# Patient Record
Sex: FEMALE | Race: WHITE | NOT HISPANIC OR LATINO | Employment: OTHER | URBAN - METROPOLITAN AREA
[De-identification: names, ages, dates, MRNs, and addresses within clinical notes are randomized per-mention and may not be internally consistent; named-entity substitution may affect disease eponyms.]

---

## 2019-10-31 DIAGNOSIS — R22.1 LOCALIZED SWELLING, MASS AND LUMP, NECK: Primary | ICD-10-CM

## 2019-11-07 ENCOUNTER — HOSPITAL ENCOUNTER (OUTPATIENT)
Dept: RADIOLOGY | Facility: HOSPITAL | Age: 84
Discharge: HOME | End: 2019-11-07
Attending: OTOLARYNGOLOGY
Payer: MEDICARE

## 2019-11-07 DIAGNOSIS — I25.10 CORONARY ATHEROSCLEROSIS OF NATIVE CORONARY ARTERY: Primary | ICD-10-CM

## 2019-11-07 DIAGNOSIS — E03.9 MYXEDEMA HEART DISEASE: ICD-10-CM

## 2019-11-07 DIAGNOSIS — R22.1 LOCALIZED SWELLING, MASS AND LUMP, NECK: ICD-10-CM

## 2019-11-07 DIAGNOSIS — E78.5 HYPERLIPEMIA: ICD-10-CM

## 2019-11-07 DIAGNOSIS — I51.9 MYXEDEMA HEART DISEASE: ICD-10-CM

## 2019-11-07 DIAGNOSIS — I00 RHEUMATIC FEVER WITHOUT HEART INVOLVEMENT: ICD-10-CM

## 2019-11-07 LAB
CREAT SERPL-MCNC: 0.7 MG/DL (ref 0.5–1.4)
SAMPLE: NORMAL

## 2019-11-07 PROCEDURE — 70492 CT SFT TSUE NCK W/O & W/DYE: CPT | Mod: TC,PO

## 2019-11-07 PROCEDURE — 25500020 PHARM REV CODE 255: Mod: PO | Performed by: OTOLARYNGOLOGY

## 2019-11-07 RX ADMIN — IOHEXOL 100 ML: 350 INJECTION, SOLUTION INTRAVENOUS at 10:11

## 2020-01-10 ENCOUNTER — TELEPHONE (OUTPATIENT)
Dept: INFUSION THERAPY | Facility: HOSPITAL | Age: 85
End: 2020-01-10

## 2020-01-12 PROBLEM — R54 SENILE DEBILITY: Status: ACTIVE | Noted: 2019-03-26

## 2020-01-12 PROBLEM — I25.118 ATHEROSCLEROTIC HEART DISEASE OF NATIVE CORONARY ARTERY WITH OTHER FORMS OF ANGINA PECTORIS: Status: ACTIVE | Noted: 2017-01-06

## 2020-01-12 PROBLEM — G62.9 NEUROPATHY: Status: ACTIVE | Noted: 2018-07-23

## 2020-01-12 PROBLEM — J30.1 SEASONAL ALLERGIC RHINITIS DUE TO POLLEN: Status: ACTIVE | Noted: 2018-04-03

## 2020-01-12 PROBLEM — F41.9 ANXIETY DISORDER, UNSPECIFIED: Status: ACTIVE | Noted: 2017-01-06

## 2020-01-12 PROBLEM — M81.0 OSTEOPOROSIS: Status: ACTIVE | Noted: 2017-01-06

## 2020-01-12 PROBLEM — R26.9 GAIT DISTURBANCE: Status: ACTIVE | Noted: 2018-07-23

## 2020-01-13 ENCOUNTER — OFFICE VISIT (OUTPATIENT)
Dept: FAMILY MEDICINE | Facility: CLINIC | Age: 85
End: 2020-01-13
Payer: MEDICARE

## 2020-01-13 VITALS
WEIGHT: 154 LBS | SYSTOLIC BLOOD PRESSURE: 122 MMHG | BODY MASS INDEX: 27.29 KG/M2 | HEART RATE: 60 BPM | HEIGHT: 63 IN | DIASTOLIC BLOOD PRESSURE: 64 MMHG

## 2020-01-13 DIAGNOSIS — K21.9 GASTROESOPHAGEAL REFLUX DISEASE WITHOUT ESOPHAGITIS: ICD-10-CM

## 2020-01-13 DIAGNOSIS — E78.5 HYPERLIPIDEMIA, UNSPECIFIED HYPERLIPIDEMIA TYPE: ICD-10-CM

## 2020-01-13 DIAGNOSIS — M81.0 AGE-RELATED OSTEOPOROSIS WITHOUT CURRENT PATHOLOGICAL FRACTURE: ICD-10-CM

## 2020-01-13 DIAGNOSIS — G31.84 MILD COGNITIVE IMPAIRMENT: ICD-10-CM

## 2020-01-13 DIAGNOSIS — R54 SENILE DEBILITY: ICD-10-CM

## 2020-01-13 DIAGNOSIS — E03.9 HYPOTHYROIDISM, UNSPECIFIED TYPE: ICD-10-CM

## 2020-01-13 DIAGNOSIS — I10 ESSENTIAL HYPERTENSION: Primary | ICD-10-CM

## 2020-01-13 DIAGNOSIS — F41.9 ANXIETY DISORDER, UNSPECIFIED TYPE: ICD-10-CM

## 2020-01-13 DIAGNOSIS — N95.1 MENOPAUSAL STATE: ICD-10-CM

## 2020-01-13 DIAGNOSIS — I25.118 ATHEROSCLEROTIC HEART DISEASE OF NATIVE CORONARY ARTERY WITH OTHER FORMS OF ANGINA PECTORIS: ICD-10-CM

## 2020-01-13 PROCEDURE — 99214 OFFICE O/P EST MOD 30 MIN: CPT | Mod: S$GLB,,, | Performed by: FAMILY MEDICINE

## 2020-01-13 PROCEDURE — 1159F PR MEDICATION LIST DOCUMENTED IN MEDICAL RECORD: ICD-10-PCS | Mod: S$GLB,,, | Performed by: FAMILY MEDICINE

## 2020-01-13 PROCEDURE — 99214 PR OFFICE/OUTPT VISIT, EST, LEVL IV, 30-39 MIN: ICD-10-PCS | Mod: S$GLB,,, | Performed by: FAMILY MEDICINE

## 2020-01-13 PROCEDURE — 1159F MED LIST DOCD IN RCRD: CPT | Mod: S$GLB,,, | Performed by: FAMILY MEDICINE

## 2020-01-13 RX ORDER — FAMOTIDINE 20 MG/1
20 TABLET, FILM COATED ORAL NIGHTLY PRN
Qty: 90 TABLET | Refills: 1 | Status: SHIPPED | OUTPATIENT
Start: 2020-01-13 | End: 2021-01-05 | Stop reason: SDUPTHER

## 2020-01-13 RX ORDER — PANTOPRAZOLE SODIUM 40 MG/1
40 TABLET, DELAYED RELEASE ORAL DAILY
Qty: 90 TABLET | Refills: 1 | Status: SHIPPED | OUTPATIENT
Start: 2020-01-13 | End: 2020-03-16 | Stop reason: SDUPTHER

## 2020-01-13 RX ORDER — CLOPIDOGREL BISULFATE 75 MG/1
75 TABLET ORAL DAILY
COMMUNITY
End: 2020-01-13 | Stop reason: SDUPTHER

## 2020-01-13 RX ORDER — PANTOPRAZOLE SODIUM 40 MG/1
40 TABLET, DELAYED RELEASE ORAL DAILY
COMMUNITY
End: 2020-01-13 | Stop reason: SDUPTHER

## 2020-01-13 RX ORDER — FLUOXETINE 10 MG/1
10 CAPSULE ORAL DAILY
Qty: 90 CAPSULE | Refills: 1 | Status: SHIPPED | OUTPATIENT
Start: 2020-01-13 | End: 2020-03-16 | Stop reason: SDUPTHER

## 2020-01-13 RX ORDER — AMITRIPTYLINE HYDROCHLORIDE 10 MG/1
20 TABLET, FILM COATED ORAL NIGHTLY PRN
Qty: 180 TABLET | Refills: 1 | Status: SHIPPED | OUTPATIENT
Start: 2020-01-13 | End: 2020-03-16 | Stop reason: SDUPTHER

## 2020-01-13 RX ORDER — CARVEDILOL 3.12 MG/1
3.12 TABLET ORAL 2 TIMES DAILY
Qty: 180 TABLET | Refills: 1 | Status: SHIPPED | OUTPATIENT
Start: 2020-01-13 | End: 2020-03-16 | Stop reason: SDUPTHER

## 2020-01-13 RX ORDER — FLUOXETINE 10 MG/1
10 CAPSULE ORAL DAILY
COMMUNITY
End: 2020-01-13 | Stop reason: SDUPTHER

## 2020-01-13 RX ORDER — ATORVASTATIN CALCIUM 40 MG/1
40 TABLET, FILM COATED ORAL DAILY
COMMUNITY
End: 2020-01-13 | Stop reason: SDUPTHER

## 2020-01-13 RX ORDER — CLOPIDOGREL BISULFATE 75 MG/1
75 TABLET ORAL DAILY
Qty: 90 TABLET | Refills: 1 | Status: SHIPPED | OUTPATIENT
Start: 2020-01-13 | End: 2020-03-16

## 2020-01-13 RX ORDER — LEVOTHYROXINE SODIUM 75 UG/1
75 TABLET ORAL
Qty: 90 TABLET | Refills: 1 | Status: SHIPPED | OUTPATIENT
Start: 2020-01-13 | End: 2020-03-16 | Stop reason: SDUPTHER

## 2020-01-13 RX ORDER — CARVEDILOL 3.12 MG/1
3.12 TABLET ORAL 2 TIMES DAILY
COMMUNITY
Start: 2019-11-27 | End: 2020-01-13 | Stop reason: SDUPTHER

## 2020-01-13 RX ORDER — AMITRIPTYLINE HYDROCHLORIDE 10 MG/1
20 TABLET, FILM COATED ORAL NIGHTLY PRN
COMMUNITY
End: 2020-01-13 | Stop reason: SDUPTHER

## 2020-01-13 RX ORDER — LEVOTHYROXINE SODIUM 75 UG/1
75 TABLET ORAL
COMMUNITY
Start: 2019-11-15 | End: 2020-01-13 | Stop reason: SDUPTHER

## 2020-01-13 RX ORDER — ASPIRIN 81 MG/1
81 TABLET ORAL DAILY
COMMUNITY
End: 2020-03-16

## 2020-01-13 RX ORDER — ATORVASTATIN CALCIUM 40 MG/1
40 TABLET, FILM COATED ORAL DAILY
Qty: 90 TABLET | Refills: 1 | Status: SHIPPED | OUTPATIENT
Start: 2020-01-13 | End: 2020-03-16 | Stop reason: SDUPTHER

## 2020-01-13 NOTE — PROGRESS NOTES
SUBJECTIVE:    Patient ID: Mere Hoffmann is a 86 y.o. female.    Chief Complaint: Follow-up (bottles brought -ac )    This 86-year-old female lives with her daughter and her family.  Recently she has had some basal cell carcinoma as on the face and had Mohs surgery.  Dr. Candice Kaiser  Dermatology sees her q.6 months.    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  She states she has been getting more and more forgetful and needs reminders and cues to do her daily chores.  She needs help with taking showers and needs supervision for her showers.  She needs assistance with her grocery with her dressing and with putting on her shoes.  She is not able to cook a meal.  And her balance requires her to use a Rollator to prevent falls.      Hospital Outpatient Visit on 11/07/2019   Component Date Value Ref Range Status    POC Creatinine 11/07/2019 0.7  0.5 - 1.4 mg/dL Final    Sample 11/07/2019 CHRIS   Final   Lab Visit on 11/07/2019   Component Date Value Ref Range Status    Sodium 11/07/2019 142  136 - 145 mmol/L Final    Potassium 11/07/2019 4.0  3.5 - 5.1 mmol/L Final    Chloride 11/07/2019 105  95 - 110 mmol/L Final    CO2 11/07/2019 27  23 - 29 mmol/L Final    Glucose 11/07/2019 96  70 - 110 mg/dL Final    BUN, Bld 11/07/2019 13  8 - 23 mg/dL Final    Creatinine 11/07/2019 0.8  0.5 - 1.4 mg/dL Final    Calcium 11/07/2019 8.3* 8.7 - 10.5 mg/dL Final    Total Protein 11/07/2019 7.0  6.0 - 8.4 g/dL Final    Albumin 11/07/2019 3.9  3.5 - 5.2 g/dL Final    Total Bilirubin 11/07/2019 0.9  0.1 - 1.0 mg/dL Final    Alkaline Phosphatase 11/07/2019 106  55 - 135 U/L Final    AST 11/07/2019 18  10 - 40 U/L Final    ALT 11/07/2019 22  10 - 44 U/L Final    Anion Gap 11/07/2019 10  8 - 16 mmol/L Final    eGFR if African American 11/07/2019 >60.0  >60 mL/min/1.73 m^2 Final    eGFR if non African American  11/07/2019 >60.0  >60 mL/min/1.73 m^2 Final    Cholesterol 11/07/2019 154  120 - 199 mg/dL Final    Triglycerides 11/07/2019 129  30 - 150 mg/dL Final    HDL 11/07/2019 43  40 - 75 mg/dL Final    LDL Cholesterol 11/07/2019 85.2  63.0 - 159.0 mg/dL Final    Hdl/Cholesterol Ratio 11/07/2019 27.9  20.0 - 50.0 % Final    Total Cholesterol/HDL Ratio 11/07/2019 3.6  2.0 - 5.0 Final    Non-HDL Cholesterol 11/07/2019 111  mg/dL Final    WBC 11/07/2019 6.31  3.90 - 12.70 K/uL Final    RBC 11/07/2019 4.41  4.00 - 5.40 M/uL Final    Hemoglobin 11/07/2019 12.9  12.0 - 16.0 g/dL Final    Hematocrit 11/07/2019 39.4  37.0 - 48.5 % Final    Mean Corpuscular Volume 11/07/2019 89  82 - 98 fL Final    Mean Corpuscular Hemoglobin 11/07/2019 29.3  27.0 - 31.0 pg Final    Mean Corpuscular Hemoglobin Conc 11/07/2019 32.7  32.0 - 36.0 g/dL Final    RDW 11/07/2019 13.3  11.5 - 14.5 % Final    Platelets 11/07/2019 232  150 - 350 K/uL Final    MPV 11/07/2019 9.7  9.2 - 12.9 fL Final    Immature Granulocytes 11/07/2019 0.5  0.0 - 0.5 % Final    Gran # (ANC) 11/07/2019 4.0  1.8 - 7.7 K/uL Final    Immature Grans (Abs) 11/07/2019 0.03  0.00 - 0.04 K/uL Final    Lymph # 11/07/2019 1.6  1.0 - 4.8 K/uL Final    Mono # 11/07/2019 0.4  0.3 - 1.0 K/uL Final    Eos # 11/07/2019 0.3  0.0 - 0.5 K/uL Final    Baso # 11/07/2019 0.07  0.00 - 0.20 K/uL Final    nRBC 11/07/2019 0  0 /100 WBC Final    Gran% 11/07/2019 63.0  38.0 - 73.0 % Final    Lymph% 11/07/2019 24.6  18.0 - 48.0 % Final    Mono% 11/07/2019 6.7  4.0 - 15.0 % Final    Eosinophil% 11/07/2019 4.1  0.0 - 8.0 % Final    Basophil% 11/07/2019 1.1  0.0 - 1.9 % Final    Differential Method 11/07/2019 Automated   Final    BNP 11/07/2019 44  0 - 99 pg/mL Final       History reviewed. No pertinent past medical history.  Past Surgical History:   Procedure Laterality Date    CHOLECYSTECTOMY      moh's surgery  2019    basal cell ca     History reviewed. No pertinent  family history.    Marital Status:   Alcohol History:  has no alcohol history on file.  Tobacco History:  reports that she has quit smoking. She has never used smokeless tobacco.  Drug History:  has no drug history on file.    Review of patient's allergies indicates:   Allergen Reactions    Penicillins     Sulfa (sulfonamide antibiotics)        Current Outpatient Medications:     amitriptyline (ELAVIL) 10 MG tablet, Take 2 tablets (20 mg total) by mouth nightly as needed for Insomnia., Disp: 180 tablet, Rfl: 1    aspirin (ECOTRIN) 81 MG EC tablet, Take 81 mg by mouth once daily., Disp: , Rfl:     atorvastatin (LIPITOR) 40 MG tablet, Take 1 tablet (40 mg total) by mouth once daily., Disp: 90 tablet, Rfl: 1    carvedilol (COREG) 3.125 MG tablet, Take 1 tablet (3.125 mg total) by mouth 2 (two) times daily., Disp: 180 tablet, Rfl: 1    clopidogrel (PLAVIX) 75 mg tablet, Take 1 tablet (75 mg total) by mouth once daily., Disp: 90 tablet, Rfl: 1    FLUoxetine 10 MG capsule, Take 1 capsule (10 mg total) by mouth once daily., Disp: 90 capsule, Rfl: 1    pantoprazole (PROTONIX) 40 MG tablet, Take 1 tablet (40 mg total) by mouth once daily., Disp: 90 tablet, Rfl: 1    UNITHROID 75 mcg tablet, Take 1 tablet (75 mcg total) by mouth before breakfast., Disp: 90 tablet, Rfl: 1    famotidine (PEPCID) 20 MG tablet, Take 1 tablet (20 mg total) by mouth nightly as needed for Heartburn., Disp: 90 tablet, Rfl: 1    Review of Systems   Constitutional: Negative for appetite change (eats  2  meals  a  day , plus  snacks , loves  chocolate), chills, fatigue, fever and unexpected weight change.   HENT: Negative for congestion, ear pain, sinus pain, sore throat and trouble swallowing.    Eyes: Negative for pain, discharge and visual disturbance.   Respiratory: Negative for apnea, cough, shortness of breath (with exertion) and wheezing.    Cardiovascular: Negative for chest pain, palpitations and leg swelling.  "  Gastrointestinal: Positive for constipation (Miralax helps). Negative for abdominal pain, blood in stool, diarrhea, nausea and vomiting.        On protonix and  Zantac    Endocrine: Negative for heat intolerance, polydipsia and polyuria.   Genitourinary: Negative for difficulty urinating, dyspareunia, dysuria, frequency, hematuria and menstrual problem.        No nocturia, urge incontinence   Musculoskeletal: Positive for back pain (mid back pains .. occas  Tylebnol). Negative for arthralgias, gait problem, joint swelling and myalgias.   Skin: Positive for rash (basal cell ca).   Allergic/Immunologic: Negative for environmental allergies, food allergies and immunocompromised state.   Neurological: Negative for dizziness, tremors, seizures, numbness and headaches.   Psychiatric/Behavioral: Negative for behavioral problems, confusion, hallucinations, sleep disturbance (sleeps  well w/ amitriptylline) and suicidal ideas. The patient is not nervous/anxious.           Objective:      Vitals:    01/13/20 1140   BP: 122/64   Pulse: 60   Weight: 69.9 kg (154 lb)   Height: 5' 2.5" (1.588 m)     Body mass index is 27.72 kg/m².  Physical Exam   Constitutional: She is oriented to person, place, and time. She appears well-developed and well-nourished.   Elderly female in no apparent distress.   HENT:   Head: Normocephalic and atraumatic.   Right Ear: External ear normal.   Left Ear: External ear normal.   Nose: Nose normal.   Mouth/Throat: Oropharynx is clear and moist.   Eyes: Pupils are equal, round, and reactive to light. EOM are normal.   Neck: Normal range of motion. Neck supple. Carotid bruit is not present. No thyromegaly present.   Cardiovascular: Normal rate, regular rhythm, normal heart sounds and intact distal pulses.   No murmur heard.  Pulmonary/Chest: Effort normal and breath sounds normal. She has no wheezes. She has no rales.   Abdominal: Soft. Bowel sounds are normal. She exhibits no distension. There is no " hepatosplenomegaly. There is no tenderness.   Musculoskeletal: Normal range of motion. She exhibits no tenderness or deformity.        Lumbar back: Normal. She exhibits no pain and no spasm.   Bends 90 degrees at  waist shoulders have full range of motion knees are crepitant but have good range of motion.  She walks with a slow off balance cautious  gait   Lymphadenopathy:     She has no cervical adenopathy.   Neurological: She is alert and oriented to person, place, and time. No cranial nerve deficit. Coordination normal.   She remembers 3 of the last 4 presidents.   Skin: Skin is warm and dry. No rash noted.   Psychiatric: She has a normal mood and affect. Her behavior is normal. Judgment and thought content normal.   Nursing note and vitals reviewed.        Assessment:       1. Essential hypertension    2. Anxiety disorder, unspecified type    3. 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 Hyperlipidemia, unspecified hyperlipidemia type    5. Hypothyroidism, unspecified type    6. Gastroesophageal reflux disease without esophagitis    7. Mild cognitive impairment    8. Menopausal state    9. Age-related osteoporosis without current pathological fracture    10. Senile debility         Plan:       Essential hypertension  Blood pressure well controlled on current medications.  Anxiety disorder, unspecified type  -     FLUoxetine 10 MG capsule; Take 1 capsule (10 mg total) by mouth once daily.  Dispense: 90 capsule; Refill: 1  -     amitriptyline (ELAVIL) 10 MG tablet; Take 2 tablets (20 mg total) by mouth nightly as needed for Insomnia.  Dispense: 180 tablet; Refill: 1  Okay to refill Prozac and Elavil  Atherosclerotic heart disease of native coronary artery with other forms of angina pectoris  -     clopidogrel (PLAVIX) 75 mg tablet; Take 1 tablet (75 mg total) by mouth once daily.  Dispense: 90 tablet; Refill: 1  -     carvedilol (COREG) 3.125 MG tablet; Take 1  tablet (3.125 mg total) by mouth 2 (two) times daily.  Dispense: 180 tablet; Refill: 1  Continue current medications, she has no current angina  Hyperlipidemia, unspecified hyperlipidemia type  -     atorvastatin (LIPITOR) 40 MG tablet; Take 1 tablet (40 mg total) by mouth once daily.  Dispense: 90 tablet; Refill: 1    Hypothyroidism, unspecified type  -     UNITHROID 75 mcg tablet; Take 1 tablet (75 mcg total) by mouth before breakfast.  Dispense: 90 tablet; Refill: 1  Continue unithroid,.  Check TSH in 6 months  Gastroesophageal reflux disease without esophagitis  -     pantoprazole (PROTONIX) 40 MG tablet; Take 1 tablet (40 mg total) by mouth once daily.  Dispense: 90 tablet; Refill: 1  -     famotidine (PEPCID) 20 MG tablet; Take 1 tablet (20 mg total) by mouth nightly as needed for Heartburn.  Dispense: 90 tablet; Refill: 1  Continue anti-reflux medications  Mild cognitive impairment  Normal aging process with mild forgetfulness present.  Menopausal state    Age-related osteoporosis without current pathological fracture    Senile debility      No follow-ups on file.

## 2020-01-29 ENCOUNTER — LAB VISIT (OUTPATIENT)
Dept: LAB | Facility: HOSPITAL | Age: 85
End: 2020-01-29
Attending: INTERNAL MEDICINE
Payer: MEDICARE

## 2020-01-29 DIAGNOSIS — Z79.899 ENCOUNTER FOR LONG-TERM (CURRENT) USE OF OTHER MEDICATIONS: ICD-10-CM

## 2020-01-29 DIAGNOSIS — M81.0 SENILE OSTEOPOROSIS: Primary | ICD-10-CM

## 2020-01-29 LAB — CALCIUM SERPL-MCNC: 9.1 MG/DL (ref 8.7–10.5)

## 2020-01-29 PROCEDURE — 82310 ASSAY OF CALCIUM: CPT

## 2020-01-29 PROCEDURE — 36415 COLL VENOUS BLD VENIPUNCTURE: CPT

## 2020-02-05 ENCOUNTER — INFUSION (OUTPATIENT)
Dept: INFUSION THERAPY | Facility: HOSPITAL | Age: 85
End: 2020-02-05
Attending: INTERNAL MEDICINE
Payer: MEDICARE

## 2020-02-05 VITALS
TEMPERATURE: 97 F | BODY MASS INDEX: 28.11 KG/M2 | SYSTOLIC BLOOD PRESSURE: 135 MMHG | DIASTOLIC BLOOD PRESSURE: 64 MMHG | RESPIRATION RATE: 18 BRPM | WEIGHT: 156.19 LBS | HEART RATE: 81 BPM

## 2020-02-05 DIAGNOSIS — M81.0 AGE-RELATED OSTEOPOROSIS WITHOUT CURRENT PATHOLOGICAL FRACTURE: Primary | ICD-10-CM

## 2020-02-05 PROCEDURE — 96372 THER/PROPH/DIAG INJ SC/IM: CPT

## 2020-02-05 PROCEDURE — 63600175 PHARM REV CODE 636 W HCPCS: Mod: JG | Performed by: INTERNAL MEDICINE

## 2020-02-05 RX ADMIN — DENOSUMAB 60 MG: 60 INJECTION SUBCUTANEOUS at 03:02

## 2020-02-11 ENCOUNTER — LAB VISIT (OUTPATIENT)
Dept: LAB | Facility: HOSPITAL | Age: 85
End: 2020-02-11
Attending: INTERNAL MEDICINE
Payer: MEDICARE

## 2020-02-11 DIAGNOSIS — I25.10 CORONARY ATHEROSCLEROSIS OF NATIVE CORONARY ARTERY: Primary | ICD-10-CM

## 2020-02-11 DIAGNOSIS — Z79.899 ENCOUNTER FOR LONG-TERM (CURRENT) USE OF OTHER MEDICATIONS: ICD-10-CM

## 2020-02-11 DIAGNOSIS — I10 ESSENTIAL HYPERTENSION, MALIGNANT: ICD-10-CM

## 2020-02-11 DIAGNOSIS — E78.41 ELEVATED LIPOPROTEIN A LEVEL: ICD-10-CM

## 2020-02-11 LAB
ALBUMIN SERPL BCP-MCNC: 3.7 G/DL (ref 3.5–5.2)
ALP SERPL-CCNC: 101 U/L (ref 55–135)
ALT SERPL W/O P-5'-P-CCNC: 27 U/L (ref 10–44)
ANION GAP SERPL CALC-SCNC: 9 MMOL/L (ref 8–16)
AST SERPL-CCNC: 22 U/L (ref 10–40)
BACTERIA #/AREA URNS HPF: NEGATIVE /HPF
BASOPHILS # BLD AUTO: 0.06 K/UL (ref 0–0.2)
BASOPHILS NFR BLD: 1 % (ref 0–1.9)
BILIRUB SERPL-MCNC: 0.8 MG/DL (ref 0.1–1)
BILIRUB UR QL STRIP: NEGATIVE
BUN SERPL-MCNC: 14 MG/DL (ref 8–23)
CALCIUM SERPL-MCNC: 8 MG/DL (ref 8.7–10.5)
CHLORIDE SERPL-SCNC: 105 MMOL/L (ref 95–110)
CLARITY UR: CLEAR
CO2 SERPL-SCNC: 25 MMOL/L (ref 23–29)
COLOR UR: YELLOW
CREAT SERPL-MCNC: 0.8 MG/DL (ref 0.5–1.4)
DIFFERENTIAL METHOD: NORMAL
EOSINOPHIL # BLD AUTO: 0.2 K/UL (ref 0–0.5)
EOSINOPHIL NFR BLD: 3.7 % (ref 0–8)
ERYTHROCYTE [DISTWIDTH] IN BLOOD BY AUTOMATED COUNT: 13.2 % (ref 11.5–14.5)
EST. GFR  (AFRICAN AMERICAN): >60 ML/MIN/1.73 M^2
EST. GFR  (NON AFRICAN AMERICAN): >60 ML/MIN/1.73 M^2
GLUCOSE SERPL-MCNC: 112 MG/DL (ref 70–110)
GLUCOSE UR QL STRIP: NEGATIVE
HCT VFR BLD AUTO: 40.7 % (ref 37–48.5)
HGB BLD-MCNC: 13.2 G/DL (ref 12–16)
HGB UR QL STRIP: NEGATIVE
HYALINE CASTS #/AREA URNS LPF: 27 /LPF
IMM GRANULOCYTES # BLD AUTO: 0.02 K/UL (ref 0–0.04)
IMM GRANULOCYTES NFR BLD AUTO: 0.3 % (ref 0–0.5)
KETONES UR QL STRIP: NEGATIVE
LEUKOCYTE ESTERASE UR QL STRIP: ABNORMAL
LYMPHOCYTES # BLD AUTO: 1.6 K/UL (ref 1–4.8)
LYMPHOCYTES NFR BLD: 28.7 % (ref 18–48)
MAGNESIUM SERPL-MCNC: 2.4 MG/DL (ref 1.6–2.6)
MCH RBC QN AUTO: 28.8 PG (ref 27–31)
MCHC RBC AUTO-ENTMCNC: 32.4 G/DL (ref 32–36)
MCV RBC AUTO: 89 FL (ref 82–98)
MICROSCOPIC COMMENT: ABNORMAL
MONOCYTES # BLD AUTO: 0.4 K/UL (ref 0.3–1)
MONOCYTES NFR BLD: 7.5 % (ref 4–15)
NEUTROPHILS # BLD AUTO: 3.4 K/UL (ref 1.8–7.7)
NEUTROPHILS NFR BLD: 58.8 % (ref 38–73)
NITRITE UR QL STRIP: NEGATIVE
NRBC BLD-RTO: 0 /100 WBC
PH UR STRIP: 7 [PH] (ref 5–8)
PLATELET # BLD AUTO: 240 K/UL (ref 150–350)
PMV BLD AUTO: 9.6 FL (ref 9.2–12.9)
POTASSIUM SERPL-SCNC: 4.1 MMOL/L (ref 3.5–5.1)
PROT SERPL-MCNC: 6.8 G/DL (ref 6–8.4)
PROT UR QL STRIP: NEGATIVE
RBC # BLD AUTO: 4.59 M/UL (ref 4–5.4)
RBC #/AREA URNS HPF: 1 /HPF (ref 0–4)
SODIUM SERPL-SCNC: 139 MMOL/L (ref 136–145)
SP GR UR STRIP: 1.02 (ref 1–1.03)
SQUAMOUS #/AREA URNS HPF: 6 /HPF
URN SPEC COLLECT METH UR: ABNORMAL
UROBILINOGEN UR STRIP-ACNC: NEGATIVE EU/DL
WBC # BLD AUTO: 5.72 K/UL (ref 3.9–12.7)
WBC #/AREA URNS HPF: 4 /HPF (ref 0–5)
WBC CLUMPS URNS QL MICRO: ABNORMAL

## 2020-02-11 PROCEDURE — 80053 COMPREHEN METABOLIC PANEL: CPT

## 2020-02-11 PROCEDURE — 36415 COLL VENOUS BLD VENIPUNCTURE: CPT

## 2020-02-11 PROCEDURE — 83735 ASSAY OF MAGNESIUM: CPT

## 2020-02-11 PROCEDURE — 81001 URINALYSIS AUTO W/SCOPE: CPT

## 2020-02-11 PROCEDURE — 85025 COMPLETE CBC W/AUTO DIFF WBC: CPT

## 2020-02-21 ENCOUNTER — HOSPITAL ENCOUNTER (OUTPATIENT)
Dept: RADIOLOGY | Facility: HOSPITAL | Age: 85
Discharge: HOME | End: 2020-02-21
Attending: PSYCHIATRY & NEUROLOGY
Payer: MEDICARE

## 2020-02-21 DIAGNOSIS — R41.0 DISORIENTATED: ICD-10-CM

## 2020-02-21 DIAGNOSIS — R41.0 DISORIENTATED: Primary | ICD-10-CM

## 2020-02-21 PROCEDURE — 71046 X-RAY EXAM CHEST 2 VIEWS: CPT | Mod: TC,PO

## 2020-02-26 DIAGNOSIS — I63.9 CEREBRAL INFARCTION: Primary | ICD-10-CM

## 2020-02-28 DIAGNOSIS — I63.9 CEREBRAL INFARCTION, UNSPECIFIED: Primary | ICD-10-CM

## 2020-03-05 ENCOUNTER — TELEPHONE (OUTPATIENT)
Dept: FAMILY MEDICINE | Facility: CLINIC | Age: 85
End: 2020-03-05

## 2020-03-05 NOTE — TELEPHONE ENCOUNTER
----- Message from Katie Diaz sent at 3/5/2020 11:46 AM CST -----  Patients daughter tyrone wants to know when the soonest her mother could see dr bertrand please give her a call [phone] patient was diagnosed as having a stroke

## 2020-03-06 DIAGNOSIS — I65.29 OCCLUSION AND STENOSIS OF UNSPECIFIED CAROTID ARTERY: Primary | ICD-10-CM

## 2020-03-10 ENCOUNTER — HOSPITAL ENCOUNTER (OUTPATIENT)
Dept: RADIOLOGY | Facility: HOSPITAL | Age: 85
Discharge: HOME | End: 2020-03-10
Attending: INTERNAL MEDICINE
Payer: MEDICARE

## 2020-03-10 DIAGNOSIS — M25.559 PAIN IN JOINT, PELVIC REGION AND THIGH: ICD-10-CM

## 2020-03-10 DIAGNOSIS — M25.559 PAIN IN JOINT, PELVIC REGION AND THIGH: Primary | ICD-10-CM

## 2020-03-10 DIAGNOSIS — E78.5 HYPERLIPEMIA: Primary | ICD-10-CM

## 2020-03-10 PROCEDURE — 72110 X-RAY EXAM L-2 SPINE 4/>VWS: CPT | Mod: TC,PO

## 2020-03-10 PROCEDURE — 73502 X-RAY EXAM HIP UNI 2-3 VIEWS: CPT | Mod: TC,PO,LT

## 2020-03-16 ENCOUNTER — TELEPHONE (OUTPATIENT)
Dept: FAMILY MEDICINE | Facility: CLINIC | Age: 85
End: 2020-03-16

## 2020-03-16 ENCOUNTER — OFFICE VISIT (OUTPATIENT)
Dept: FAMILY MEDICINE | Facility: CLINIC | Age: 85
End: 2020-03-16
Payer: MEDICARE

## 2020-03-16 VITALS
HEART RATE: 64 BPM | DIASTOLIC BLOOD PRESSURE: 84 MMHG | HEIGHT: 63 IN | BODY MASS INDEX: 26.05 KG/M2 | WEIGHT: 147 LBS | SYSTOLIC BLOOD PRESSURE: 136 MMHG

## 2020-03-16 DIAGNOSIS — N95.1 MENOPAUSAL STATE: ICD-10-CM

## 2020-03-16 DIAGNOSIS — I63.81 STROKE OF RIGHT BASAL GANGLIA: Primary | ICD-10-CM

## 2020-03-16 DIAGNOSIS — F41.9 ANXIETY DISORDER, UNSPECIFIED TYPE: ICD-10-CM

## 2020-03-16 DIAGNOSIS — R11.0 NAUSEA: ICD-10-CM

## 2020-03-16 DIAGNOSIS — G62.9 NEUROPATHY: ICD-10-CM

## 2020-03-16 DIAGNOSIS — G31.84 MILD COGNITIVE IMPAIRMENT: ICD-10-CM

## 2020-03-16 DIAGNOSIS — M81.0 AGE-RELATED OSTEOPOROSIS WITHOUT CURRENT PATHOLOGICAL FRACTURE: ICD-10-CM

## 2020-03-16 DIAGNOSIS — I25.118 ATHEROSCLEROTIC HEART DISEASE OF NATIVE CORONARY ARTERY WITH OTHER FORMS OF ANGINA PECTORIS: ICD-10-CM

## 2020-03-16 DIAGNOSIS — I10 ESSENTIAL HYPERTENSION: ICD-10-CM

## 2020-03-16 DIAGNOSIS — E78.5 HYPERLIPIDEMIA, UNSPECIFIED HYPERLIPIDEMIA TYPE: ICD-10-CM

## 2020-03-16 DIAGNOSIS — E03.9 HYPOTHYROIDISM, UNSPECIFIED TYPE: ICD-10-CM

## 2020-03-16 DIAGNOSIS — K21.9 GASTROESOPHAGEAL REFLUX DISEASE WITHOUT ESOPHAGITIS: ICD-10-CM

## 2020-03-16 PROCEDURE — 99214 PR OFFICE/OUTPT VISIT, EST, LEVL IV, 30-39 MIN: ICD-10-PCS | Mod: S$GLB,,, | Performed by: FAMILY MEDICINE

## 2020-03-16 PROCEDURE — 99214 OFFICE O/P EST MOD 30 MIN: CPT | Mod: S$GLB,,, | Performed by: FAMILY MEDICINE

## 2020-03-16 RX ORDER — LEVOTHYROXINE SODIUM 75 UG/1
75 TABLET ORAL
Qty: 90 TABLET | Refills: 1 | Status: SHIPPED | OUTPATIENT
Start: 2020-03-16 | End: 2021-01-18 | Stop reason: SDUPTHER

## 2020-03-16 RX ORDER — PANTOPRAZOLE SODIUM 40 MG/1
40 TABLET, DELAYED RELEASE ORAL DAILY
Qty: 90 TABLET | Refills: 1 | Status: SHIPPED | OUTPATIENT
Start: 2020-03-16 | End: 2021-01-18 | Stop reason: SDUPTHER

## 2020-03-16 RX ORDER — AMITRIPTYLINE HYDROCHLORIDE 10 MG/1
20 TABLET, FILM COATED ORAL NIGHTLY PRN
Qty: 180 TABLET | Refills: 1 | Status: SHIPPED | OUTPATIENT
Start: 2020-03-16 | End: 2020-09-28 | Stop reason: SDUPTHER

## 2020-03-16 RX ORDER — GABAPENTIN 100 MG/1
CAPSULE ORAL
Qty: 60 CAPSULE | Refills: 2 | Status: SHIPPED | OUTPATIENT
Start: 2020-03-16 | End: 2021-01-18

## 2020-03-16 RX ORDER — FLUOXETINE 10 MG/1
10 CAPSULE ORAL DAILY
Qty: 90 CAPSULE | Refills: 1 | Status: SHIPPED | OUTPATIENT
Start: 2020-03-16 | End: 2021-01-18 | Stop reason: SDUPTHER

## 2020-03-16 RX ORDER — ATORVASTATIN CALCIUM 40 MG/1
40 TABLET, FILM COATED ORAL DAILY
Qty: 90 TABLET | Refills: 1 | Status: SHIPPED | OUTPATIENT
Start: 2020-03-16 | End: 2021-01-18 | Stop reason: SDUPTHER

## 2020-03-16 RX ORDER — ONDANSETRON 4 MG/1
8 TABLET, FILM COATED ORAL EVERY 12 HOURS PRN
Qty: 60 TABLET | Refills: 0 | Status: SHIPPED | OUTPATIENT
Start: 2020-03-16 | End: 2020-04-07 | Stop reason: SDUPTHER

## 2020-03-16 RX ORDER — CARVEDILOL 3.12 MG/1
3.12 TABLET ORAL 2 TIMES DAILY
Qty: 180 TABLET | Refills: 1 | Status: SHIPPED | OUTPATIENT
Start: 2020-03-16 | End: 2021-01-18 | Stop reason: SDUPTHER

## 2020-03-16 RX ORDER — ASPIRIN AND DIPYRIDAMOLE 25; 200 MG/1; MG/1
CAPSULE, EXTENDED RELEASE ORAL
COMMUNITY
Start: 2020-03-10 | End: 2020-09-28 | Stop reason: SDUPTHER

## 2020-03-16 NOTE — PROGRESS NOTES
SUBJECTIVE:    Patient ID: Mere Hoffmann is a 87 y.o. female.    Chief Complaint: Follow-up (did not bring bottles. refills set up - )    In February this 87-year-old female woke up feeling rather dazed.  She was weak head decrease cognitive functioning.  Her daughter could not get her in to her cardiologist so she saw Dr. Pathak neurology.  MRI was ordered and did reveal an acute right-sided basal ganglial infarct.  She was then able to see Dr. Myers cardiology who felt this was an aspirin and Plavix failure for a CVA.  He switched her to Aggrenox 1 p.o. b.i.d..  However this is cause some nausea requiring Zofran 8 mg prior to her Aggrenox b.i.d. to keep the medicines down.  She has some loose stools upset stomach.  Has generalized weakness some left groin and left calf pain compatible with some with sciatica.  She walks only with a Rollator and has very poor balance      Lab Visit on 03/10/2020   Component Date Value Ref Range Status    Cholesterol 03/10/2020 147  120 - 199 mg/dL Final    Triglycerides 03/10/2020 116  30 - 150 mg/dL Final    HDL 03/10/2020 48  40 - 75 mg/dL Final    LDL Cholesterol 03/10/2020 75.8  63.0 - 159.0 mg/dL Final    Hdl/Cholesterol Ratio 03/10/2020 32.7  20.0 - 50.0 % Final    Total Cholesterol/HDL Ratio 03/10/2020 3.1  2.0 - 5.0 Final    Non-HDL Cholesterol 03/10/2020 99  mg/dL Final   Lab Visit on 02/21/2020   Component Date Value Ref Range Status    Vitamin B-12 02/21/2020 512  210 - 950 pg/mL Final    RPR 02/21/2020 Non-reactive  Non-reactive Final    Folate 02/21/2020 >24.8* 4.0 - 24.0 ng/mL Final   Lab Visit on 02/11/2020   Component Date Value Ref Range Status    Sodium 02/11/2020 139  136 - 145 mmol/L Final    Potassium 02/11/2020 4.1  3.5 - 5.1 mmol/L Final    Chloride 02/11/2020 105  95 - 110 mmol/L Final    CO2 02/11/2020 25  23 - 29 mmol/L Final    Glucose 02/11/2020 112* 70 - 110 mg/dL Final    BUN, Bld 02/11/2020 14  8 - 23 mg/dL Final     Creatinine 02/11/2020 0.8  0.5 - 1.4 mg/dL Final    Calcium 02/11/2020 8.0* 8.7 - 10.5 mg/dL Final    Total Protein 02/11/2020 6.8  6.0 - 8.4 g/dL Final    Albumin 02/11/2020 3.7  3.5 - 5.2 g/dL Final    Total Bilirubin 02/11/2020 0.8  0.1 - 1.0 mg/dL Final    Alkaline Phosphatase 02/11/2020 101  55 - 135 U/L Final    AST 02/11/2020 22  10 - 40 U/L Final    ALT 02/11/2020 27  10 - 44 U/L Final    Anion Gap 02/11/2020 9  8 - 16 mmol/L Final    eGFR if African American 02/11/2020 >60.0  >60 mL/min/1.73 m^2 Final    eGFR if non African American 02/11/2020 >60.0  >60 mL/min/1.73 m^2 Final    WBC 02/11/2020 5.72  3.90 - 12.70 K/uL Final    RBC 02/11/2020 4.59  4.00 - 5.40 M/uL Final    Hemoglobin 02/11/2020 13.2  12.0 - 16.0 g/dL Final    Hematocrit 02/11/2020 40.7  37.0 - 48.5 % Final    Mean Corpuscular Volume 02/11/2020 89  82 - 98 fL Final    Mean Corpuscular Hemoglobin 02/11/2020 28.8  27.0 - 31.0 pg Final    Mean Corpuscular Hemoglobin Conc 02/11/2020 32.4  32.0 - 36.0 g/dL Final    RDW 02/11/2020 13.2  11.5 - 14.5 % Final    Platelets 02/11/2020 240  150 - 350 K/uL Final    MPV 02/11/2020 9.6  9.2 - 12.9 fL Final    Immature Granulocytes 02/11/2020 0.3  0.0 - 0.5 % Final    Gran # (ANC) 02/11/2020 3.4  1.8 - 7.7 K/uL Final    Immature Grans (Abs) 02/11/2020 0.02  0.00 - 0.04 K/uL Final    Lymph # 02/11/2020 1.6  1.0 - 4.8 K/uL Final    Mono # 02/11/2020 0.4  0.3 - 1.0 K/uL Final    Eos # 02/11/2020 0.2  0.0 - 0.5 K/uL Final    Baso # 02/11/2020 0.06  0.00 - 0.20 K/uL Final    nRBC 02/11/2020 0  0 /100 WBC Final    Gran% 02/11/2020 58.8  38.0 - 73.0 % Final    Lymph% 02/11/2020 28.7  18.0 - 48.0 % Final    Mono% 02/11/2020 7.5  4.0 - 15.0 % Final    Eosinophil% 02/11/2020 3.7  0.0 - 8.0 % Final    Basophil% 02/11/2020 1.0  0.0 - 1.9 % Final    Differential Method 02/11/2020 Automated   Final    Magnesium 02/11/2020 2.4  1.6 - 2.6 mg/dL Final    Specimen UA 02/11/2020 Urine, Clean  Catch   Final    Color, UA 02/11/2020 Yellow  Yellow, Straw, Makenna Final    Appearance, UA 02/11/2020 Clear  Clear Final    pH, UA 02/11/2020 7.0  5.0 - 8.0 Final    Specific [place], UA 02/11/2020 1.020  1.005 - 1.030 Final    Protein, UA 02/11/2020 Negative  Negative Final    Glucose, UA 02/11/2020 Negative  Negative Final    Ketones, UA 02/11/2020 Negative  Negative Final    Bilirubin (UA) 02/11/2020 Negative  Negative Final    Occult Blood UA 02/11/2020 Negative  Negative Final    Nitrite, UA 02/11/2020 Negative  Negative Final    Urobilinogen, UA 02/11/2020 Negative  Negative EU/dL Final    Leukocytes, UA 02/11/2020 Trace* Negative Final    RBC, UA 02/11/2020 1  0 - 4 /hpf Final    WBC, UA 02/11/2020 4  0 - 5 /hpf Final    WBC Clumps, UA 02/11/2020 Occasional* None-Rare Final    Bacteria 02/11/2020 Negative  None-Occ /hpf Final    Squam Epithel, UA 02/11/2020 6  /hpf Final    Hyaline Casts, UA 02/11/2020 27* 0-1/lpf /lpf Final    Microscopic Comment 02/11/2020 SEE COMMENT   Final   Lab Visit on 01/29/2020   Component Date Value Ref Range Status    Calcium 01/29/2020 9.1  8.7 - 10.5 mg/dL Final   Hospital Outpatient Visit on 11/07/2019   Component Date Value Ref Range Status    POC Creatinine 11/07/2019 0.7  0.5 - 1.4 mg/dL Final    Sample 11/07/2019 CHRIS   Final   Lab Visit on 11/07/2019   Component Date Value Ref Range Status    Sodium 11/07/2019 142  136 - 145 mmol/L Final    Potassium 11/07/2019 4.0  3.5 - 5.1 mmol/L Final    Chloride 11/07/2019 105  95 - 110 mmol/L Final    CO2 11/07/2019 27  23 - 29 mmol/L Final    Glucose 11/07/2019 96  70 - 110 mg/dL Final    BUN, Bld 11/07/2019 13  8 - 23 mg/dL Final    Creatinine 11/07/2019 0.8  0.5 - 1.4 mg/dL Final    Calcium 11/07/2019 8.3* 8.7 - 10.5 mg/dL Final    Total Protein 11/07/2019 7.0  6.0 - 8.4 g/dL Final    Albumin 11/07/2019 3.9  3.5 - 5.2 g/dL Final    Total Bilirubin 11/07/2019 0.9  0.1 - 1.0 mg/dL Final    Alkaline  Phosphatase 11/07/2019 106  55 - 135 U/L Final    AST 11/07/2019 18  10 - 40 U/L Final    ALT 11/07/2019 22  10 - 44 U/L Final    Anion Gap 11/07/2019 10  8 - 16 mmol/L Final    eGFR if African American 11/07/2019 >60.0  >60 mL/min/1.73 m^2 Final    eGFR if non African American 11/07/2019 >60.0  >60 mL/min/1.73 m^2 Final    Cholesterol 11/07/2019 154  120 - 199 mg/dL Final    Triglycerides 11/07/2019 129  30 - 150 mg/dL Final    HDL 11/07/2019 43  40 - 75 mg/dL Final    LDL Cholesterol 11/07/2019 85.2  63.0 - 159.0 mg/dL Final    Hdl/Cholesterol Ratio 11/07/2019 27.9  20.0 - 50.0 % Final    Total Cholesterol/HDL Ratio 11/07/2019 3.6  2.0 - 5.0 Final    Non-HDL Cholesterol 11/07/2019 111  mg/dL Final    WBC 11/07/2019 6.31  3.90 - 12.70 K/uL Final    RBC 11/07/2019 4.41  4.00 - 5.40 M/uL Final    Hemoglobin 11/07/2019 12.9  12.0 - 16.0 g/dL Final    Hematocrit 11/07/2019 39.4  37.0 - 48.5 % Final    Mean Corpuscular Volume 11/07/2019 89  82 - 98 fL Final    Mean Corpuscular Hemoglobin 11/07/2019 29.3  27.0 - 31.0 pg Final    Mean Corpuscular Hemoglobin Conc 11/07/2019 32.7  32.0 - 36.0 g/dL Final    RDW 11/07/2019 13.3  11.5 - 14.5 % Final    Platelets 11/07/2019 232  150 - 350 K/uL Final    MPV 11/07/2019 9.7  9.2 - 12.9 fL Final    Immature Granulocytes 11/07/2019 0.5  0.0 - 0.5 % Final    Gran # (ANC) 11/07/2019 4.0  1.8 - 7.7 K/uL Final    Immature Grans (Abs) 11/07/2019 0.03  0.00 - 0.04 K/uL Final    Lymph # 11/07/2019 1.6  1.0 - 4.8 K/uL Final    Mono # 11/07/2019 0.4  0.3 - 1.0 K/uL Final    Eos # 11/07/2019 0.3  0.0 - 0.5 K/uL Final    Baso # 11/07/2019 0.07  0.00 - 0.20 K/uL Final    nRBC 11/07/2019 0  0 /100 WBC Final    Gran% 11/07/2019 63.0  38.0 - 73.0 % Final    Lymph% 11/07/2019 24.6  18.0 - 48.0 % Final    Mono% 11/07/2019 6.7  4.0 - 15.0 % Final    Eosinophil% 11/07/2019 4.1  0.0 - 8.0 % Final    Basophil% 11/07/2019 1.1  0.0 - 1.9 % Final    Differential Method  11/07/2019 Automated   Final    BNP 11/07/2019 44  0 - 99 pg/mL Final       History reviewed. No pertinent past medical history.  Past Surgical History:   Procedure Laterality Date    CHOLECYSTECTOMY      moh's surgery  2019    basal cell ca     History reviewed. No pertinent family history.    Marital Status:   Alcohol History:  has no alcohol history on file.  Tobacco History:  reports that she has quit smoking. She has never used smokeless tobacco.  Drug History:  has no drug history on file.    Review of patient's allergies indicates:   Allergen Reactions    Penicillins     Sulfa (sulfonamide antibiotics)        Current Outpatient Medications:     amitriptyline (ELAVIL) 10 MG tablet, Take 2 tablets (20 mg total) by mouth nightly as needed for Insomnia., Disp: 180 tablet, Rfl: 1    atorvastatin (LIPITOR) 40 MG tablet, Take 1 tablet (40 mg total) by mouth once daily., Disp: 90 tablet, Rfl: 1    carvediloL (COREG) 3.125 MG tablet, Take 1 tablet (3.125 mg total) by mouth 2 (two) times daily., Disp: 180 tablet, Rfl: 1    dipyridamole-aspirin 200-25 mg (AGGRENOX)  mg CM12, TK 1 C PO BID, Disp: , Rfl:     famotidine (PEPCID) 20 MG tablet, Take 1 tablet (20 mg total) by mouth nightly as needed for Heartburn., Disp: 90 tablet, Rfl: 1    FLUoxetine 10 MG capsule, Take 1 capsule (10 mg total) by mouth once daily., Disp: 90 capsule, Rfl: 1    gabapentin (NEURONTIN) 100 MG capsule, 1-2 caps p.o. q.h.s. for neuropathy, Disp: 60 capsule, Rfl: 2    ondansetron (ZOFRAN) 4 MG tablet, Take 2 tablets (8 mg total) by mouth every 12 (twelve) hours as needed for Nausea., Disp: 60 tablet, Rfl: 0    pantoprazole (PROTONIX) 40 MG tablet, Take 1 tablet (40 mg total) by mouth once daily., Disp: 90 tablet, Rfl: 1    UNITHROID 75 mcg tablet, Take 1 tablet (75 mcg total) by mouth before breakfast., Disp: 90 tablet, Rfl: 1    Review of Systems   Constitutional: Negative for appetite change, chills, fatigue, fever  "and unexpected weight change.   HENT: Negative for congestion, ear pain, sinus pain, sore throat and trouble swallowing.    Eyes: Negative for pain, discharge and visual disturbance.   Respiratory: Negative for apnea, cough, shortness of breath and wheezing.    Cardiovascular: Negative for chest pain, palpitations and leg swelling.   Gastrointestinal: Negative for abdominal pain, blood in stool, constipation, diarrhea (looser stools  lately), nausea and vomiting.        Nausea , improving w/ Zofran .   Endocrine: Negative for heat intolerance, polydipsia and polyuria.   Genitourinary: Negative for difficulty urinating, dyspareunia, dysuria, frequency, hematuria and menstrual problem.        No nocturia   Musculoskeletal: Negative for arthralgias, back pain, gait problem, joint swelling and myalgias.        Left leg pains , lf ant thigh and calf   Allergic/Immunologic: Negative for environmental allergies, food allergies and immunocompromised state.   Neurological: Positive for dizziness. Negative for tremors, seizures, numbness and headaches.        Cognition worse  Since  CVA, speech is intact.   Psychiatric/Behavioral: Negative for behavioral problems, confusion, hallucinations and suicidal ideas. The patient is not nervous/anxious.           Objective:      Vitals:    03/16/20 1106   BP: 136/84   Pulse: 64   Weight: 66.7 kg (147 lb)   Height: 5' 2.5" (1.588 m)     Body mass index is 26.46 kg/m².  Physical Exam   Constitutional: She is oriented to person, place, and time. She appears well-developed and well-nourished.   Frail elderly female walks with a Rollator   HENT:   Head: Normocephalic and atraumatic.   Right Ear: External ear normal.   Left Ear: External ear normal.   Nose: Nose normal.   Mouth/Throat: Oropharynx is clear and moist.   Eyes: Pupils are equal, round, and reactive to light. EOM are normal.   Neck: Normal range of motion. Neck supple. Carotid bruit is not present. No thyromegaly present. "   Cardiovascular: Normal rate, regular rhythm, normal heart sounds and intact distal pulses.   No murmur heard.  Pulmonary/Chest: Effort normal and breath sounds normal. She has no wheezes. She has no rales.   Abdominal: Soft. Bowel sounds are normal. She exhibits no distension. There is no hepatosplenomegaly. There is no tenderness.   Musculoskeletal: Normal range of motion. She exhibits no tenderness or deformity.        Lumbar back: Normal. She exhibits no pain and no spasm.   Bends 90 degrees at  waist shoulders have good range of motion knees are somewhat stiff and have decreased flexion.  There is no pitting edema present   Lymphadenopathy:     She has no cervical adenopathy.   Neurological: She is alert and oriented to person, place, and time. No cranial nerve deficit. Coordination normal.   Has a small step gait with a Rollator.   Skin: Skin is warm and dry. No rash noted.   Psychiatric: She has a normal mood and affect. Her behavior is normal. Judgment and thought content normal.   Nursing note and vitals reviewed.        Assessment:       1. Stroke of right basal ganglia    2. Hypothyroidism, unspecified type    3. Gastroesophageal reflux disease without esophagitis    4. Anxiety disorder, unspecified type    5. Atherosclerotic heart disease of native coronary artery with other forms of angina pectoris    6. Hyperlipidemia, unspecified hyperlipidemia type    7. Nausea    8. Mild cognitive impairment    9. Neuropathy    10. Essential hypertension    11. Age-related osteoporosis without current pathological fracture    12. Menopausal state         Plan:       Stroke of right basal ganglia  Patient had sustained a small right basal ganglia stroke approximately 1 month ago.  Her daughter is an RN and will monitor her progress closely at home.  Continue Aggrenox twice a day for the next 7-10 days.  If Aggrenox  is unable to tolerated by that time she will have to call cardiologist for another  option.  Hypothyroidism, unspecified type  -     UNITHROID 75 mcg tablet; Take 1 tablet (75 mcg total) by mouth before breakfast.  Dispense: 90 tablet; Refill: 1  Continue unithroid  Gastroesophageal reflux disease without esophagitis  -     pantoprazole (PROTONIX) 40 MG tablet; Take 1 tablet (40 mg total) by mouth once daily.  Dispense: 90 tablet; Refill: 1  Continue pantoprazoleAnxiety disorder, unspecified type  -     FLUoxetine 10 MG capsule; Take 1 capsule (10 mg total) by mouth once daily.  Dispense: 90 capsule; Refill: 1  -     amitriptyline (ELAVIL) 10 MG tablet; Take 2 tablets (20 mg total) by mouth nightly as needed for Insomnia.  Dispense: 180 tablet; Refill: 1  Refill fluoxetine and in no  Atherosclerotic heart disease of native coronary artery with other forms of angina pectoris  -     carvediloL (COREG) 3.125 MG tablet; Take 1 tablet (3.125 mg total) by mouth 2 (two) times daily.  Dispense: 180 tablet; Refill: 1    Hyperlipidemia, unspecified hyperlipidemia type  -     atorvastatin (LIPITOR) 40 MG tablet; Take 1 tablet (40 mg total) by mouth once daily.  Dispense: 90 tablet; Refill: 1    Nausea  -     ondansetron (ZOFRAN) 4 MG tablet; Take 2 tablets (8 mg total) by mouth every 12 (twelve) hours as needed for Nausea.  Dispense: 60 tablet; Refill: 0  Will give a mg of Zofran prior to the Aggrenox dosing  Mild cognitive impairment  Stable but impaired judgment  Neuropathy  -     gabapentin (NEURONTIN) 100 MG capsule; 1-2 caps p.o. q.h.s. for neuropathy  Dispense: 60 capsule; Refill: 2  Trial of gabapentin 1-2 capsules HS only  Essential hypertension  Blood pressure well controlled  Age-related osteoporosis without current pathological fracture    Menopausal state      Follow up in about 3 months (around 6/16/2020) for NP.

## 2020-03-16 NOTE — TELEPHONE ENCOUNTER
Spoke with pts daughter about cancelling her OV tomorrow with Dr. Kraus.   Daughter has been giving her Zofran 8mg twice daily that she will need a refill on since taking Aggrenox. It is really rough on her, has been leaving her extremely nauseated.  Daughter is very concerned about missing the visit tomorrow because she has many concerns. Agrees to a tele-medicine visit but only if it will still be tomorrow.  She has also been complaining about severe groin pain - she has had a bunch of lab work for Dr. Pathak and Dr. Cortés, also has had xrays - neither of those doctors treat these issues but they ordered them to be discussed with Dr. Kraus at this visit.

## 2020-04-07 DIAGNOSIS — R11.0 NAUSEA: ICD-10-CM

## 2020-04-07 RX ORDER — ONDANSETRON 4 MG/1
8 TABLET, FILM COATED ORAL EVERY 12 HOURS PRN
Qty: 60 TABLET | Refills: 0 | Status: SHIPPED | OUTPATIENT
Start: 2020-04-07 | End: 2021-01-18

## 2020-04-07 NOTE — TELEPHONE ENCOUNTER
----- Message from Radha Nevarez sent at 4/7/2020 12:38 PM CDT -----  Contact: Mere Hoffmann  Needs a refill on Zofran 4 mg twice a day   Send to [facility] [facility] on    Pt# [phone]

## 2020-07-01 ENCOUNTER — TELEPHONE (OUTPATIENT)
Dept: FAMILY MEDICINE | Facility: CLINIC | Age: 85
End: 2020-07-01

## 2020-07-01 DIAGNOSIS — Z79.899 ENCOUNTER FOR LONG-TERM (CURRENT) USE OF OTHER MEDICATIONS: Primary | ICD-10-CM

## 2020-07-01 NOTE — TELEPHONE ENCOUNTER
Spoke to pts daughter to let her know pt is due to have fasting labs before her next office visit. pts daughter stated pt gets labs done at [facility] imaging. Let pts daughter know the labs were [facility] to [facility], but we can put in new orders for pt. Pts daughter stated she wants lab orders for pt printed out and she will  tomorrow.

## 2020-07-08 ENCOUNTER — TELEPHONE (OUTPATIENT)
Dept: FAMILY MEDICINE | Facility: CLINIC | Age: 85
End: 2020-07-08

## 2020-07-20 ENCOUNTER — LAB VISIT (OUTPATIENT)
Dept: LAB | Facility: HOSPITAL | Age: 85
End: 2020-07-20
Attending: INTERNAL MEDICINE
Payer: MEDICARE

## 2020-07-20 DIAGNOSIS — Z79.899 ENCOUNTER FOR LONG-TERM (CURRENT) USE OF OTHER MEDICATIONS: ICD-10-CM

## 2020-07-20 DIAGNOSIS — M81.0 SENILE OSTEOPOROSIS: ICD-10-CM

## 2020-07-20 DIAGNOSIS — E03.9 MYXEDEMA HEART DISEASE: Primary | ICD-10-CM

## 2020-07-20 DIAGNOSIS — E83.51 HYPOCALCEMIA: ICD-10-CM

## 2020-07-20 DIAGNOSIS — I51.9 MYXEDEMA HEART DISEASE: Primary | ICD-10-CM

## 2020-07-20 LAB
25(OH)D3+25(OH)D2 SERPL-MCNC: 52 NG/ML (ref 30–96)
ALBUMIN SERPL BCP-MCNC: 3.7 G/DL (ref 3.5–5.2)
ALBUMIN SERPL BCP-MCNC: 3.8 G/DL (ref 3.5–5.2)
ALP SERPL-CCNC: 92 U/L (ref 55–135)
ALT SERPL W/O P-5'-P-CCNC: 26 U/L (ref 10–44)
ANION GAP SERPL CALC-SCNC: 6 MMOL/L (ref 8–16)
ANION GAP SERPL CALC-SCNC: 8 MMOL/L (ref 8–16)
AST SERPL-CCNC: 19 U/L (ref 10–40)
BASOPHILS # BLD AUTO: 0.05 K/UL (ref 0–0.2)
BASOPHILS NFR BLD: 0.8 % (ref 0–1.9)
BILIRUB SERPL-MCNC: 0.7 MG/DL (ref 0.1–1)
BUN SERPL-MCNC: 13 MG/DL (ref 8–23)
BUN SERPL-MCNC: 14 MG/DL (ref 8–23)
CALCIUM SERPL-MCNC: 9 MG/DL (ref 8.7–10.5)
CALCIUM SERPL-MCNC: 9.1 MG/DL (ref 8.7–10.5)
CHLORIDE SERPL-SCNC: 108 MMOL/L (ref 95–110)
CHLORIDE SERPL-SCNC: 108 MMOL/L (ref 95–110)
CHOLEST SERPL-MCNC: 138 MG/DL (ref 120–199)
CHOLEST/HDLC SERPL: 3.1 {RATIO} (ref 2–5)
CO2 SERPL-SCNC: 26 MMOL/L (ref 23–29)
CO2 SERPL-SCNC: 27 MMOL/L (ref 23–29)
CREAT SERPL-MCNC: 0.8 MG/DL (ref 0.5–1.4)
CREAT SERPL-MCNC: 0.8 MG/DL (ref 0.5–1.4)
DIFFERENTIAL METHOD: NORMAL
EOSINOPHIL # BLD AUTO: 0.2 K/UL (ref 0–0.5)
EOSINOPHIL NFR BLD: 4 % (ref 0–8)
ERYTHROCYTE [DISTWIDTH] IN BLOOD BY AUTOMATED COUNT: 12.9 % (ref 11.5–14.5)
EST. GFR  (AFRICAN AMERICAN): >60 ML/MIN/1.73 M^2
EST. GFR  (AFRICAN AMERICAN): >60 ML/MIN/1.73 M^2
EST. GFR  (NON AFRICAN AMERICAN): >60 ML/MIN/1.73 M^2
EST. GFR  (NON AFRICAN AMERICAN): >60 ML/MIN/1.73 M^2
GLUCOSE SERPL-MCNC: 105 MG/DL (ref 70–110)
GLUCOSE SERPL-MCNC: 107 MG/DL (ref 70–110)
HCT VFR BLD AUTO: 38.8 % (ref 37–48.5)
HDLC SERPL-MCNC: 45 MG/DL (ref 40–75)
HDLC SERPL: 32.6 % (ref 20–50)
HGB BLD-MCNC: 12.6 G/DL (ref 12–16)
IMM GRANULOCYTES # BLD AUTO: 0.01 K/UL (ref 0–0.04)
IMM GRANULOCYTES NFR BLD AUTO: 0.2 % (ref 0–0.5)
LDLC SERPL CALC-MCNC: 69.6 MG/DL (ref 63–159)
LYMPHOCYTES # BLD AUTO: 1.8 K/UL (ref 1–4.8)
LYMPHOCYTES NFR BLD: 30.4 % (ref 18–48)
MCH RBC QN AUTO: 29.7 PG (ref 27–31)
MCHC RBC AUTO-ENTMCNC: 32.5 G/DL (ref 32–36)
MCV RBC AUTO: 92 FL (ref 82–98)
MONOCYTES # BLD AUTO: 0.5 K/UL (ref 0.3–1)
MONOCYTES NFR BLD: 7.7 % (ref 4–15)
NEUTROPHILS # BLD AUTO: 3.4 K/UL (ref 1.8–7.7)
NEUTROPHILS NFR BLD: 56.9 % (ref 38–73)
NONHDLC SERPL-MCNC: 93 MG/DL
NRBC BLD-RTO: 0 /100 WBC
PLATELET # BLD AUTO: 222 K/UL (ref 150–350)
PMV BLD AUTO: 9.3 FL (ref 9.2–12.9)
POTASSIUM SERPL-SCNC: 4.2 MMOL/L (ref 3.5–5.1)
POTASSIUM SERPL-SCNC: 4.2 MMOL/L (ref 3.5–5.1)
PROT SERPL-MCNC: 6.9 G/DL (ref 6–8.4)
RBC # BLD AUTO: 4.24 M/UL (ref 4–5.4)
SODIUM SERPL-SCNC: 141 MMOL/L (ref 136–145)
SODIUM SERPL-SCNC: 142 MMOL/L (ref 136–145)
TRIGL SERPL-MCNC: 117 MG/DL (ref 30–150)
TSH SERPL DL<=0.005 MIU/L-ACNC: 2.59 UIU/ML (ref 0.34–5.6)
TSH SERPL DL<=0.005 MIU/L-ACNC: 2.62 UIU/ML (ref 0.34–5.6)
WBC # BLD AUTO: 5.98 K/UL (ref 3.9–12.7)

## 2020-07-20 PROCEDURE — 84443 ASSAY THYROID STIM HORMONE: CPT | Mod: 91

## 2020-07-20 PROCEDURE — 80048 BASIC METABOLIC PNL TOTAL CA: CPT

## 2020-07-20 PROCEDURE — 80061 LIPID PANEL: CPT

## 2020-07-20 PROCEDURE — 85025 COMPLETE CBC W/AUTO DIFF WBC: CPT

## 2020-07-20 PROCEDURE — 84443 ASSAY THYROID STIM HORMONE: CPT

## 2020-07-20 PROCEDURE — 80053 COMPREHEN METABOLIC PANEL: CPT

## 2020-07-20 PROCEDURE — 36415 COLL VENOUS BLD VENIPUNCTURE: CPT

## 2020-07-20 PROCEDURE — 82306 VITAMIN D 25 HYDROXY: CPT

## 2020-07-20 PROCEDURE — 82040 ASSAY OF SERUM ALBUMIN: CPT

## 2020-07-28 ENCOUNTER — TELEPHONE (OUTPATIENT)
Dept: FAMILY MEDICINE | Facility: CLINIC | Age: 85
End: 2020-07-28

## 2020-07-28 NOTE — TELEPHONE ENCOUNTER
Spoke to patient's daughter, Marlin, who understood the message.  She made an appointment for January for a check up/ba

## 2020-07-28 NOTE — TELEPHONE ENCOUNTER
----- Message from Vidal Kraus MD sent at 7/27/2020  7:58 PM CDT -----  Call patient.  Her cholesterol looks excellent at 138.  CBC looks normal with no anemia.  Blood sugar, kidneys, liver and thyroid all look normal.  Continue as is

## 2020-07-31 ENCOUNTER — LAB VISIT (OUTPATIENT)
Dept: LAB | Facility: HOSPITAL | Age: 85
End: 2020-07-31
Attending: FAMILY MEDICINE
Payer: MEDICARE

## 2020-07-31 ENCOUNTER — TELEPHONE (OUTPATIENT)
Dept: FAMILY MEDICINE | Facility: CLINIC | Age: 85
End: 2020-07-31

## 2020-07-31 DIAGNOSIS — R35.0 FREQUENT URINATION: Primary | ICD-10-CM

## 2020-07-31 DIAGNOSIS — R35.0 FREQUENT URINATION: ICD-10-CM

## 2020-07-31 LAB
BACTERIA #/AREA URNS HPF: NEGATIVE /HPF
BILIRUB UR QL STRIP: NEGATIVE
CLARITY UR: CLEAR
COLOR UR: YELLOW
GLUCOSE UR QL STRIP: NEGATIVE
HGB UR QL STRIP: NEGATIVE
HYALINE CASTS #/AREA URNS LPF: 2 /LPF
KETONES UR QL STRIP: NEGATIVE
LEUKOCYTE ESTERASE UR QL STRIP: ABNORMAL
MICROSCOPIC COMMENT: ABNORMAL
NITRITE UR QL STRIP: NEGATIVE
PH UR STRIP: 6 [PH] (ref 5–8)
PROT UR QL STRIP: NEGATIVE
RBC #/AREA URNS HPF: 2 /HPF (ref 0–4)
SP GR UR STRIP: 1.01 (ref 1–1.03)
SQUAMOUS #/AREA URNS HPF: 1 /HPF
URN SPEC COLLECT METH UR: ABNORMAL
UROBILINOGEN UR STRIP-ACNC: NEGATIVE EU/DL
WBC #/AREA URNS HPF: 2 /HPF (ref 0–5)

## 2020-07-31 PROCEDURE — 81001 URINALYSIS AUTO W/SCOPE: CPT

## 2020-07-31 NOTE — TELEPHONE ENCOUNTER
----- Message from Yancy Jade sent at 7/31/2020  9:49 AM CDT -----  Regarding: orders  PT daughter is wanting to bring an urine sample to [facility] but is needing orders sent to [facility]. Pt has been having has been having accidents and is burning. That's why she wants to bring the sample   Pt daughter [phone]

## 2020-08-03 ENCOUNTER — TELEPHONE (OUTPATIENT)
Dept: FAMILY MEDICINE | Facility: CLINIC | Age: 85
End: 2020-08-03

## 2020-08-03 NOTE — TELEPHONE ENCOUNTER
----- Message from Vidal Kraus MD sent at 8/2/2020 11:16 AM CDT -----  Call patient's daughter.  Her urinalysis showed no significant bladder infection.

## 2020-08-21 ENCOUNTER — INFUSION (OUTPATIENT)
Dept: INFUSION THERAPY | Facility: HOSPITAL | Age: 85
End: 2020-08-21
Attending: INTERNAL MEDICINE
Payer: MEDICARE

## 2020-08-21 VITALS
SYSTOLIC BLOOD PRESSURE: 156 MMHG | BODY MASS INDEX: 28.19 KG/M2 | DIASTOLIC BLOOD PRESSURE: 63 MMHG | OXYGEN SATURATION: 97 % | TEMPERATURE: 98 F | HEART RATE: 73 BPM | RESPIRATION RATE: 18 BRPM | WEIGHT: 156.63 LBS

## 2020-08-21 DIAGNOSIS — M81.0 AGE-RELATED OSTEOPOROSIS WITHOUT CURRENT PATHOLOGICAL FRACTURE: Primary | ICD-10-CM

## 2020-08-21 PROCEDURE — 96372 THER/PROPH/DIAG INJ SC/IM: CPT

## 2020-08-21 PROCEDURE — 63600175 PHARM REV CODE 636 W HCPCS: Mod: JG | Performed by: INTERNAL MEDICINE

## 2020-08-21 RX ADMIN — DENOSUMAB 60 MG: 60 INJECTION SUBCUTANEOUS at 01:08

## 2020-08-21 NOTE — PLAN OF CARE
Problem: Fall Injury Risk  Goal: Absence of Fall and Fall-Related Injury  Outcome: Ongoing, Progressing  Intervention: Identify and Manage Contributors to Fall Injury Risk  Flowsheets (Taken 8/21/2020 1303)  Self-Care Promotion: independence encouraged  Medication Review/Management: medications reviewed

## 2020-09-28 ENCOUNTER — PATIENT MESSAGE (OUTPATIENT)
Dept: FAMILY MEDICINE | Facility: CLINIC | Age: 85
End: 2020-09-28

## 2020-09-28 DIAGNOSIS — F41.9 ANXIETY DISORDER, UNSPECIFIED TYPE: ICD-10-CM

## 2020-09-28 RX ORDER — ASPIRIN AND DIPYRIDAMOLE 25; 200 MG/1; MG/1
CAPSULE, EXTENDED RELEASE ORAL
Qty: 180 CAPSULE | Refills: 1 | Status: SHIPPED | OUTPATIENT
Start: 2020-09-28 | End: 2021-01-18 | Stop reason: SDUPTHER

## 2020-09-28 RX ORDER — AMITRIPTYLINE HYDROCHLORIDE 10 MG/1
20 TABLET, FILM COATED ORAL NIGHTLY PRN
Qty: 180 TABLET | Refills: 1 | Status: SHIPPED | OUTPATIENT
Start: 2020-09-28 | End: 2021-01-18 | Stop reason: SDUPTHER

## 2020-10-01 ENCOUNTER — OFFICE VISIT (OUTPATIENT)
Dept: CARDIOLOGY | Facility: CLINIC | Age: 85
End: 2020-10-01
Payer: MEDICARE

## 2020-10-01 VITALS
HEIGHT: 62 IN | WEIGHT: 155 LBS | OXYGEN SATURATION: 96 % | SYSTOLIC BLOOD PRESSURE: 134 MMHG | DIASTOLIC BLOOD PRESSURE: 74 MMHG | RESPIRATION RATE: 16 BRPM | BODY MASS INDEX: 28.52 KG/M2 | HEART RATE: 76 BPM

## 2020-10-01 DIAGNOSIS — I25.118 CORONARY ARTERY DISEASE OF NATIVE ARTERY OF NATIVE HEART WITH STABLE ANGINA PECTORIS: Primary | ICD-10-CM

## 2020-10-01 DIAGNOSIS — I10 ESSENTIAL HYPERTENSION: ICD-10-CM

## 2020-10-01 DIAGNOSIS — E78.2 MIXED HYPERLIPIDEMIA: ICD-10-CM

## 2020-10-01 DIAGNOSIS — I63.311 CEREBROVASCULAR ACCIDENT (CVA) DUE TO THROMBOSIS OF RIGHT MIDDLE CEREBRAL ARTERY: ICD-10-CM

## 2020-10-01 PROCEDURE — 99213 PR OFFICE/OUTPT VISIT, EST, LEVL III, 20-29 MIN: ICD-10-PCS | Mod: S$GLB,,, | Performed by: INTERNAL MEDICINE

## 2020-10-01 PROCEDURE — 99213 OFFICE O/P EST LOW 20 MIN: CPT | Mod: S$GLB,,, | Performed by: INTERNAL MEDICINE

## 2020-10-01 NOTE — PROGRESS NOTES
Subjective:    Patient ID:  Mere Hoffmann is a 87 y.o. female who presents for   Hypertension and Coronary Artery Disease    HPI episode of angina in the past 6 months.  Has been mostly housebound due to the COVID pandemic.  Has mild diarrhea.  No postural dizziness.    Review of patient's allergies indicates:   Allergen Reactions    Penicillins     Sulfa (sulfonamide antibiotics)        History reviewed. No pertinent past medical history.  Past Surgical History:   Procedure Laterality Date    CHOLECYSTECTOMY      moh's surgery  2019    basal cell ca     Social History     Tobacco Use    Smoking status: Former Smoker    Smokeless tobacco: Never Used   Substance Use Topics    Alcohol use: Not on file    Drug use: Not on file        Review of Systems     Review of Systems   Constitution: Negative for weight gain and weight loss.   HENT: Negative for congestion, nosebleeds and sore throat.    Eyes: Negative for visual disturbance.   Cardiovascular: Positive for chest pain. Negative for dyspnea on exertion, palpitations and syncope.   Respiratory: Negative for cough, shortness of breath and wheezing.    Skin: Negative for rash.   Musculoskeletal: Positive for muscle weakness (proximal muscles of LE). Negative for back pain, gout, joint pain and myalgias.   Gastrointestinal: Positive for diarrhea. Negative for heartburn, hematochezia and nausea.   Genitourinary: Negative for frequency, hematuria and nocturia.   Neurological: Negative for dizziness and tremors.   Psychiatric/Behavioral: Positive for memory loss.   Allergic/Immunologic: Negative for environmental allergies (Seasonal Allergies).           Objective:        Vitals:    10/01/20 1447   BP: 134/74   Pulse: 76   Resp: 16       Lab Results   Component Value Date    WBC 5.98 07/20/2020    HGB 12.6 07/20/2020    HCT 38.8 07/20/2020     07/20/2020    CHOL 138 07/20/2020    TRIG 117 07/20/2020    HDL 45 07/20/2020    ALT 26 07/20/2020    AST 19  07/20/2020     07/20/2020     07/20/2020    K 4.2 07/20/2020    K 4.2 07/20/2020     07/20/2020     07/20/2020    CREATININE 0.8 07/20/2020    CREATININE 0.8 07/20/2020    BUN 13 07/20/2020    BUN 14 07/20/2020    CO2 26 07/20/2020    CO2 27 07/20/2020    TSH 2.590 07/20/2020    TSH 2.620 07/20/2020        ECHOCARDIOGRAM RESULTS  No results found for this or any previous visit.    CURRENT/PREVIOUS VISIT EKG  No results found for this or any previous visit.  No results found for this or any previous visit.  No results found for this or any previous visit.    PHYSICAL EXAM    Physical Exam   Constitutional: She is oriented to person, place, and time. She appears well-nourished.   Eyes: Conjunctivae are normal.   Neck: Carotid bruit is not present.   Cardiovascular: Normal heart sounds. Exam reveals no gallop.   No murmur heard.  Pulmonary/Chest: Effort normal.   Neurological: She is alert and oriented to person, place, and time.   Psychiatric: She has a normal mood and affect.        Medication List with Changes/Refills   Current Medications    AMITRIPTYLINE (ELAVIL) 10 MG TABLET    Take 2 tablets (20 mg total) by mouth nightly as needed for Insomnia.    ATORVASTATIN (LIPITOR) 40 MG TABLET    Take 1 tablet (40 mg total) by mouth once daily.    CARVEDILOL (COREG) 3.125 MG TABLET    Take 1 tablet (3.125 mg total) by mouth 2 (two) times daily.    DIPYRIDAMOLE-ASPIRIN 200-25 MG (AGGRENOX)  MG CM12    TK 1 C PO BID    FAMOTIDINE (PEPCID) 20 MG TABLET    Take 1 tablet (20 mg total) by mouth nightly as needed for Heartburn.    FLUOXETINE 10 MG CAPSULE    Take 1 capsule (10 mg total) by mouth once daily.    GABAPENTIN (NEURONTIN) 100 MG CAPSULE    1-2 caps p.o. q.h.s. for neuropathy    ONDANSETRON (ZOFRAN) 4 MG TABLET    Take 2 tablets (8 mg total) by mouth every 12 (twelve) hours as needed for Nausea.    PANTOPRAZOLE (PROTONIX) 40 MG TABLET    Take 1 tablet (40 mg total) by mouth once daily.     UNITHROID 75 MCG TABLET    Take 1 tablet (75 mcg total) by mouth before breakfast.           Assessment:       1. Coronary artery disease of native artery of native heart with stable angina pectoris    2. Cerebrovascular accident (CVA) due to thrombosis of right middle cerebral artery    3. Mixed hyperlipidemia    4. Essential hypertension         Plan:  Okay to take probiotic for diarrhea.  Her blood pressure appears to on in the 140s I feel disease okay as when we tried to get the systolic down to 120 she has had syncopal episodes and postural dizziness.  I have asked her to increase her activities to strength legs       Problem List Items Addressed This Visit        Cardiac/Vascular    Essential hypertension    Hyperlipidemia, unspecified      Other Visit Diagnoses     Coronary artery disease of native artery of native heart with stable angina pectoris    -  Primary    Cerebrovascular accident (CVA) due to thrombosis of right middle cerebral artery               Follow up in about 4 months (around 2/1/2021) for Routine follow up.

## 2020-11-13 ENCOUNTER — PATIENT MESSAGE (OUTPATIENT)
Dept: FAMILY MEDICINE | Facility: CLINIC | Age: 85
End: 2020-11-13

## 2020-11-13 ENCOUNTER — TELEPHONE (OUTPATIENT)
Dept: FAMILY MEDICINE | Facility: CLINIC | Age: 85
End: 2020-11-13

## 2020-11-13 NOTE — TELEPHONE ENCOUNTER
----- Message from Yancy Jade sent at 11/13/2020 11:45 AM CST -----  Regarding: needing appt  Pt is needing p/t/ at home through home health pt is needing to set up an office visit to have the insurance to pay for it   Pt daughter [phone]

## 2020-11-18 ENCOUNTER — TELEPHONE (OUTPATIENT)
Dept: FAMILY MEDICINE | Facility: CLINIC | Age: 85
End: 2020-11-18

## 2020-11-18 ENCOUNTER — OFFICE VISIT (OUTPATIENT)
Dept: FAMILY MEDICINE | Facility: CLINIC | Age: 85
End: 2020-11-18
Payer: MEDICARE

## 2020-11-18 VITALS
HEART RATE: 60 BPM | WEIGHT: 149 LBS | BODY MASS INDEX: 27.42 KG/M2 | HEIGHT: 62 IN | DIASTOLIC BLOOD PRESSURE: 68 MMHG | SYSTOLIC BLOOD PRESSURE: 138 MMHG

## 2020-11-18 DIAGNOSIS — N95.1 MENOPAUSAL STATE: ICD-10-CM

## 2020-11-18 DIAGNOSIS — F41.9 ANXIETY DISORDER, UNSPECIFIED TYPE: ICD-10-CM

## 2020-11-18 DIAGNOSIS — I63.81 STROKE OF RIGHT BASAL GANGLIA: Primary | ICD-10-CM

## 2020-11-18 DIAGNOSIS — I10 ESSENTIAL HYPERTENSION: ICD-10-CM

## 2020-11-18 DIAGNOSIS — Z12.31 SCREENING MAMMOGRAM FOR HIGH-RISK PATIENT: ICD-10-CM

## 2020-11-18 DIAGNOSIS — R26.9 GAIT DISTURBANCE: ICD-10-CM

## 2020-11-18 DIAGNOSIS — E78.5 HYPERLIPIDEMIA, UNSPECIFIED HYPERLIPIDEMIA TYPE: ICD-10-CM

## 2020-11-18 DIAGNOSIS — G31.84 MILD COGNITIVE IMPAIRMENT: ICD-10-CM

## 2020-11-18 DIAGNOSIS — M81.0 AGE-RELATED OSTEOPOROSIS WITHOUT CURRENT PATHOLOGICAL FRACTURE: ICD-10-CM

## 2020-11-18 DIAGNOSIS — I25.118 ATHEROSCLEROTIC HEART DISEASE OF NATIVE CORONARY ARTERY WITH OTHER FORMS OF ANGINA PECTORIS: ICD-10-CM

## 2020-11-18 DIAGNOSIS — E03.9 ACQUIRED HYPOTHYROIDISM: ICD-10-CM

## 2020-11-18 PROCEDURE — 99214 OFFICE O/P EST MOD 30 MIN: CPT | Mod: S$GLB,,, | Performed by: FAMILY MEDICINE

## 2020-11-18 PROCEDURE — 99214 PR OFFICE/OUTPT VISIT, EST, LEVL IV, 30-39 MIN: ICD-10-PCS | Mod: S$GLB,,, | Performed by: FAMILY MEDICINE

## 2020-11-18 NOTE — TELEPHONE ENCOUNTER
Radha Nevarez  You 8 minutes ago (10:40 AM)     Stephanie Baptiste says to disregard previous message

## 2020-11-18 NOTE — PROGRESS NOTES
SUBJECTIVE:    Patient ID: Mere Hoffmann is a 87 y.o. female.    Chief Complaint: Follow-up (no bottles, refills needed, DJ)    This 87-year-old female suffered a right basal ganglia stroke earlier in the year.  She has residual left-sided weakness, cognitive slowing.  She takes Aggrenox p.o. b.i.d. as she was a Plavix and aspirin failure.  She requires minimal assist for transfers but needs a cyst in the shower.  Her daughter takes care of her and cooks for her.  She noticed decreased cognition and decreased memory.  They are interested in  home health physical therapy.      Lab Visit on 07/31/2020   Component Date Value Ref Range Status    Specimen UA 07/31/2020 Urine, Clean Catch   Final    Color, UA 07/31/2020 Yellow  Yellow, Straw, Makenna Final    Appearance, UA 07/31/2020 Clear  Clear Final    pH, UA 07/31/2020 6.0  5.0 - 8.0 Final    Specific [place], UA 07/31/2020 1.010  1.005 - 1.030 Final    Protein, UA 07/31/2020 Negative  Negative Final    Glucose, UA 07/31/2020 Negative  Negative Final    Ketones, UA 07/31/2020 Negative  Negative Final    Bilirubin (UA) 07/31/2020 Negative  Negative Final    Occult Blood UA 07/31/2020 Negative  Negative Final    Nitrite, UA 07/31/2020 Negative  Negative Final    Urobilinogen, UA 07/31/2020 Negative  Negative EU/dL Final    Leukocytes, UA 07/31/2020 1+* Negative Final    RBC, UA 07/31/2020 2  0 - 4 /hpf Final    WBC, UA 07/31/2020 2  0 - 5 /hpf Final    Bacteria 07/31/2020 Negative  None-Occ /hpf Final    Squam Epithel, UA 07/31/2020 1  /hpf Final    Hyaline Casts, UA 07/31/2020 2* 0-1/lpf /lpf Final    Microscopic Comment 07/31/2020 SEE COMMENT   Final   Lab Visit on 07/20/2020   Component Date Value Ref Range Status    Cholesterol 07/20/2020 138  120 - 199 mg/dL Final    Triglycerides 07/20/2020 117  30 - 150 mg/dL Final    HDL 07/20/2020 45  40 - 75 mg/dL Final    LDL Cholesterol 07/20/2020 69.6  63.0 - 159.0 mg/dL Final    HDL/Cholesterol  Ratio 07/20/2020 32.6  20.0 - 50.0 % Final    Total Cholesterol/HDL Ratio 07/20/2020 3.1  2.0 - 5.0 Final    Non-HDL Cholesterol 07/20/2020 93  mg/dL Final    Sodium 07/20/2020 142  136 - 145 mmol/L Final    Potassium 07/20/2020 4.2  3.5 - 5.1 mmol/L Final    Chloride 07/20/2020 108  95 - 110 mmol/L Final    CO2 07/20/2020 26  23 - 29 mmol/L Final    Glucose 07/20/2020 105  70 - 110 mg/dL Final    BUN 07/20/2020 13  8 - 23 mg/dL Final    Creatinine 07/20/2020 0.8  0.5 - 1.4 mg/dL Final    Calcium 07/20/2020 9.0  8.7 - 10.5 mg/dL Final    Total Protein 07/20/2020 6.9  6.0 - 8.4 g/dL Final    Albumin 07/20/2020 3.8  3.5 - 5.2 g/dL Final    Total Bilirubin 07/20/2020 0.7  0.1 - 1.0 mg/dL Final    Alkaline Phosphatase 07/20/2020 92  55 - 135 U/L Final    AST 07/20/2020 19  10 - 40 U/L Final    ALT 07/20/2020 26  10 - 44 U/L Final    Anion Gap 07/20/2020 8  8 - 16 mmol/L Final    eGFR if African American 07/20/2020 >60.0  >60 mL/min/1.73 m^2 Final    eGFR if non African American 07/20/2020 >60.0  >60 mL/min/1.73 m^2 Final    WBC 07/20/2020 5.98  3.90 - 12.70 K/uL Final    RBC 07/20/2020 4.24  4.00 - 5.40 M/uL Final    Hemoglobin 07/20/2020 12.6  12.0 - 16.0 g/dL Final    Hematocrit 07/20/2020 38.8  37.0 - 48.5 % Final    MCV 07/20/2020 92  82 - 98 fL Final    MCH 07/20/2020 29.7  27.0 - 31.0 pg Final    MCHC 07/20/2020 32.5  32.0 - 36.0 g/dL Final    RDW 07/20/2020 12.9  11.5 - 14.5 % Final    Platelets 07/20/2020 222  150 - 350 K/uL Final    MPV 07/20/2020 9.3  9.2 - 12.9 fL Final    Immature Granulocytes 07/20/2020 0.2  0.0 - 0.5 % Final    Gran # (ANC) 07/20/2020 3.4  1.8 - 7.7 K/uL Final    Immature Grans (Abs) 07/20/2020 0.01  0.00 - 0.04 K/uL Final    Lymph # 07/20/2020 1.8  1.0 - 4.8 K/uL Final    Mono # 07/20/2020 0.5  0.3 - 1.0 K/uL Final    Eos # 07/20/2020 0.2  0.0 - 0.5 K/uL Final    Baso # 07/20/2020 0.05  0.00 - 0.20 K/uL Final    nRBC 07/20/2020 0  0 /100 WBC Final     Gran % 07/20/2020 56.9  38.0 - 73.0 % Final    Lymph % 07/20/2020 30.4  18.0 - 48.0 % Final    Mono % 07/20/2020 7.7  4.0 - 15.0 % Final    Eosinophil % 07/20/2020 4.0  0.0 - 8.0 % Final    Basophil % 07/20/2020 0.8  0.0 - 1.9 % Final    Differential Method 07/20/2020 Automated   Final    TSH 07/20/2020 2.590  0.340 - 5.600 uIU/mL Final    Sodium 07/20/2020 141  136 - 145 mmol/L Final    Potassium 07/20/2020 4.2  3.5 - 5.1 mmol/L Final    Chloride 07/20/2020 108  95 - 110 mmol/L Final    CO2 07/20/2020 27  23 - 29 mmol/L Final    Glucose 07/20/2020 107  70 - 110 mg/dL Final    BUN 07/20/2020 14  8 - 23 mg/dL Final    Creatinine 07/20/2020 0.8  0.5 - 1.4 mg/dL Final    Calcium 07/20/2020 9.1  8.7 - 10.5 mg/dL Final    Anion Gap 07/20/2020 6* 8 - 16 mmol/L Final    eGFR if African American 07/20/2020 >60.0  >60 mL/min/1.73 m^2 Final    eGFR if non African American 07/20/2020 >60.0  >60 mL/min/1.73 m^2 Final    Albumin 07/20/2020 3.7  3.5 - 5.2 g/dL Final    TSH 07/20/2020 2.620  0.340 - 5.600 uIU/mL Final    Vit D, 25-Hydroxy 07/20/2020 52  30 - 96 ng/mL Final       History reviewed. No pertinent past medical history.  Social History     Socioeconomic History    Marital status:      Spouse name: Not on file    Number of children: Not on file    Years of education: Not on file    Highest education level: Not on file   Occupational History    Not on file   Social Needs    Financial resource strain: Not on file    Food insecurity     Worry: Not on file     Inability: Not on file    Transportation needs     Medical: Not on file     Non-medical: Not on file   Tobacco Use    Smoking status: Former Smoker    Smokeless tobacco: Never Used   Substance and Sexual Activity    Alcohol use: Not on file    Drug use: Not on file    Sexual activity: Not on file   Lifestyle    Physical activity     Days per week: Not on file     Minutes per session: Not on file    Stress: Not on file    Relationships    Social connections     Talks on phone: Not on file     Gets together: Not on file     Attends [employer] service: Not on file     Active member of club or organization: Not on file     Attends meetings of clubs or organizations: Not on file     Relationship status: Not on file   Other Topics Concern    Not on file   Social History Narrative    Not on file     Past Surgical History:   Procedure Laterality Date    CHOLECYSTECTOMY      moh's surgery  2019    basal cell ca     History reviewed. No pertinent family history.    Review of patient's allergies indicates:   Allergen Reactions    Penicillins     Sulfa (sulfonamide antibiotics)        Current Outpatient Medications:     amitriptyline (ELAVIL) 10 MG tablet, Take 2 tablets (20 mg total) by mouth nightly as needed for Insomnia., Disp: 180 tablet, Rfl: 1    atorvastatin (LIPITOR) 40 MG tablet, Take 1 tablet (40 mg total) by mouth once daily., Disp: 90 tablet, Rfl: 1    carvediloL (COREG) 3.125 MG tablet, Take 1 tablet (3.125 mg total) by mouth 2 (two) times daily., Disp: 180 tablet, Rfl: 1    dipyridamole-aspirin 200-25 mg (AGGRENOX)  mg CM12, TK 1 C PO BID, Disp: 180 capsule, Rfl: 1    famotidine (PEPCID) 20 MG tablet, Take 1 tablet (20 mg total) by mouth nightly as needed for Heartburn., Disp: 90 tablet, Rfl: 1    FLUoxetine 10 MG capsule, Take 1 capsule (10 mg total) by mouth once daily., Disp: 90 capsule, Rfl: 1    gabapentin (NEURONTIN) 100 MG capsule, 1-2 caps p.o. q.h.s. for neuropathy, Disp: 60 capsule, Rfl: 2    ondansetron (ZOFRAN) 4 MG tablet, Take 2 tablets (8 mg total) by mouth every 12 (twelve) hours as needed for Nausea. (Patient not taking: Reported on 10/1/2020), Disp: 60 tablet, Rfl: 0    pantoprazole (PROTONIX) 40 MG tablet, Take 1 tablet (40 mg total) by mouth once daily., Disp: 90 tablet, Rfl: 1    UNITHROID 75 mcg tablet, Take 1 tablet (75 mcg total) by mouth before breakfast., Disp: 90 tablet, Rfl:  "1    Review of Systems   Constitutional: Negative for appetite change, chills, fatigue, fever and unexpected weight change.   HENT: Negative for congestion, ear pain, sinus pain, sore throat and trouble swallowing.    Eyes: Negative for pain, discharge and visual disturbance.   Respiratory: Negative for apnea, cough, shortness of breath and wheezing.    Cardiovascular: Negative for chest pain, palpitations and leg swelling.   Gastrointestinal: Negative for abdominal pain, blood in stool, constipation, diarrhea, nausea and vomiting.   Endocrine: Negative for heat intolerance, polydipsia and polyuria.   Genitourinary: Negative for difficulty urinating, dyspareunia, dysuria, frequency, hematuria and menstrual problem.        Incontinent, uses  Depends    Musculoskeletal: Positive for arthralgias (She has right flank pains and back pain.) and gait problem ( wobbly unsteady gait and requires assistance Rollator). Negative for back pain, joint swelling and myalgias.   Allergic/Immunologic: Negative for environmental allergies, food allergies and immunocompromised state.   Neurological: Positive for weakness (left sided weakness arms & legs). Negative for dizziness, tremors, seizures, numbness and headaches.   Psychiatric/Behavioral: Negative for behavioral problems, confusion, hallucinations and suicidal ideas. The patient is not nervous/anxious.         Worsening cognitive function, worsening short-term memory          Objective:      Vitals:    11/18/20 0805   BP: 138/68   Pulse: 60   Weight: 67.6 kg (149 lb)   Height: 5' 2" (1.575 m)     Physical Exam  Vitals signs and nursing note reviewed.   Constitutional:       Appearance: She is well-developed.      Comments: Very pleasant elderly female   HENT:      Head: Normocephalic and atraumatic.      Right Ear: External ear normal.      Left Ear: External ear normal.      Nose: Nose normal.   Eyes:      Pupils: Pupils are equal, round, and reactive to light.   Neck:      " Musculoskeletal: Normal range of motion and neck supple.      Thyroid: No thyromegaly.      Vascular: No carotid bruit.   Cardiovascular:      Rate and Rhythm: Normal rate and regular rhythm.      Heart sounds: Normal heart sounds. No murmur.   Pulmonary:      Effort: Pulmonary effort is normal.      Breath sounds: Normal breath sounds. No wheezing or rales.   Abdominal:      General: Bowel sounds are normal. There is no distension.      Palpations: Abdomen is soft.      Tenderness: There is no abdominal tenderness.   Musculoskeletal: Normal range of motion.         General: No tenderness or deformity.      Lumbar back: Normal. She exhibits no pain and no spasm.      Comments: Bends 90 degrees at  waist   Lymphadenopathy:      Cervical: No cervical adenopathy.   Skin:     General: Skin is warm and dry.      Findings: No rash.      Comments: Jaw line and preauricular sebaceous cyst present   Neurological:      Mental Status: She is alert and oriented to person, place, and time.      Cranial Nerves: No cranial nerve deficit.      Motor: Weakness (Mild left vega paresis) present.      Coordination: Coordination normal.      Gait: Gait abnormal ( needs assistance with ambulation, uses Rollator).   Psychiatric:         Behavior: Behavior normal.         Thought Content: Thought content normal.         Judgment: Judgment normal.           Assessment:       1. Stroke of right basal ganglia    2. Screening mammogram for high-risk patient    3. Hyperlipidemia, unspecified hyperlipidemia type    4. Mild cognitive impairment    5. Anxiety disorder, unspecified type    6. Essential hypertension    7. Atherosclerotic heart disease of native coronary artery with other forms of angina pectoris    8. Menopausal state    9. Acquired hypothyroidism    10. Age-related osteoporosis without current pathological fracture    11. Gait disturbance         Plan:       Stroke of right basal ganglia  Patient is status post right basal ganglia  stroke.  Left-sided hemiparesis and balance problems continue.  Set up home health and physical therapy  Screening mammogram for high-risk patient  -     Mammo Digital Screening Bilat w/ [place]; Future; Expected date: 11/18/2020  -     CBC Auto Differential; Future; Expected date: 11/18/2020  -     Comprehensive Metabolic Panel; Future; Expected date: 11/18/2020  -     Lipid Panel; Future; Expected date: 11/18/2020  Mammogram ordered  Hyperlipidemia, unspecified hyperlipidemia type  -     CBC Auto Differential; Future; Expected date: 11/18/2020  -     Comprehensive Metabolic Panel; Future; Expected date: 11/18/2020  -     Lipid Panel; Future; Expected date: 11/18/2020  Labs due in January  Mild cognitive impairment  May be vascular dementia  Or the beginnings Alzheimer's  Anxiety disorder, unspecified type    Essential hypertension  Blood Pressure well controlled  Atherosclerotic heart disease of native coronary artery with other forms of angina pectoris  No angina lately  Menopausal state    Acquired hypothyroidism    Age-related osteoporosis without current pathological fracture    Gait disturbance  Continue Rollator needs physical therapy    Follow up in about 3 months (around 2/18/2021), or CVA.        11/18/2020 Vidal Kraus

## 2020-11-18 NOTE — TELEPHONE ENCOUNTER
----- Message from Yancy Jade sent at 11/18/2020 10:34 AM CST -----  Regarding: needing call back  [facility] isnt a provider for pt insurance please call back   [phone]

## 2020-12-08 ENCOUNTER — EXTERNAL HOME HEALTH (OUTPATIENT)
Dept: HOME HEALTH SERVICES | Facility: HOSPITAL | Age: 85
End: 2020-12-08

## 2020-12-18 ENCOUNTER — DOCUMENT SCAN (OUTPATIENT)
Dept: HOME HEALTH SERVICES | Facility: HOSPITAL | Age: 85
End: 2020-12-18

## 2021-01-05 ENCOUNTER — TELEPHONE (OUTPATIENT)
Dept: FAMILY MEDICINE | Facility: CLINIC | Age: 86
End: 2021-01-05

## 2021-01-05 DIAGNOSIS — K21.9 GASTROESOPHAGEAL REFLUX DISEASE WITHOUT ESOPHAGITIS: ICD-10-CM

## 2021-01-05 DIAGNOSIS — Z79.899 ENCOUNTER FOR LONG-TERM (CURRENT) USE OF OTHER MEDICATIONS: Primary | ICD-10-CM

## 2021-01-05 DIAGNOSIS — E78.5 HYPERLIPIDEMIA, UNSPECIFIED HYPERLIPIDEMIA TYPE: ICD-10-CM

## 2021-01-05 DIAGNOSIS — I10 ESSENTIAL HYPERTENSION: ICD-10-CM

## 2021-01-05 DIAGNOSIS — E03.9 ACQUIRED HYPOTHYROIDISM: ICD-10-CM

## 2021-01-05 RX ORDER — FAMOTIDINE 20 MG/1
20 TABLET, FILM COATED ORAL NIGHTLY PRN
Qty: 90 TABLET | Refills: 1 | Status: SHIPPED | OUTPATIENT
Start: 2021-01-05 | End: 2021-06-25 | Stop reason: SDUPTHER

## 2021-01-07 ENCOUNTER — TELEPHONE (OUTPATIENT)
Dept: FAMILY MEDICINE | Facility: CLINIC | Age: 86
End: 2021-01-07

## 2021-01-07 ENCOUNTER — OFFICE VISIT (OUTPATIENT)
Dept: PODIATRY | Facility: CLINIC | Age: 86
End: 2021-01-07
Payer: MEDICARE

## 2021-01-07 VITALS — HEIGHT: 62 IN | WEIGHT: 150 LBS | BODY MASS INDEX: 27.6 KG/M2 | RESPIRATION RATE: 16 BRPM | HEART RATE: 62 BPM

## 2021-01-07 DIAGNOSIS — L60.0 INGROWN NAIL: Primary | ICD-10-CM

## 2021-01-07 DIAGNOSIS — M79.674 GREAT TOE PAIN, RIGHT: ICD-10-CM

## 2021-01-07 PROCEDURE — 99203 PR OFFICE/OUTPT VISIT, NEW, LEVL III, 30-44 MIN: ICD-10-PCS | Mod: 25,S$GLB,, | Performed by: PODIATRIST

## 2021-01-07 PROCEDURE — 99203 OFFICE O/P NEW LOW 30 MIN: CPT | Mod: 25,S$GLB,, | Performed by: PODIATRIST

## 2021-01-07 PROCEDURE — 11730 NAIL REMOVAL: ICD-10-PCS | Mod: T5,S$GLB,, | Performed by: PODIATRIST

## 2021-01-07 PROCEDURE — 11730 AVULSION NAIL PLATE SIMPLE 1: CPT | Mod: T5,S$GLB,, | Performed by: PODIATRIST

## 2021-01-09 ENCOUNTER — DOCUMENT SCAN (OUTPATIENT)
Dept: HOME HEALTH SERVICES | Facility: HOSPITAL | Age: 86
End: 2021-01-09

## 2021-01-13 LAB
ALBUMIN SERPL-MCNC: 3.9 G/DL (ref 3.6–5.1)
ALBUMIN/GLOB SERPL: 1.6 (CALC) (ref 1–2.5)
ALP SERPL-CCNC: 95 U/L (ref 37–153)
ALT SERPL-CCNC: 15 U/L (ref 6–29)
AST SERPL-CCNC: 15 U/L (ref 10–35)
BASOPHILS # BLD AUTO: 58 CELLS/UL (ref 0–200)
BASOPHILS NFR BLD AUTO: 1 %
BILIRUB SERPL-MCNC: 0.6 MG/DL (ref 0.2–1.2)
BUN SERPL-MCNC: 13 MG/DL (ref 7–25)
BUN/CREAT SERPL: ABNORMAL (CALC) (ref 6–22)
CALCIUM SERPL-MCNC: 8.4 MG/DL (ref 8.6–10.4)
CHLORIDE SERPL-SCNC: 105 MMOL/L (ref 98–110)
CHOLEST SERPL-MCNC: 131 MG/DL
CHOLEST/HDLC SERPL: 3 (CALC)
CO2 SERPL-SCNC: 25 MMOL/L (ref 20–32)
CREAT SERPL-MCNC: 0.78 MG/DL (ref 0.6–0.88)
EOSINOPHIL # BLD AUTO: 157 CELLS/UL (ref 15–500)
EOSINOPHIL NFR BLD AUTO: 2.7 %
ERYTHROCYTE [DISTWIDTH] IN BLOOD BY AUTOMATED COUNT: 12.8 % (ref 11–15)
GFRSERPLBLD MDRD-ARVRAT: 68 ML/MIN/1.73M2
GLOBULIN SER CALC-MCNC: 2.4 G/DL (CALC) (ref 1.9–3.7)
GLUCOSE SERPL-MCNC: 99 MG/DL (ref 65–99)
HCT VFR BLD AUTO: 37.4 % (ref 35–45)
HDLC SERPL-MCNC: 43 MG/DL
HGB BLD-MCNC: 12.7 G/DL (ref 11.7–15.5)
LDLC SERPL CALC-MCNC: 66 MG/DL (CALC)
LYMPHOCYTES # BLD AUTO: 1670 CELLS/UL (ref 850–3900)
LYMPHOCYTES NFR BLD AUTO: 28.8 %
MCH RBC QN AUTO: 30.5 PG (ref 27–33)
MCHC RBC AUTO-ENTMCNC: 34 G/DL (ref 32–36)
MCV RBC AUTO: 89.7 FL (ref 80–100)
MONOCYTES # BLD AUTO: 394 CELLS/UL (ref 200–950)
MONOCYTES NFR BLD AUTO: 6.8 %
NEUTROPHILS # BLD AUTO: 3521 CELLS/UL (ref 1500–7800)
NEUTROPHILS NFR BLD AUTO: 60.7 %
NONHDLC SERPL-MCNC: 88 MG/DL (CALC)
PLATELET # BLD AUTO: 212 THOUSAND/UL (ref 140–400)
PMV BLD REES-ECKER: 10.2 FL (ref 7.5–12.5)
POTASSIUM SERPL-SCNC: 4.4 MMOL/L (ref 3.5–5.3)
PROT SERPL-MCNC: 6.3 G/DL (ref 6.1–8.1)
RBC # BLD AUTO: 4.17 MILLION/UL (ref 3.8–5.1)
SODIUM SERPL-SCNC: 140 MMOL/L (ref 135–146)
TRIGL SERPL-MCNC: 141 MG/DL
TSH SERPL-ACNC: 1.51 MIU/L (ref 0.4–4.5)
WBC # BLD AUTO: 5.8 THOUSAND/UL (ref 3.8–10.8)

## 2021-01-18 ENCOUNTER — OFFICE VISIT (OUTPATIENT)
Dept: FAMILY MEDICINE | Facility: CLINIC | Age: 86
End: 2021-01-18
Payer: MEDICARE

## 2021-01-18 VITALS
DIASTOLIC BLOOD PRESSURE: 78 MMHG | SYSTOLIC BLOOD PRESSURE: 110 MMHG | BODY MASS INDEX: 28.16 KG/M2 | HEART RATE: 76 BPM | WEIGHT: 153 LBS | HEIGHT: 62 IN

## 2021-01-18 DIAGNOSIS — G31.84 MILD COGNITIVE IMPAIRMENT: ICD-10-CM

## 2021-01-18 DIAGNOSIS — I10 ESSENTIAL HYPERTENSION: ICD-10-CM

## 2021-01-18 DIAGNOSIS — I25.118 ATHEROSCLEROTIC HEART DISEASE OF NATIVE CORONARY ARTERY WITH OTHER FORMS OF ANGINA PECTORIS: ICD-10-CM

## 2021-01-18 DIAGNOSIS — E78.5 HYPERLIPIDEMIA, UNSPECIFIED HYPERLIPIDEMIA TYPE: ICD-10-CM

## 2021-01-18 DIAGNOSIS — G62.9 NEUROPATHY: ICD-10-CM

## 2021-01-18 DIAGNOSIS — N95.1 MENOPAUSAL STATE: ICD-10-CM

## 2021-01-18 DIAGNOSIS — F41.9 ANXIETY DISORDER, UNSPECIFIED TYPE: ICD-10-CM

## 2021-01-18 DIAGNOSIS — M81.0 AGE-RELATED OSTEOPOROSIS WITHOUT CURRENT PATHOLOGICAL FRACTURE: ICD-10-CM

## 2021-01-18 DIAGNOSIS — I63.81 STROKE OF RIGHT BASAL GANGLIA: Primary | ICD-10-CM

## 2021-01-18 DIAGNOSIS — E03.9 HYPOTHYROIDISM, UNSPECIFIED TYPE: ICD-10-CM

## 2021-01-18 DIAGNOSIS — K21.9 GASTROESOPHAGEAL REFLUX DISEASE WITHOUT ESOPHAGITIS: ICD-10-CM

## 2021-01-18 PROCEDURE — 99214 OFFICE O/P EST MOD 30 MIN: CPT | Mod: S$GLB,,, | Performed by: FAMILY MEDICINE

## 2021-01-18 PROCEDURE — 99214 PR OFFICE/OUTPT VISIT, EST, LEVL IV, 30-39 MIN: ICD-10-PCS | Mod: S$GLB,,, | Performed by: FAMILY MEDICINE

## 2021-01-18 RX ORDER — FLUOXETINE 10 MG/1
10 CAPSULE ORAL DAILY
Qty: 90 CAPSULE | Refills: 1 | Status: SHIPPED | OUTPATIENT
Start: 2021-01-18 | End: 2021-04-27 | Stop reason: SDUPTHER

## 2021-01-18 RX ORDER — ASPIRIN AND DIPYRIDAMOLE 25; 200 MG/1; MG/1
CAPSULE, EXTENDED RELEASE ORAL
Qty: 180 CAPSULE | Refills: 1 | Status: SHIPPED | OUTPATIENT
Start: 2021-01-18 | End: 2021-09-09 | Stop reason: SDUPTHER

## 2021-01-18 RX ORDER — AMITRIPTYLINE HYDROCHLORIDE 10 MG/1
20 TABLET, FILM COATED ORAL NIGHTLY PRN
Qty: 180 TABLET | Refills: 1 | Status: SHIPPED | OUTPATIENT
Start: 2021-01-18 | End: 2022-01-29 | Stop reason: SDUPTHER

## 2021-01-18 RX ORDER — LEVOTHYROXINE SODIUM 75 UG/1
75 TABLET ORAL
Qty: 90 TABLET | Refills: 1 | Status: SHIPPED | OUTPATIENT
Start: 2021-01-18 | End: 2021-11-29 | Stop reason: SDUPTHER

## 2021-01-18 RX ORDER — PANTOPRAZOLE SODIUM 40 MG/1
40 TABLET, DELAYED RELEASE ORAL DAILY
Qty: 90 TABLET | Refills: 1 | Status: SHIPPED | OUTPATIENT
Start: 2021-01-18 | End: 2021-04-27 | Stop reason: SDUPTHER

## 2021-01-18 RX ORDER — ATORVASTATIN CALCIUM 40 MG/1
40 TABLET, FILM COATED ORAL DAILY
Qty: 90 TABLET | Refills: 1 | Status: SHIPPED | OUTPATIENT
Start: 2021-01-18 | End: 2022-03-14 | Stop reason: SDUPTHER

## 2021-01-18 RX ORDER — CARVEDILOL 3.12 MG/1
3.12 TABLET ORAL 2 TIMES DAILY
Qty: 180 TABLET | Refills: 1 | Status: SHIPPED | OUTPATIENT
Start: 2021-01-18 | End: 2021-11-29 | Stop reason: SDUPTHER

## 2021-01-18 RX ORDER — ACETAMINOPHEN 500 MG
1 TABLET ORAL DAILY
COMMUNITY

## 2021-01-18 RX ORDER — ACETAMINOPHEN, DEXTROMETHORPHAN HBR, DOXYLAMINE SUCCINATE, PHENYLEPHRINE HCL 650; 20; 12.5; 1 MG/30ML; MG/30ML; MG/30ML; MG/30ML
1 SOLUTION ORAL DAILY
COMMUNITY

## 2021-01-20 ENCOUNTER — TELEPHONE (OUTPATIENT)
Dept: FAMILY MEDICINE | Facility: CLINIC | Age: 86
End: 2021-01-20

## 2021-01-20 LAB
ALBUMIN/CREAT UR: 5 MCG/MG CREAT
APPEARANCE UR: CLEAR
BACTERIA #/AREA URNS HPF: ABNORMAL /HPF
BACTERIA UR CULT: NORMAL
BILIRUB UR QL STRIP: NEGATIVE
COLOR UR: YELLOW
CREAT UR-MCNC: 75 MG/DL (ref 20–275)
GLUCOSE UR QL STRIP: NEGATIVE
HGB UR QL STRIP: NEGATIVE
HYALINE CASTS #/AREA URNS LPF: ABNORMAL /LPF
KETONES UR QL STRIP: NEGATIVE
LEUKOCYTE ESTERASE UR QL STRIP: ABNORMAL
MICROALBUMIN UR-MCNC: 0.4 MG/DL
NITRITE UR QL STRIP: NEGATIVE
PH UR STRIP: 5.5 [PH] (ref 5–8)
PROT UR QL STRIP: NEGATIVE
RBC #/AREA URNS HPF: ABNORMAL /HPF
SP GR UR STRIP: 1.01 (ref 1–1.03)
SQUAMOUS #/AREA URNS HPF: ABNORMAL /HPF
WBC #/AREA URNS HPF: ABNORMAL /HPF

## 2021-02-04 ENCOUNTER — OFFICE VISIT (OUTPATIENT)
Dept: CARDIOLOGY | Facility: CLINIC | Age: 86
End: 2021-02-04
Payer: MEDICARE

## 2021-02-04 VITALS
RESPIRATION RATE: 18 BRPM | BODY MASS INDEX: 27.6 KG/M2 | SYSTOLIC BLOOD PRESSURE: 118 MMHG | DIASTOLIC BLOOD PRESSURE: 74 MMHG | HEART RATE: 68 BPM | HEIGHT: 62 IN | OXYGEN SATURATION: 98 % | WEIGHT: 150 LBS

## 2021-02-04 DIAGNOSIS — I63.81 STROKE OF RIGHT BASAL GANGLIA: ICD-10-CM

## 2021-02-04 DIAGNOSIS — E78.2 MIXED HYPERLIPIDEMIA: ICD-10-CM

## 2021-02-04 DIAGNOSIS — I25.118 ATHEROSCLEROTIC HEART DISEASE OF NATIVE CORONARY ARTERY WITH OTHER FORMS OF ANGINA PECTORIS: ICD-10-CM

## 2021-02-04 DIAGNOSIS — G31.84 MILD COGNITIVE IMPAIRMENT: ICD-10-CM

## 2021-02-04 DIAGNOSIS — I10 ESSENTIAL HYPERTENSION: Primary | ICD-10-CM

## 2021-02-04 PROCEDURE — 93000 ELECTROCARDIOGRAM COMPLETE: CPT | Mod: S$GLB,,, | Performed by: SPECIALIST

## 2021-02-04 PROCEDURE — 93000 EKG 12-LEAD: ICD-10-PCS | Mod: S$GLB,,, | Performed by: SPECIALIST

## 2021-02-04 PROCEDURE — 99213 PR OFFICE/OUTPT VISIT, EST, LEVL III, 20-29 MIN: ICD-10-PCS | Mod: S$GLB,,, | Performed by: INTERNAL MEDICINE

## 2021-02-04 PROCEDURE — 99213 OFFICE O/P EST LOW 20 MIN: CPT | Mod: S$GLB,,, | Performed by: INTERNAL MEDICINE

## 2021-02-04 RX ORDER — NITROGLYCERIN 0.3 MG/1
0.3 TABLET SUBLINGUAL EVERY 5 MIN PRN
COMMUNITY

## 2021-02-09 ENCOUNTER — LAB VISIT (OUTPATIENT)
Dept: LAB | Facility: HOSPITAL | Age: 86
End: 2021-02-09
Attending: INTERNAL MEDICINE
Payer: MEDICARE

## 2021-02-09 DIAGNOSIS — M81.0 SENILE OSTEOPOROSIS: Primary | ICD-10-CM

## 2021-02-09 DIAGNOSIS — Z79.899 ENCOUNTER FOR LONG-TERM (CURRENT) USE OF OTHER MEDICATIONS: ICD-10-CM

## 2021-02-09 LAB — CALCIUM SERPL-MCNC: 8.8 MG/DL (ref 8.7–10.5)

## 2021-02-09 PROCEDURE — 82310 ASSAY OF CALCIUM: CPT

## 2021-02-09 PROCEDURE — 36415 COLL VENOUS BLD VENIPUNCTURE: CPT

## 2021-02-23 ENCOUNTER — INFUSION (OUTPATIENT)
Dept: INFUSION THERAPY | Facility: HOSPITAL | Age: 86
End: 2021-02-23
Attending: INTERNAL MEDICINE
Payer: MEDICARE

## 2021-02-23 VITALS
HEART RATE: 76 BPM | RESPIRATION RATE: 18 BRPM | SYSTOLIC BLOOD PRESSURE: 148 MMHG | TEMPERATURE: 98 F | OXYGEN SATURATION: 97 % | DIASTOLIC BLOOD PRESSURE: 74 MMHG | BODY MASS INDEX: 27.69 KG/M2 | WEIGHT: 151.38 LBS

## 2021-02-23 DIAGNOSIS — M81.0 AGE-RELATED OSTEOPOROSIS WITHOUT CURRENT PATHOLOGICAL FRACTURE: Primary | ICD-10-CM

## 2021-02-23 PROCEDURE — 96372 THER/PROPH/DIAG INJ SC/IM: CPT

## 2021-02-23 PROCEDURE — 63600175 PHARM REV CODE 636 W HCPCS: Mod: JG | Performed by: INTERNAL MEDICINE

## 2021-02-23 RX ADMIN — DENOSUMAB 60 MG: 60 INJECTION SUBCUTANEOUS at 11:02

## 2021-03-31 ENCOUNTER — PATIENT MESSAGE (OUTPATIENT)
Dept: CARDIOLOGY | Facility: CLINIC | Age: 86
End: 2021-03-31

## 2021-04-27 DIAGNOSIS — K21.9 GASTROESOPHAGEAL REFLUX DISEASE WITHOUT ESOPHAGITIS: ICD-10-CM

## 2021-04-27 DIAGNOSIS — F41.9 ANXIETY DISORDER, UNSPECIFIED TYPE: ICD-10-CM

## 2021-04-27 RX ORDER — PANTOPRAZOLE SODIUM 40 MG/1
40 TABLET, DELAYED RELEASE ORAL DAILY
Qty: 90 TABLET | Refills: 1 | Status: SHIPPED | OUTPATIENT
Start: 2021-04-27 | End: 2021-08-28 | Stop reason: SDUPTHER

## 2021-04-27 RX ORDER — FLUOXETINE 10 MG/1
10 CAPSULE ORAL DAILY
Qty: 90 CAPSULE | Refills: 1 | Status: SHIPPED | OUTPATIENT
Start: 2021-04-27 | End: 2021-09-23 | Stop reason: SDUPTHER

## 2021-06-25 DIAGNOSIS — K21.9 GASTROESOPHAGEAL REFLUX DISEASE WITHOUT ESOPHAGITIS: ICD-10-CM

## 2021-06-25 RX ORDER — FAMOTIDINE 20 MG/1
20 TABLET, FILM COATED ORAL NIGHTLY PRN
Qty: 90 TABLET | Refills: 1 | Status: SHIPPED | OUTPATIENT
Start: 2021-06-25 | End: 2021-09-23 | Stop reason: SDUPTHER

## 2021-07-19 ENCOUNTER — OFFICE VISIT (OUTPATIENT)
Dept: FAMILY MEDICINE | Facility: CLINIC | Age: 86
End: 2021-07-19
Payer: MEDICARE

## 2021-07-19 VITALS
WEIGHT: 149 LBS | HEIGHT: 62 IN | BODY MASS INDEX: 27.42 KG/M2 | SYSTOLIC BLOOD PRESSURE: 110 MMHG | DIASTOLIC BLOOD PRESSURE: 72 MMHG | HEART RATE: 76 BPM

## 2021-07-19 DIAGNOSIS — F01.50 VASCULAR DEMENTIA WITHOUT BEHAVIORAL DISTURBANCE: Primary | ICD-10-CM

## 2021-07-19 DIAGNOSIS — I63.81 STROKE OF RIGHT BASAL GANGLIA: ICD-10-CM

## 2021-07-19 DIAGNOSIS — I10 ESSENTIAL HYPERTENSION: ICD-10-CM

## 2021-07-19 DIAGNOSIS — E03.9 ACQUIRED HYPOTHYROIDISM: ICD-10-CM

## 2021-07-19 DIAGNOSIS — I25.118 ATHEROSCLEROTIC HEART DISEASE OF NATIVE CORONARY ARTERY WITH OTHER FORMS OF ANGINA PECTORIS: ICD-10-CM

## 2021-07-19 DIAGNOSIS — M81.0 AGE-RELATED OSTEOPOROSIS WITHOUT CURRENT PATHOLOGICAL FRACTURE: ICD-10-CM

## 2021-07-19 DIAGNOSIS — E78.5 HYPERLIPIDEMIA, UNSPECIFIED HYPERLIPIDEMIA TYPE: ICD-10-CM

## 2021-07-19 DIAGNOSIS — R26.9 GAIT DISTURBANCE: ICD-10-CM

## 2021-07-19 PROCEDURE — 99214 PR OFFICE/OUTPT VISIT, EST, LEVL IV, 30-39 MIN: ICD-10-PCS | Mod: S$GLB,,, | Performed by: FAMILY MEDICINE

## 2021-07-19 PROCEDURE — 99214 OFFICE O/P EST MOD 30 MIN: CPT | Mod: S$GLB,,, | Performed by: FAMILY MEDICINE

## 2021-07-19 RX ORDER — DONEPEZIL HYDROCHLORIDE 5 MG/1
5 TABLET, FILM COATED ORAL NIGHTLY
Qty: 30 TABLET | Refills: 5 | Status: SHIPPED | OUTPATIENT
Start: 2021-07-19 | End: 2021-09-23 | Stop reason: SDUPTHER

## 2021-07-20 DIAGNOSIS — M81.0 SENILE OSTEOPOROSIS: Primary | ICD-10-CM

## 2021-07-20 DIAGNOSIS — E04.2 NONTOXIC MULTINODULAR GOITER: ICD-10-CM

## 2021-08-01 DIAGNOSIS — F41.9 ANXIETY DISORDER, UNSPECIFIED TYPE: ICD-10-CM

## 2021-08-01 RX ORDER — FLUOXETINE 10 MG/1
10 CAPSULE ORAL DAILY
Qty: 90 CAPSULE | Refills: 1 | Status: CANCELLED | OUTPATIENT
Start: 2021-08-01

## 2021-08-09 ENCOUNTER — HOSPITAL ENCOUNTER (OUTPATIENT)
Dept: RADIOLOGY | Facility: HOSPITAL | Age: 86
Discharge: HOME | End: 2021-08-09
Attending: INTERNAL MEDICINE
Payer: MEDICARE

## 2021-08-09 DIAGNOSIS — M81.0 SENILE OSTEOPOROSIS: ICD-10-CM

## 2021-08-09 DIAGNOSIS — E04.2 NONTOXIC MULTINODULAR GOITER: ICD-10-CM

## 2021-08-09 PROCEDURE — 76536 US EXAM OF HEAD AND NECK: CPT | Mod: TC,PO

## 2021-08-09 PROCEDURE — 77080 DXA BONE DENSITY AXIAL: CPT | Mod: TC,PO

## 2021-08-10 ENCOUNTER — TELEPHONE (OUTPATIENT)
Dept: FAMILY MEDICINE | Facility: CLINIC | Age: 86
End: 2021-08-10

## 2021-08-28 DIAGNOSIS — K21.9 GASTROESOPHAGEAL REFLUX DISEASE WITHOUT ESOPHAGITIS: ICD-10-CM

## 2021-09-01 RX ORDER — PANTOPRAZOLE SODIUM 40 MG/1
40 TABLET, DELAYED RELEASE ORAL DAILY
Qty: 90 TABLET | Refills: 1 | Status: SHIPPED | OUTPATIENT
Start: 2021-09-01 | End: 2021-09-14 | Stop reason: SDUPTHER

## 2021-09-09 DIAGNOSIS — I63.81 STROKE OF RIGHT BASAL GANGLIA: ICD-10-CM

## 2021-09-09 RX ORDER — ASPIRIN AND DIPYRIDAMOLE 25; 200 MG/1; MG/1
CAPSULE, EXTENDED RELEASE ORAL
Qty: 180 CAPSULE | Refills: 1 | Status: SHIPPED | OUTPATIENT
Start: 2021-09-09 | End: 2021-09-14 | Stop reason: SDUPTHER

## 2021-09-14 ENCOUNTER — PATIENT MESSAGE (OUTPATIENT)
Dept: FAMILY MEDICINE | Facility: CLINIC | Age: 86
End: 2021-09-14

## 2021-09-14 DIAGNOSIS — I63.81 STROKE OF RIGHT BASAL GANGLIA: ICD-10-CM

## 2021-09-14 DIAGNOSIS — K21.9 GASTROESOPHAGEAL REFLUX DISEASE WITHOUT ESOPHAGITIS: ICD-10-CM

## 2021-09-14 RX ORDER — ASPIRIN AND DIPYRIDAMOLE 25; 200 MG/1; MG/1
CAPSULE, EXTENDED RELEASE ORAL
Qty: 180 CAPSULE | Refills: 1 | Status: SHIPPED | OUTPATIENT
Start: 2021-09-14 | End: 2022-03-14 | Stop reason: SDUPTHER

## 2021-09-14 RX ORDER — PANTOPRAZOLE SODIUM 40 MG/1
40 TABLET, DELAYED RELEASE ORAL DAILY
Qty: 90 TABLET | Refills: 1 | Status: CANCELLED | OUTPATIENT
Start: 2021-09-14

## 2021-09-14 RX ORDER — PANTOPRAZOLE SODIUM 40 MG/1
40 TABLET, DELAYED RELEASE ORAL DAILY
Qty: 90 TABLET | Refills: 1 | Status: SHIPPED | OUTPATIENT
Start: 2021-09-14 | End: 2022-03-14 | Stop reason: SDUPTHER

## 2021-09-14 RX ORDER — ASPIRIN AND DIPYRIDAMOLE 25; 200 MG/1; MG/1
CAPSULE, EXTENDED RELEASE ORAL
Qty: 180 CAPSULE | Refills: 1 | Status: CANCELLED | OUTPATIENT
Start: 2021-09-14

## 2021-09-23 DIAGNOSIS — K21.9 GASTROESOPHAGEAL REFLUX DISEASE WITHOUT ESOPHAGITIS: ICD-10-CM

## 2021-09-23 DIAGNOSIS — F41.9 ANXIETY DISORDER, UNSPECIFIED TYPE: ICD-10-CM

## 2021-09-23 DIAGNOSIS — E78.5 HYPERLIPIDEMIA, UNSPECIFIED HYPERLIPIDEMIA TYPE: ICD-10-CM

## 2021-09-23 RX ORDER — FLUOXETINE 10 MG/1
10 CAPSULE ORAL DAILY
Qty: 90 CAPSULE | Refills: 1 | Status: SHIPPED | OUTPATIENT
Start: 2021-09-23 | End: 2021-10-07 | Stop reason: SDUPTHER

## 2021-09-23 RX ORDER — FAMOTIDINE 20 MG/1
20 TABLET, FILM COATED ORAL NIGHTLY PRN
Qty: 90 TABLET | Refills: 1 | Status: SHIPPED | OUTPATIENT
Start: 2021-09-23 | End: 2022-03-14 | Stop reason: SDUPTHER

## 2021-09-23 RX ORDER — DONEPEZIL HYDROCHLORIDE 5 MG/1
5 TABLET, FILM COATED ORAL NIGHTLY
Qty: 30 TABLET | Refills: 5 | Status: SHIPPED | OUTPATIENT
Start: 2021-09-23 | End: 2022-03-14 | Stop reason: SDUPTHER

## 2021-09-30 ENCOUNTER — LAB VISIT (OUTPATIENT)
Dept: LAB | Facility: HOSPITAL | Age: 86
End: 2021-09-30
Attending: INTERNAL MEDICINE
Payer: MEDICARE

## 2021-09-30 DIAGNOSIS — Z79.899 ENCOUNTER FOR LONG-TERM (CURRENT) USE OF OTHER MEDICATIONS: Primary | ICD-10-CM

## 2021-09-30 DIAGNOSIS — M81.0 SENILE OSTEOPOROSIS: ICD-10-CM

## 2021-09-30 LAB — CALCIUM SERPL-MCNC: 9 MG/DL (ref 8.7–10.5)

## 2021-09-30 PROCEDURE — 82310 ASSAY OF CALCIUM: CPT | Performed by: INTERNAL MEDICINE

## 2021-09-30 PROCEDURE — 36415 COLL VENOUS BLD VENIPUNCTURE: CPT | Performed by: INTERNAL MEDICINE

## 2021-10-07 DIAGNOSIS — F41.9 ANXIETY DISORDER, UNSPECIFIED TYPE: ICD-10-CM

## 2021-10-08 RX ORDER — FLUOXETINE 10 MG/1
10 CAPSULE ORAL DAILY
Qty: 90 CAPSULE | Refills: 1 | Status: SHIPPED | OUTPATIENT
Start: 2021-10-08 | End: 2022-03-14 | Stop reason: SDUPTHER

## 2021-10-11 ENCOUNTER — INFUSION (OUTPATIENT)
Dept: INFUSION THERAPY | Facility: HOSPITAL | Age: 86
End: 2021-10-11
Attending: INTERNAL MEDICINE
Payer: MEDICARE

## 2021-10-11 VITALS
WEIGHT: 150.38 LBS | HEIGHT: 62 IN | DIASTOLIC BLOOD PRESSURE: 69 MMHG | SYSTOLIC BLOOD PRESSURE: 122 MMHG | TEMPERATURE: 98 F | BODY MASS INDEX: 27.67 KG/M2 | HEART RATE: 71 BPM | RESPIRATION RATE: 16 BRPM

## 2021-10-11 DIAGNOSIS — M81.0 AGE-RELATED OSTEOPOROSIS WITHOUT CURRENT PATHOLOGICAL FRACTURE: Primary | ICD-10-CM

## 2021-10-11 PROCEDURE — 96372 THER/PROPH/DIAG INJ SC/IM: CPT

## 2021-10-11 PROCEDURE — 63600175 PHARM REV CODE 636 W HCPCS: Mod: JG | Performed by: INTERNAL MEDICINE

## 2021-10-11 RX ADMIN — DENOSUMAB 60 MG: 60 INJECTION SUBCUTANEOUS at 02:10

## 2021-11-15 ENCOUNTER — OFFICE VISIT (OUTPATIENT)
Dept: CARDIOLOGY | Facility: CLINIC | Age: 86
End: 2021-11-15
Payer: MEDICARE

## 2021-11-15 VITALS
RESPIRATION RATE: 16 BRPM | BODY MASS INDEX: 27.38 KG/M2 | OXYGEN SATURATION: 91 % | SYSTOLIC BLOOD PRESSURE: 100 MMHG | HEART RATE: 67 BPM | DIASTOLIC BLOOD PRESSURE: 59 MMHG | WEIGHT: 148.81 LBS | HEIGHT: 62 IN

## 2021-11-15 DIAGNOSIS — R26.9 GAIT DISTURBANCE: ICD-10-CM

## 2021-11-15 DIAGNOSIS — I25.118 ATHEROSCLEROTIC HEART DISEASE OF NATIVE CORONARY ARTERY WITH OTHER FORMS OF ANGINA PECTORIS: Primary | ICD-10-CM

## 2021-11-15 DIAGNOSIS — I63.81 STROKE OF RIGHT BASAL GANGLIA: ICD-10-CM

## 2021-11-15 DIAGNOSIS — I10 ESSENTIAL HYPERTENSION: ICD-10-CM

## 2021-11-15 DIAGNOSIS — E78.2 MIXED HYPERLIPIDEMIA: ICD-10-CM

## 2021-11-15 PROCEDURE — 99214 PR OFFICE/OUTPT VISIT, EST, LEVL IV, 30-39 MIN: ICD-10-PCS | Mod: S$GLB,,, | Performed by: INTERNAL MEDICINE

## 2021-11-15 PROCEDURE — 99214 OFFICE O/P EST MOD 30 MIN: CPT | Mod: S$GLB,,, | Performed by: INTERNAL MEDICINE

## 2021-11-17 ENCOUNTER — TELEPHONE (OUTPATIENT)
Dept: CARDIOLOGY | Facility: CLINIC | Age: 86
End: 2021-11-17
Payer: MEDICARE

## 2021-11-29 DIAGNOSIS — I25.118 ATHEROSCLEROTIC HEART DISEASE OF NATIVE CORONARY ARTERY WITH OTHER FORMS OF ANGINA PECTORIS: ICD-10-CM

## 2021-11-29 DIAGNOSIS — E03.9 HYPOTHYROIDISM, UNSPECIFIED TYPE: ICD-10-CM

## 2021-11-29 DIAGNOSIS — K21.9 GASTROESOPHAGEAL REFLUX DISEASE WITHOUT ESOPHAGITIS: ICD-10-CM

## 2021-11-29 DIAGNOSIS — I63.81 STROKE OF RIGHT BASAL GANGLIA: ICD-10-CM

## 2021-11-29 RX ORDER — ASPIRIN AND DIPYRIDAMOLE 25; 200 MG/1; MG/1
CAPSULE, EXTENDED RELEASE ORAL
Qty: 180 CAPSULE | Refills: 1 | Status: CANCELLED | OUTPATIENT
Start: 2021-11-29

## 2021-11-29 RX ORDER — PANTOPRAZOLE SODIUM 40 MG/1
40 TABLET, DELAYED RELEASE ORAL DAILY
Qty: 90 TABLET | Refills: 1 | Status: CANCELLED | OUTPATIENT
Start: 2021-11-29

## 2021-11-30 RX ORDER — LEVOTHYROXINE SODIUM 75 UG/1
75 TABLET ORAL
Qty: 90 TABLET | Refills: 1 | Status: SHIPPED | OUTPATIENT
Start: 2021-11-30 | End: 2022-03-14 | Stop reason: SDUPTHER

## 2021-11-30 RX ORDER — CARVEDILOL 3.12 MG/1
3.12 TABLET ORAL 2 TIMES DAILY
Qty: 180 TABLET | Refills: 1 | Status: SHIPPED | OUTPATIENT
Start: 2021-11-30 | End: 2022-03-14 | Stop reason: SDUPTHER

## 2021-12-02 ENCOUNTER — HOSPITAL ENCOUNTER (OUTPATIENT)
Dept: CARDIOLOGY | Facility: CLINIC | Age: 86
Discharge: HOME | End: 2021-12-02
Attending: INTERNAL MEDICINE
Payer: MEDICARE

## 2021-12-02 VITALS — HEIGHT: 62 IN | BODY MASS INDEX: 27.38 KG/M2 | WEIGHT: 148.81 LBS

## 2021-12-02 VITALS — HEIGHT: 62 IN | WEIGHT: 148.81 LBS | BODY MASS INDEX: 27.38 KG/M2

## 2021-12-02 DIAGNOSIS — R26.9 GAIT DISTURBANCE: ICD-10-CM

## 2021-12-02 DIAGNOSIS — I63.81 STROKE OF RIGHT BASAL GANGLIA: ICD-10-CM

## 2021-12-02 DIAGNOSIS — E78.2 MIXED HYPERLIPIDEMIA: ICD-10-CM

## 2021-12-02 DIAGNOSIS — I25.118 ATHEROSCLEROTIC HEART DISEASE OF NATIVE CORONARY ARTERY WITH OTHER FORMS OF ANGINA PECTORIS: ICD-10-CM

## 2021-12-02 DIAGNOSIS — I10 ESSENTIAL HYPERTENSION: ICD-10-CM

## 2021-12-02 PROCEDURE — 93306 TTE W/DOPPLER COMPLETE: CPT | Mod: S$GLB,,, | Performed by: INTERNAL MEDICINE

## 2021-12-02 PROCEDURE — 93880 CV US DOPPLER CAROTID (CUPID ONLY): ICD-10-PCS | Mod: S$GLB,,, | Performed by: INTERNAL MEDICINE

## 2021-12-02 PROCEDURE — 93880 EXTRACRANIAL BILAT STUDY: CPT | Mod: S$GLB,,, | Performed by: INTERNAL MEDICINE

## 2021-12-02 PROCEDURE — 93306 ECHO (CUPID ONLY): ICD-10-PCS | Mod: S$GLB,,, | Performed by: INTERNAL MEDICINE

## 2021-12-03 LAB
BSA FOR ECHO PROCEDURE: 1.72 M2
EJECTION FRACTION: 65 %
LEFT CCA DIST DIAS: 20 CM/S
LEFT CCA DIST SYS: 90 CM/S
LEFT CCA MID DIAS: 18 CM/S
LEFT CCA MID SYS: 86 CM/S
LEFT CCA PROX DIAS: 14 CM/S
LEFT CCA PROX SYS: 78 CM/S
LEFT ECA DIAS: 21 CM/S
LEFT ECA SYS: 141 CM/S
LEFT ICA DIST DIAS: 22 CM/S
LEFT ICA DIST SYS: 90 CM/S
LEFT ICA MID DIAS: 26 CM/S
LEFT ICA MID SYS: 105 CM/S
LEFT ICA PROX DIAS: 15 CM/S
LEFT ICA PROX SYS: 63 CM/S
LEFT VERTEBRAL DIAS: 7 CM/S
LEFT VERTEBRAL SYS: 35 CM/S
OHS CV CAROTID RIGHT ICA EDV HIGHEST: 27
OHS CV CAROTID ULTRASOUND LEFT ICA/CCA RATIO: 1.17
OHS CV CAROTID ULTRASOUND RIGHT ICA/CCA RATIO: 1.46
OHS CV PV CAROTID LEFT HIGHEST CCA: 90
OHS CV PV CAROTID LEFT HIGHEST ICA: 105
OHS CV PV CAROTID RIGHT HIGHEST CCA: 80
OHS CV PV CAROTID RIGHT HIGHEST ICA: 102
OHS CV US CAROTID LEFT HIGHEST EDV: 26
RIGHT CCA DIST DIAS: 14 CM/S
RIGHT CCA DIST SYS: 70 CM/S
RIGHT CCA MID DIAS: 18 CM/S
RIGHT CCA MID SYS: 73 CM/S
RIGHT CCA PROX DIAS: 18 CM/S
RIGHT CCA PROX SYS: 80 CM/S
RIGHT ECA DIAS: 23 CM/S
RIGHT ECA SYS: 262 CM/S
RIGHT ICA DIST DIAS: 27 CM/S
RIGHT ICA DIST SYS: 100 CM/S
RIGHT ICA MID DIAS: 23 CM/S
RIGHT ICA MID SYS: 102 CM/S
RIGHT ICA PROX DIAS: 24 CM/S
RIGHT ICA PROX SYS: 70 CM/S
RIGHT VERTEBRAL DIAS: 13 CM/S
RIGHT VERTEBRAL SYS: 57 CM/S

## 2021-12-14 DIAGNOSIS — K21.9 GASTROESOPHAGEAL REFLUX DISEASE WITHOUT ESOPHAGITIS: ICD-10-CM

## 2021-12-14 DIAGNOSIS — I25.118 ATHEROSCLEROTIC HEART DISEASE OF NATIVE CORONARY ARTERY WITH OTHER FORMS OF ANGINA PECTORIS: ICD-10-CM

## 2021-12-14 RX ORDER — CARVEDILOL 3.12 MG/1
3.12 TABLET ORAL 2 TIMES DAILY
Qty: 180 TABLET | Refills: 1 | Status: CANCELLED | OUTPATIENT
Start: 2021-12-14

## 2021-12-14 RX ORDER — FAMOTIDINE 20 MG/1
20 TABLET, FILM COATED ORAL NIGHTLY PRN
Qty: 90 TABLET | Refills: 1 | Status: CANCELLED | OUTPATIENT
Start: 2021-12-14 | End: 2022-12-14

## 2021-12-15 ENCOUNTER — PATIENT MESSAGE (OUTPATIENT)
Dept: CARDIOLOGY | Facility: CLINIC | Age: 86
End: 2021-12-15
Payer: MEDICARE

## 2022-01-29 DIAGNOSIS — F41.9 ANXIETY DISORDER, UNSPECIFIED TYPE: ICD-10-CM

## 2022-01-31 RX ORDER — AMITRIPTYLINE HYDROCHLORIDE 10 MG/1
20 TABLET, FILM COATED ORAL NIGHTLY PRN
Qty: 180 TABLET | Refills: 1 | Status: SHIPPED | OUTPATIENT
Start: 2022-01-31 | End: 2022-03-14 | Stop reason: SDUPTHER

## 2022-02-08 ENCOUNTER — OFFICE VISIT (OUTPATIENT)
Dept: FAMILY MEDICINE | Facility: CLINIC | Age: 87
End: 2022-02-08
Payer: MEDICARE

## 2022-02-08 VITALS
WEIGHT: 147 LBS | BODY MASS INDEX: 27.05 KG/M2 | HEIGHT: 62 IN | HEART RATE: 70 BPM | SYSTOLIC BLOOD PRESSURE: 110 MMHG | DIASTOLIC BLOOD PRESSURE: 60 MMHG

## 2022-02-08 DIAGNOSIS — F41.9 ANXIETY DISORDER, UNSPECIFIED TYPE: ICD-10-CM

## 2022-02-08 DIAGNOSIS — R54 SENILE DEBILITY: ICD-10-CM

## 2022-02-08 DIAGNOSIS — E03.9 ACQUIRED HYPOTHYROIDISM: ICD-10-CM

## 2022-02-08 DIAGNOSIS — I25.118 ATHEROSCLEROTIC HEART DISEASE OF NATIVE CORONARY ARTERY WITH OTHER FORMS OF ANGINA PECTORIS: ICD-10-CM

## 2022-02-08 DIAGNOSIS — N95.1 MENOPAUSAL STATE: ICD-10-CM

## 2022-02-08 DIAGNOSIS — E78.5 HYPERLIPIDEMIA, UNSPECIFIED HYPERLIPIDEMIA TYPE: Primary | ICD-10-CM

## 2022-02-08 DIAGNOSIS — M81.0 AGE-RELATED OSTEOPOROSIS WITHOUT CURRENT PATHOLOGICAL FRACTURE: ICD-10-CM

## 2022-02-08 DIAGNOSIS — R26.9 GAIT DISTURBANCE: ICD-10-CM

## 2022-02-08 DIAGNOSIS — F01.50 VASCULAR DEMENTIA WITHOUT BEHAVIORAL DISTURBANCE: ICD-10-CM

## 2022-02-08 DIAGNOSIS — K59.1 FUNCTIONAL DIARRHEA: ICD-10-CM

## 2022-02-08 DIAGNOSIS — I63.81 STROKE OF RIGHT BASAL GANGLIA: ICD-10-CM

## 2022-02-08 DIAGNOSIS — I10 ESSENTIAL HYPERTENSION: ICD-10-CM

## 2022-02-08 PROCEDURE — 99214 PR OFFICE/OUTPT VISIT, EST, LEVL IV, 30-39 MIN: ICD-10-PCS | Mod: S$GLB,,, | Performed by: FAMILY MEDICINE

## 2022-02-08 PROCEDURE — 99214 OFFICE O/P EST MOD 30 MIN: CPT | Mod: S$GLB,,, | Performed by: FAMILY MEDICINE

## 2022-02-08 NOTE — PROGRESS NOTES
SUBJECTIVE:    Patient ID: Mere Hoffmann is a 88 y.o. female.    Chief Complaint: Follow-up (No bottles // F/U 6 mo //abc)    88-year-old female here for six-month checkup.  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  She has recovered physically but still has dementia and cognitive impairment.  She lives with her daughter.  Her daughter organizes her pills in a weekly pill organizer.  Patient is ambulatory independently at home but is slow.    Complains of loose bowels from taking Aggrenox over the last 2 years.  She is managed with Imodium over-the-counter as needed , no rectal bleeding.    Cardiology-Dr. Watson-echocardiogram and carotid ultrasound done.  She has 1 coronary stent in place.    She does cough when she is eating.  No arnav aspiration episodes however.    Dr. HillUticmao-[id number]-arcrrjlcc her osteoporosis and hypothyroidism.  She gets a calcium level done every 6 months and Prolia injections q.6 months.      Hospital Outpatient Visit on 12/02/2021   Component Date Value Ref Range Status    [facility] CV CAROTID ULTRASOUND LEFT ICA* 12/02/2021 1.17   Final    [facility] CV CAROTID ULTRASOUND RIGHT IC* 12/02/2021 1.46   Final    [facility] CV PV CAROTID LEFT HIGHEST ICA 12/02/2021 105.00   Final    [facility] CV PV CAROTID LEFT HIGHEST CCA 12/02/2021 90   Final    [facility] CV PV CAROTID RIGHT HIGHEST ICA 12/02/2021 102.00   Final    [facility] CV PV CAROTID RIGHT HIGHEST CCA 12/02/2021 80   Final    [facility] CV CAROTID RIGHT ICA EDV HIGHE* 12/02/2021 27.00   Final    LT Highest EDV 12/02/2021 26.00   Final    Right CCA prox sys 12/02/2021 80  cm/s Final    Right CCA prox lea 12/02/2021 18  cm/s Final    Right cca dist sys 12/02/2021 70  cm/s Final    Right CCA dist lea 12/02/2021 14  cm/s Final    Right ICA prox sys 12/02/2021 70  cm/s Final    Right ICA prox lea 12/02/2021 24  cm/s Final    Right ICA mid sys 12/02/2021 102  cm/s Final    Right ICA mid lea 12/02/2021 23   cm/s Final    Right ICA dist sys 12/02/2021 100  cm/s Final    Right ICA dist lea 12/02/2021 27  cm/s Final    Right eca sys 12/02/2021 262  cm/s Final    Right vertebral sys 12/02/2021 57  cm/s Final    Left CCA prox sys 12/02/2021 78  cm/s Final    Left CCA prox lea 12/02/2021 14  cm/s Final    Left CCA dist sys 12/02/2021 90  cm/s Final    Left CCA dist lea 12/02/2021 20  cm/s Final    Left ICA prox sys 12/02/2021 63  cm/s Final    Left ICA prox lea 12/02/2021 15  cm/s Final    Left ICA mid sys 12/02/2021 105  cm/s Final    Left ICA mid lea 12/02/2021 26  cm/s Final    Left ICA dist sys 12/02/2021 90  cm/s Final    Left ICA dist lea 12/02/2021 22  cm/s Final    Left ECA sys 12/02/2021 141  cm/s Final    Left vertebral sys 12/02/2021 35  cm/s Final    Left CCA mid sys 12/02/2021 86  cm/s Final    Left CCA mid lea 12/02/2021 18  cm/s Final    Right CCA mid sys 12/02/2021 73  cm/s Final    Right CCA mid lea 12/02/2021 18  cm/s Final    Right vertebral lea 12/02/2021 13  cm/s Final    Right ECA lea 12/02/2021 23  cm/s Final    Left vertebral lea 12/02/2021 7  cm/s Final    Left ECA lea 12/02/2021 21  cm/s Final   Hospital Outpatient Visit on 12/02/2021   Component Date Value Ref Range Status    BSA 12/02/2021 1.72  m2 Final    EF 12/02/2021 65  % Final   Lab Visit on 09/30/2021   Component Date Value Ref Range Status    Calcium 09/30/2021 9.0  8.7 - 10.5 mg/dL Final   Lab Visit on 08/09/2021   Component Date Value Ref Range Status    WBC 08/09/2021 6.15  3.90 - 12.70 K/uL Final    RBC 08/09/2021 4.14  4.00 - 5.40 M/uL Final    Hemoglobin 08/09/2021 12.6  12.0 - 16.0 g/dL Final    Hematocrit 08/09/2021 38.6  37.0 - 48.5 % Final    MCV 08/09/2021 93  82 - 98 fL Final    MCH 08/09/2021 30.4  27.0 - 31.0 pg Final    MCHC 08/09/2021 32.6  32.0 - 36.0 g/dL Final    RDW 08/09/2021 12.6  11.5 - 14.5 % Final    Platelets 08/09/2021 208  150 - 450 K/uL Final    MPV 08/09/2021  9.5  9.2 - 12.9 fL Final    Immature Granulocytes 08/09/2021 0.3  0.0 - 0.5 % Final    Gran # (ANC) 08/09/2021 3.9  1.8 - 7.7 K/uL Final    Immature Grans (Abs) 08/09/2021 0.02  0.00 - 0.04 K/uL Final    Lymph # 08/09/2021 1.6  1.0 - 4.8 K/uL Final    Mono # 08/09/2021 0.4  0.3 - 1.0 K/uL Final    Eos # 08/09/2021 0.2  0.0 - 0.5 K/uL Final    Baso # 08/09/2021 0.06  0.00 - 0.20 K/uL Final    nRBC 08/09/2021 0  0 /100 WBC Final    Gran % 08/09/2021 63.2  38.0 - 73.0 % Final    Lymph % 08/09/2021 25.5  18.0 - 48.0 % Final    Mono % 08/09/2021 7.2  4.0 - 15.0 % Final    Eosinophil % 08/09/2021 2.8  0.0 - 8.0 % Final    Basophil % 08/09/2021 1.0  0.0 - 1.9 % Final    Differential Method 08/09/2021 Automated   Final    Sodium 08/09/2021 141  136 - 145 mmol/L Final    Potassium 08/09/2021 4.3  3.5 - 5.1 mmol/L Final    Chloride 08/09/2021 108  95 - 110 mmol/L Final    CO2 08/09/2021 25  23 - 29 mmol/L Final    Glucose 08/09/2021 106  70 - 110 mg/dL Final    BUN 08/09/2021 14  8 - 23 mg/dL Final    Creatinine 08/09/2021 0.9  0.5 - 1.4 mg/dL Final    Calcium 08/09/2021 8.4* 8.7 - 10.5 mg/dL Final    Total Protein 08/09/2021 6.8  6.0 - 8.4 g/dL Final    Albumin 08/09/2021 3.6  3.5 - 5.2 g/dL Final    Total Bilirubin 08/09/2021 0.9  0.1 - 1.0 mg/dL Final    Alkaline Phosphatase 08/09/2021 85  55 - 135 U/L Final    AST 08/09/2021 20  10 - 40 U/L Final    ALT 08/09/2021 23  10 - 44 U/L Final    Anion Gap 08/09/2021 8  8 - 16 mmol/L Final    eGFR if African American 08/09/2021 >60.0  >60 mL/min/1.73 m^2 Final    eGFR if non  08/09/2021 57.3* >60 mL/min/1.73 m^2 Final    Cholesterol 08/09/2021 151  120 - 199 mg/dL Final    Triglycerides 08/09/2021 144  30 - 150 mg/dL Final    HDL 08/09/2021 44  40 - 75 mg/dL Final    LDL Cholesterol 08/09/2021 78.2  63.0 - 159.0 mg/dL Final    HDL/Cholesterol Ratio 08/09/2021 29.1  20.0 - 50.0 % Final    Total Cholesterol/HDL Ratio 08/09/2021  3.4  2.0 - 5.0 Final    Non-HDL Cholesterol 08/09/2021 107  mg/dL Final    Sodium 08/09/2021 141  136 - 145 mmol/L Final    Potassium 08/09/2021 4.3  3.5 - 5.1 mmol/L Final    Chloride 08/09/2021 108  95 - 110 mmol/L Final    CO2 08/09/2021 25  23 - 29 mmol/L Final    Glucose 08/09/2021 106  70 - 110 mg/dL Final    BUN 08/09/2021 14  8 - 23 mg/dL Final    Creatinine 08/09/2021 0.9  0.5 - 1.4 mg/dL Final    Calcium 08/09/2021 8.4* 8.7 - 10.5 mg/dL Final    Anion Gap 08/09/2021 8  8 - 16 mmol/L Final    eGFR if African American 08/09/2021 >60.0  >60 mL/min/1.73 m^2 Final    eGFR if non  08/09/2021 57.3* >60 mL/min/1.73 m^2 Final    TSH 08/09/2021 1.850  0.340 - 5.600 uIU/mL Final    Vit D, 25-Hydroxy 08/09/2021 41  30 - 96 ng/mL Final       History reviewed. No pertinent past medical history.  Social History     Socioeconomic History    Marital status:    Tobacco Use    Smoking status: Former Smoker    Smokeless tobacco: Never Used     Past Surgical History:   Procedure Laterality Date    CHOLECYSTECTOMY      moh's surgery  2019    basal cell ca     History reviewed. No pertinent family history.    Review of patient's allergies indicates:   Allergen Reactions    Penicillins     Sulfa (sulfonamide antibiotics)        Current Outpatient Medications:     amitriptyline (ELAVIL) 10 MG tablet, Take 2 tablets (20 mg total) by mouth nightly as needed for Insomnia., Disp: 180 tablet, Rfl: 1    ascorbate calcium (NEFTALI-C ORAL), Take 0.5 tablets by mouth once daily., Disp: , Rfl:     atorvastatin (LIPITOR) 40 MG tablet, Take 1 tablet (40 mg total) by mouth once daily., Disp: 90 tablet, Rfl: 1    carvediloL (COREG) 3.125 MG tablet, Take 1 tablet (3.125 mg total) by mouth 2 (two) times daily., Disp: 180 tablet, Rfl: 1    cholecalciferol, vitamin D3, (VITAMIN D3) 50 mcg (2,000 unit) Cap, Take 1 capsule by mouth once daily., Disp: , Rfl:     clobetasoL (TEMOVATE) 0.05 % external  solution, Apply to scalp 1-2x/day prn itching/scaling, Disp: 50 mL, Rfl: 11    cyanocobalamin, vitamin B-12, 1,000 mcg TbSR, Take by mouth., Disp: , Rfl:     dipyridamole-aspirin 200-25 mg (AGGRENOX)  mg CM12, TK 1 C PO BID, Disp: 180 capsule, Rfl: 1    donepeziL (ARICEPT) 5 MG tablet, Take 1 tablet (5 mg total) by mouth every evening., Disp: 30 tablet, Rfl: 5    famotidine (PEPCID) 20 MG tablet, Take 1 tablet (20 mg total) by mouth nightly as needed for Heartburn., Disp: 90 tablet, Rfl: 1    FLUoxetine 10 MG capsule, Take 1 capsule (10 mg total) by mouth once daily., Disp: 90 capsule, Rfl: 1    multivitamin capsule, Take 1 capsule by mouth once daily., Disp: , Rfl:     nitroGLYCERIN (NITROSTAT) 0.3 MG SL tablet, Place 0.3 mg under the tongue every 5 (five) minutes as needed for Chest pain., Disp: , Rfl:     pantoprazole (PROTONIX) 40 MG tablet, Take 1 tablet (40 mg total) by mouth once daily., Disp: 90 tablet, Rfl: 1    UNITHROID 75 mcg tablet, Take 1 tablet (75 mcg total) by mouth before breakfast., Disp: 90 tablet, Rfl: 1    Review of Systems   Constitutional: Negative for appetite change, chills, fatigue, fever and unexpected weight change.   HENT: Negative for congestion, ear pain, sinus pain, sore throat and trouble swallowing.    Eyes: Negative for pain, discharge and visual disturbance.   Respiratory: Positive for cough (  After meals.). Negative for apnea, shortness of breath and wheezing.    Cardiovascular: Negative for chest pain, palpitations and leg swelling.   Gastrointestinal: Positive for diarrhea (  Due to Aggrenox). Negative for abdominal pain, blood in stool, constipation, nausea and vomiting.   Endocrine: Negative for heat intolerance, polydipsia and polyuria.   Genitourinary: Negative for difficulty urinating, dyspareunia, dysuria, frequency, hematuria and menstrual problem.   Musculoskeletal: Positive for gait problem ( slow wobbly gait). Negative for arthralgias, back pain,  "joint swelling and myalgias.   Allergic/Immunologic: Negative for environmental allergies, food allergies and immunocompromised state.   Neurological: Negative for dizziness, tremors, seizures, numbness and headaches.   Psychiatric/Behavioral: Positive for confusion ( memory impairment due to dementia). Negative for behavioral problems, hallucinations and suicidal ideas. The patient is not nervous/anxious.           Objective:      Vitals:    02/08/22 1043   BP: 110/60   Pulse: 70   Weight: 66.7 kg (147 lb)   Height: 5' 2" (1.575 m)     Physical Exam      Assessment:       1. Hyperlipidemia, unspecified hyperlipidemia type    2. Vascular dementia without behavioral disturbance    3. Anxiety disorder, unspecified type    4. Atherosclerotic heart disease of native coronary artery with other forms of angina pectoris    5. Essential hypertension    6. Menopausal state    7. Acquired hypothyroidism    8. Age-related osteoporosis without current pathological fracture    9. Gait disturbance    10. 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 Functional diarrhea         Plan:       Hyperlipidemia, unspecified hyperlipidemia type  -     CBC Auto Differential; Future; Expected date: 02/08/2022  -     Comprehensive Metabolic Panel; Future; Expected date: 02/08/2022  -     Lipid Panel; Future; Expected date: 02/08/2022  Continue current medications.  Due for lipid panel this week  Vascular dementia without behavioral disturbance  Stable and managed well by her daughter.  Anxiety disorder, unspecified type  Continue current medications  Atherosclerotic heart disease of native coronary artery with other forms of angina pectoris  She is looking at getting a new cardiologist in the [facility].  Essential hypertension  Blood pressure well controlled  Menopausal state    Acquired hypothyroidism  Stable at this time  Age-related osteoporosis without current pathological fracture   Dr. Hill monitoring this  Gait " disturbance    Stroke of right basal ganglia    Senile debility    Functional diarrhea  Continue Imodium 80 over-the-counter as needed    Follow up in about 6 months (around 8/8/2022).        2/8/2022 Vidal Kraus

## 2022-02-17 ENCOUNTER — LAB VISIT (OUTPATIENT)
Dept: LAB | Facility: HOSPITAL | Age: 87
End: 2022-02-17
Attending: INTERNAL MEDICINE
Payer: MEDICARE

## 2022-02-17 DIAGNOSIS — Z79.899 ENCOUNTER FOR LONG-TERM (CURRENT) USE OF OTHER MEDICATIONS: Primary | ICD-10-CM

## 2022-02-17 DIAGNOSIS — E78.5 HYPERLIPIDEMIA, UNSPECIFIED HYPERLIPIDEMIA TYPE: ICD-10-CM

## 2022-02-17 LAB
ALBUMIN SERPL BCP-MCNC: 3.7 G/DL (ref 3.5–5.2)
ALP SERPL-CCNC: 85 U/L (ref 55–135)
ALT SERPL W/O P-5'-P-CCNC: 20 U/L (ref 10–44)
ANION GAP SERPL CALC-SCNC: 12 MMOL/L (ref 8–16)
AST SERPL-CCNC: 18 U/L (ref 10–40)
BASOPHILS # BLD AUTO: 0.05 K/UL (ref 0–0.2)
BASOPHILS NFR BLD: 0.9 % (ref 0–1.9)
BILIRUB SERPL-MCNC: 0.6 MG/DL (ref 0.1–1)
BUN SERPL-MCNC: 17 MG/DL (ref 8–23)
CALCIUM SERPL-MCNC: 8.9 MG/DL (ref 8.7–10.5)
CALCIUM SERPL-MCNC: 8.9 MG/DL (ref 8.7–10.5)
CHLORIDE SERPL-SCNC: 106 MMOL/L (ref 95–110)
CHOLEST SERPL-MCNC: 145 MG/DL (ref 120–199)
CHOLEST/HDLC SERPL: 3 {RATIO} (ref 2–5)
CO2 SERPL-SCNC: 24 MMOL/L (ref 23–29)
CREAT SERPL-MCNC: 0.9 MG/DL (ref 0.5–1.4)
DIFFERENTIAL METHOD: NORMAL
EOSINOPHIL # BLD AUTO: 0.3 K/UL (ref 0–0.5)
EOSINOPHIL NFR BLD: 4.7 % (ref 0–8)
ERYTHROCYTE [DISTWIDTH] IN BLOOD BY AUTOMATED COUNT: 12.9 % (ref 11.5–14.5)
EST. GFR  (AFRICAN AMERICAN): >60 ML/MIN/1.73 M^2
EST. GFR  (NON AFRICAN AMERICAN): 57.3 ML/MIN/1.73 M^2
GLUCOSE SERPL-MCNC: 92 MG/DL (ref 70–110)
HCT VFR BLD AUTO: 39.2 % (ref 37–48.5)
HDLC SERPL-MCNC: 48 MG/DL (ref 40–75)
HDLC SERPL: 33.1 % (ref 20–50)
HGB BLD-MCNC: 12.8 G/DL (ref 12–16)
IMM GRANULOCYTES # BLD AUTO: 0.02 K/UL (ref 0–0.04)
IMM GRANULOCYTES NFR BLD AUTO: 0.3 % (ref 0–0.5)
LDLC SERPL CALC-MCNC: 72.6 MG/DL (ref 63–159)
LYMPHOCYTES # BLD AUTO: 1.9 K/UL (ref 1–4.8)
LYMPHOCYTES NFR BLD: 32.2 % (ref 18–48)
MCH RBC QN AUTO: 29.6 PG (ref 27–31)
MCHC RBC AUTO-ENTMCNC: 32.7 G/DL (ref 32–36)
MCV RBC AUTO: 91 FL (ref 82–98)
MONOCYTES # BLD AUTO: 0.5 K/UL (ref 0.3–1)
MONOCYTES NFR BLD: 8.2 % (ref 4–15)
NEUTROPHILS # BLD AUTO: 3.1 K/UL (ref 1.8–7.7)
NEUTROPHILS NFR BLD: 53.7 % (ref 38–73)
NONHDLC SERPL-MCNC: 97 MG/DL
NRBC BLD-RTO: 0 /100 WBC
PLATELET # BLD AUTO: 242 K/UL (ref 150–450)
PMV BLD AUTO: 9.3 FL (ref 9.2–12.9)
POTASSIUM SERPL-SCNC: 3.9 MMOL/L (ref 3.5–5.1)
PROT SERPL-MCNC: 6.9 G/DL (ref 6–8.4)
RBC # BLD AUTO: 4.33 M/UL (ref 4–5.4)
SODIUM SERPL-SCNC: 142 MMOL/L (ref 136–145)
TRIGL SERPL-MCNC: 122 MG/DL (ref 30–150)
WBC # BLD AUTO: 5.75 K/UL (ref 3.9–12.7)

## 2022-02-17 PROCEDURE — 80053 COMPREHEN METABOLIC PANEL: CPT | Performed by: FAMILY MEDICINE

## 2022-02-17 PROCEDURE — 80061 LIPID PANEL: CPT | Performed by: FAMILY MEDICINE

## 2022-02-17 PROCEDURE — 85025 COMPLETE CBC W/AUTO DIFF WBC: CPT | Performed by: FAMILY MEDICINE

## 2022-02-17 PROCEDURE — 36415 COLL VENOUS BLD VENIPUNCTURE: CPT | Performed by: FAMILY MEDICINE

## 2022-02-21 ENCOUNTER — TELEPHONE (OUTPATIENT)
Dept: FAMILY MEDICINE | Facility: CLINIC | Age: 87
End: 2022-02-21
Payer: MEDICARE

## 2022-02-21 NOTE — TELEPHONE ENCOUNTER
Spoke to daughter, Marlin, with results verbatim per Dr Kraus. Said that she saw them on the portal.

## 2022-02-21 NOTE — TELEPHONE ENCOUNTER
----- Message from Vidal Kraus MD sent at 2/20/2022  6:20 PM CST -----  Call patient's daughter.  Lab work looks excellent.  Sugar kidneys liver all normal.  Cholesterol normal at 145. CBC shows no anemia.  Continue current medications.

## 2022-02-21 NOTE — PROGRESS NOTES
Call patient's daughter.  Lab work looks excellent.  Sugar kidneys liver all normal.  Cholesterol normal at 145. CBC shows no anemia.  Continue current medications.

## 2022-03-02 ENCOUNTER — HOSPITAL ENCOUNTER (EMERGENCY)
Facility: HOSPITAL | Age: 87
Discharge: HOME | End: 2022-03-02
Attending: EMERGENCY MEDICINE
Payer: MEDICARE

## 2022-03-02 VITALS
RESPIRATION RATE: 20 BRPM | SYSTOLIC BLOOD PRESSURE: 141 MMHG | OXYGEN SATURATION: 96 % | HEIGHT: 62 IN | BODY MASS INDEX: 27.6 KG/M2 | WEIGHT: 150 LBS | TEMPERATURE: 98 F | HEART RATE: 89 BPM | DIASTOLIC BLOOD PRESSURE: 63 MMHG

## 2022-03-02 DIAGNOSIS — R11.2 NON-INTRACTABLE VOMITING WITH NAUSEA, UNSPECIFIED VOMITING TYPE: Primary | ICD-10-CM

## 2022-03-02 DIAGNOSIS — R19.7 DIARRHEA, UNSPECIFIED TYPE: ICD-10-CM

## 2022-03-02 DIAGNOSIS — R53.1 WEAKNESS: ICD-10-CM

## 2022-03-02 LAB
ALBUMIN SERPL BCP-MCNC: 3.6 G/DL (ref 3.5–5.2)
ALP SERPL-CCNC: 84 U/L (ref 55–135)
ALT SERPL W/O P-5'-P-CCNC: 20 U/L (ref 10–44)
AMYLASE SERPL-CCNC: 45 U/L (ref 20–110)
ANION GAP SERPL CALC-SCNC: 9 MMOL/L (ref 8–16)
APTT PPP: 23.8 SEC (ref 23.3–35.1)
AST SERPL-CCNC: 20 U/L (ref 10–40)
BASOPHILS # BLD AUTO: 0.02 K/UL (ref 0–0.2)
BASOPHILS NFR BLD: 0.2 % (ref 0–1.9)
BILIRUB SERPL-MCNC: 0.9 MG/DL (ref 0.1–1)
BUN SERPL-MCNC: 18 MG/DL (ref 8–23)
C DIFF GDH STL QL: NEGATIVE
C DIFF TOX A+B STL QL IA: NEGATIVE
CALCIUM SERPL-MCNC: 7.3 MG/DL (ref 8.7–10.5)
CHLORIDE SERPL-SCNC: 109 MMOL/L (ref 95–110)
CK MB SERPL-MCNC: 1 NG/ML (ref 0.1–6.5)
CK SERPL-CCNC: 69 U/L (ref 20–180)
CO2 SERPL-SCNC: 20 MMOL/L (ref 23–29)
CREAT SERPL-MCNC: 0.8 MG/DL (ref 0.5–1.4)
CREAT SERPL-MCNC: 1 MG/DL (ref 0.5–1.4)
DIFFERENTIAL METHOD: ABNORMAL
EOSINOPHIL # BLD AUTO: 0.1 K/UL (ref 0–0.5)
EOSINOPHIL NFR BLD: 0.8 % (ref 0–8)
ERYTHROCYTE [DISTWIDTH] IN BLOOD BY AUTOMATED COUNT: 13 % (ref 11.5–14.5)
EST. GFR  (AFRICAN AMERICAN): 58.1 ML/MIN/1.73 M^2
EST. GFR  (NON AFRICAN AMERICAN): 50.4 ML/MIN/1.73 M^2
GLUCOSE SERPL-MCNC: 138 MG/DL (ref 70–110)
HCT VFR BLD AUTO: 41.7 % (ref 37–48.5)
HGB BLD-MCNC: 13.2 G/DL (ref 12–16)
IMM GRANULOCYTES # BLD AUTO: 0.02 K/UL (ref 0–0.04)
IMM GRANULOCYTES NFR BLD AUTO: 0.2 % (ref 0–0.5)
INR PPP: 1.3
LIPASE SERPL-CCNC: 24 U/L (ref 4–60)
LYMPHOCYTES # BLD AUTO: 0.4 K/UL (ref 1–4.8)
LYMPHOCYTES NFR BLD: 5.2 % (ref 18–48)
MAGNESIUM SERPL-MCNC: 2 MG/DL (ref 1.6–2.6)
MCH RBC QN AUTO: 29.3 PG (ref 27–31)
MCHC RBC AUTO-ENTMCNC: 31.7 G/DL (ref 32–36)
MCV RBC AUTO: 93 FL (ref 82–98)
MONOCYTES # BLD AUTO: 0.5 K/UL (ref 0.3–1)
MONOCYTES NFR BLD: 6.1 % (ref 4–15)
NEUTROPHILS # BLD AUTO: 7.2 K/UL (ref 1.8–7.7)
NEUTROPHILS NFR BLD: 87.5 % (ref 38–73)
NRBC BLD-RTO: 0 /100 WBC
OB PNL STL: NEGATIVE
PLATELET # BLD AUTO: 188 K/UL (ref 150–450)
PMV BLD AUTO: 9.2 FL (ref 9.2–12.9)
POTASSIUM SERPL-SCNC: 3.5 MMOL/L (ref 3.5–5.1)
PROT SERPL-MCNC: 6.9 G/DL (ref 6–8.4)
PROTHROMBIN TIME: 15.1 SEC (ref 11.4–13.7)
RBC # BLD AUTO: 4.51 M/UL (ref 4–5.4)
SAMPLE: NORMAL
SARS-COV-2 RDRP RESP QL NAA+PROBE: NEGATIVE
SODIUM SERPL-SCNC: 138 MMOL/L (ref 136–145)
TROPONIN I SERPL DL<=0.01 NG/ML-MCNC: <0.03 NG/ML
WBC # BLD AUTO: 8.24 K/UL (ref 3.9–12.7)
WBC #/AREA STL HPF: NORMAL /[HPF]

## 2022-03-02 PROCEDURE — 82550 ASSAY OF CK (CPK): CPT | Performed by: EMERGENCY MEDICINE

## 2022-03-02 PROCEDURE — 63600175 PHARM REV CODE 636 W HCPCS: Performed by: EMERGENCY MEDICINE

## 2022-03-02 PROCEDURE — 87046 STOOL CULTR AEROBIC BACT EA: CPT | Performed by: EMERGENCY MEDICINE

## 2022-03-02 PROCEDURE — 96375 TX/PRO/DX INJ NEW DRUG ADDON: CPT

## 2022-03-02 PROCEDURE — 93010 ELECTROCARDIOGRAM REPORT: CPT | Mod: ,,, | Performed by: INTERNAL MEDICINE

## 2022-03-02 PROCEDURE — 82553 CREATINE MB FRACTION: CPT | Performed by: EMERGENCY MEDICINE

## 2022-03-02 PROCEDURE — 87045 FECES CULTURE AEROBIC BACT: CPT | Performed by: EMERGENCY MEDICINE

## 2022-03-02 PROCEDURE — 89055 LEUKOCYTE ASSESSMENT FECAL: CPT | Performed by: EMERGENCY MEDICINE

## 2022-03-02 PROCEDURE — 84484 ASSAY OF TROPONIN QUANT: CPT | Performed by: EMERGENCY MEDICINE

## 2022-03-02 PROCEDURE — 82272 OCCULT BLD FECES 1-3 TESTS: CPT | Performed by: EMERGENCY MEDICINE

## 2022-03-02 PROCEDURE — 99285 EMERGENCY DEPT VISIT HI MDM: CPT | Mod: 25

## 2022-03-02 PROCEDURE — 25000003 PHARM REV CODE 250: Performed by: EMERGENCY MEDICINE

## 2022-03-02 PROCEDURE — 93010 EKG 12-LEAD: ICD-10-PCS | Mod: ,,, | Performed by: INTERNAL MEDICINE

## 2022-03-02 PROCEDURE — 83735 ASSAY OF MAGNESIUM: CPT | Performed by: EMERGENCY MEDICINE

## 2022-03-02 PROCEDURE — 85730 THROMBOPLASTIN TIME PARTIAL: CPT | Performed by: EMERGENCY MEDICINE

## 2022-03-02 PROCEDURE — 96361 HYDRATE IV INFUSION ADD-ON: CPT

## 2022-03-02 PROCEDURE — 82150 ASSAY OF AMYLASE: CPT | Performed by: EMERGENCY MEDICINE

## 2022-03-02 PROCEDURE — 87449 NOS EACH ORGANISM AG IA: CPT | Performed by: EMERGENCY MEDICINE

## 2022-03-02 PROCEDURE — U0002 COVID-19 LAB TEST NON-CDC: HCPCS | Performed by: EMERGENCY MEDICINE

## 2022-03-02 PROCEDURE — 93005 ELECTROCARDIOGRAM TRACING: CPT | Performed by: INTERNAL MEDICINE

## 2022-03-02 PROCEDURE — 87209 SMEAR COMPLEX STAIN: CPT | Performed by: EMERGENCY MEDICINE

## 2022-03-02 PROCEDURE — 80053 COMPREHEN METABOLIC PANEL: CPT | Performed by: EMERGENCY MEDICINE

## 2022-03-02 PROCEDURE — 85610 PROTHROMBIN TIME: CPT | Performed by: EMERGENCY MEDICINE

## 2022-03-02 PROCEDURE — 87177 OVA AND PARASITES SMEARS: CPT | Performed by: EMERGENCY MEDICINE

## 2022-03-02 PROCEDURE — 25500020 PHARM REV CODE 255: Performed by: EMERGENCY MEDICINE

## 2022-03-02 PROCEDURE — 96374 THER/PROPH/DIAG INJ IV PUSH: CPT

## 2022-03-02 PROCEDURE — 85025 COMPLETE CBC W/AUTO DIFF WBC: CPT | Performed by: EMERGENCY MEDICINE

## 2022-03-02 PROCEDURE — 83690 ASSAY OF LIPASE: CPT | Performed by: EMERGENCY MEDICINE

## 2022-03-02 RX ORDER — PROMETHAZINE HYDROCHLORIDE 25 MG/1
25 TABLET ORAL EVERY 6 HOURS PRN
Qty: 15 TABLET | Refills: 0 | Status: SHIPPED | OUTPATIENT
Start: 2022-03-02

## 2022-03-02 RX ORDER — HYOSCYAMINE SULFATE 0.5 MG/ML
0.12 INJECTION, SOLUTION SUBCUTANEOUS
Status: COMPLETED | OUTPATIENT
Start: 2022-03-02 | End: 2022-03-02

## 2022-03-02 RX ORDER — ONDANSETRON 2 MG/ML
4 INJECTION INTRAMUSCULAR; INTRAVENOUS
Status: COMPLETED | OUTPATIENT
Start: 2022-03-02 | End: 2022-03-02

## 2022-03-02 RX ORDER — DIPHENOXYLATE HYDROCHLORIDE AND ATROPINE SULFATE 2.5; .025 MG/1; MG/1
1 TABLET ORAL 4 TIMES DAILY PRN
Qty: 15 TABLET | Refills: 0 | Status: SHIPPED | OUTPATIENT
Start: 2022-03-02 | End: 2022-03-06

## 2022-03-02 RX ADMIN — HYOSCYAMINE SULFATE 0.12 MG: 0.5 INJECTION, SOLUTION SUBCUTANEOUS at 03:03

## 2022-03-02 RX ADMIN — ONDANSETRON 4 MG: 2 INJECTION INTRAMUSCULAR; INTRAVENOUS at 03:03

## 2022-03-02 RX ADMIN — IOHEXOL 100 ML: 350 INJECTION, SOLUTION INTRAVENOUS at 03:03

## 2022-03-02 RX ADMIN — SODIUM CHLORIDE 500 ML: 0.9 INJECTION, SOLUTION INTRAVENOUS at 03:03

## 2022-03-02 NOTE — DISCHARGE INSTRUCTIONS
RETURN TO EMERGENCY DEPARTMENT WITHOUT FAIL, IF YOUR SYMPTOMS WORSEN, IF YOU GET NEW OR DIFFERENT SYMPTOMS, IF YOU ARE UNABLE TO FOLLOW UP AS DIRECTED, OR IF YOU HAVE ANY CONCERNS OR WORRIES.    Clear liquid diet for 12-24 hours.  Advance diet as tolerated.  Follow-up with primary care provider

## 2022-03-02 NOTE — ED PROVIDER NOTES
Encounter Date: 3/2/2022       History     Chief Complaint   Patient presents with    Nausea     Pt here  from home via ems with c/o nausea, vomiting, and diarrhea for the past three days. Denies any pain.     Patient reports approximately 3 day history of proximally 4 episodes of loose stool per day, nausea with 2 episodes of emesis in the last 24 hours.  Patient has intermittent crampy abdominal pain.  No fever chills.  No gastrointestinal bleeding.  No relieving factors at home.  Symptoms continue in the emergency room.  Patient does report generalized weakness.  She denies any chest pain shortness of breath.  There is no dysuria.        Review of patient's allergies indicates:   Allergen Reactions    Penicillins     Sulfa (sulfonamide antibiotics)      No past medical history on file.  Past Surgical History:   Procedure Laterality Date    CHOLECYSTECTOMY      moh's surgery  2019    basal cell ca     No family history on file.  Social History     Tobacco Use    Smoking status: Former Smoker    Smokeless tobacco: Never Used     Review of Systems   Constitutional: Negative for chills and fever.   HENT: Negative for congestion.    Eyes: Negative for visual disturbance.   Respiratory: Negative for shortness of breath.    Cardiovascular: Negative for chest pain and palpitations.   Gastrointestinal: Positive for abdominal pain, diarrhea, nausea and vomiting. Negative for blood in stool.   Genitourinary: Negative for dysuria.   Musculoskeletal: Negative for joint swelling.   Neurological: Positive for weakness. Negative for seizures, syncope and headaches.   Psychiatric/Behavioral: Negative for confusion.       Physical Exam     Initial Vitals [03/02/22 1453]   BP Pulse Resp Temp SpO2   (!) 154/68 84 18 98 °F (36.7 °C) 97 %      MAP       --         Physical Exam    Nursing note and vitals reviewed.  Constitutional: She is not diaphoretic. No distress.   HENT:   Head: Normocephalic and atraumatic.   Eyes:  Conjunctivae are normal.   Neck:   Normal range of motion.  Cardiovascular: Normal rate.   Pulmonary/Chest: Breath sounds normal.   Abdominal: Abdomen is soft. Bowel sounds are normal.   Mild diffuse abdominal tenderness with no guarding or rebound   Musculoskeletal:         General: Normal range of motion.      Cervical back: Normal range of motion.     Neurological: She is alert. She has normal strength. No cranial nerve deficit or sensory deficit.   No gross deficits   Skin: No rash noted.   Psychiatric: She has a normal mood and affect.         ED Course   Procedures  Labs Reviewed   CBC W/ AUTO DIFFERENTIAL - Abnormal; Notable for the following components:       Result Value    MCHC 31.7 (*)     Lymph # 0.4 (*)     Gran % 87.5 (*)     Lymph % 5.2 (*)     All other components within normal limits   COMPREHENSIVE METABOLIC PANEL - Abnormal; Notable for the following components:    CO2 20 (*)     Glucose 138 (*)     Calcium 7.3 (*)     eGFR if  58.1 (*)     eGFR if non  50.4 (*)     All other components within normal limits   PROTIME-INR - Abnormal; Notable for the following components:    PT 15.1 (*)     All other components within normal limits   CLOSTRIDIUM DIFFICILE   CULTURE, STOOL   SARS-COV-2 RNA AMPLIFICATION, QUAL   APTT   AMYLASE   LIPASE   MAGNESIUM   TROPONIN I   CK-MB   CK   OCCULT BLOOD X 1, STOOL   STOOL EXAM-OVA,CYSTS,PARASITES   WBC, STOOL   URINALYSIS, REFLEX TO URINE CULTURE   ISTAT CREATININE   POCT CREATININE        ECG Results          EKG 12-lead (In process)  Result time 03/02/22 15:15:29    In process by Interface, Lab In [facility] (03/02/22 15:15:29)                 Narrative:    Test Reason : R53.1,    Vent. Rate : 082 BPM     Atrial Rate : 082 BPM     P-R Int : 136 ms          QRS Dur : 080 ms      QT Int : 380 ms       P-R-T Axes : 030 010 093 degrees     QTc Int : 443 ms    Normal sinus rhythm  Nonspecific ST and T wave abnormality  Abnormal ECG  When  compared with ECG of 04-FEB-2021 08:35,  Nonspecific T wave abnormality, worse in Inferior leads  Nonspecific T wave abnormality, worse in Anterior-lateral leads    Referred By:             Confirmed By:                   In process by Interface, Lab In [facility] (03/02/22 15:14:59)                 Narrative:    Test Reason : R53.1,    Vent. Rate : 082 BPM     Atrial Rate : 082 BPM     P-R Int : 136 ms          QRS Dur : 080 ms      QT Int : 380 ms       P-R-T Axes : 030 010 093 degrees     QTc Int : 443 ms    Normal sinus rhythm  Nonspecific ST and T wave abnormality  Abnormal ECG  When compared with ECG of 04-FEB-2021 08:35,  Nonspecific T wave abnormality, worse in Inferior leads  Nonspecific T wave abnormality, worse in Anterior-lateral leads    Referred By:             Confirmed By:                              Results for orders placed or performed during the hospital encounter of 03/02/22   COVID-19 Rapid Screening   Result Value Ref Range    SARS-CoV-2 RNA, Amplification, Qual Negative Negative   APTT   Result Value Ref Range    aPTT 23.8 23.3 - 35.1 sec   Amylase   Result Value Ref Range    Amylase 45 20 - 110 U/L   CBC Auto Differential   Result Value Ref Range    WBC 8.24 3.90 - 12.70 K/uL    RBC 4.51 4.00 - 5.40 M/uL    Hemoglobin 13.2 12.0 - 16.0 g/dL    Hematocrit 41.7 37.0 - 48.5 %    MCV 93 82 - 98 fL    MCH 29.3 27.0 - 31.0 pg    MCHC 31.7 (L) 32.0 - 36.0 g/dL    RDW 13.0 11.5 - 14.5 %    Platelets 188 150 - 450 K/uL    MPV 9.2 9.2 - 12.9 fL    Immature Granulocytes 0.2 0.0 - 0.5 %    Gran # (ANC) 7.2 1.8 - 7.7 K/uL    Immature Grans (Abs) 0.02 0.00 - 0.04 K/uL    Lymph # 0.4 (L) 1.0 - 4.8 K/uL    Mono # 0.5 0.3 - 1.0 K/uL    Eos # 0.1 0.0 - 0.5 K/uL    Baso # 0.02 0.00 - 0.20 K/uL    nRBC 0 0 /100 WBC    Gran % 87.5 (H) 38.0 - 73.0 %    Lymph % 5.2 (L) 18.0 - 48.0 %    Mono % 6.1 4.0 - 15.0 %    Eosinophil % 0.8 0.0 - 8.0 %    Basophil % 0.2 0.0 - 1.9 %    Differential Method Automated     Comprehensive Metabolic Panel   Result Value Ref Range    Sodium 138 136 - 145 mmol/L    Potassium 3.5 3.5 - 5.1 mmol/L    Chloride 109 95 - 110 mmol/L    CO2 20 (L) 23 - 29 mmol/L    Glucose 138 (H) 70 - 110 mg/dL    BUN 18 8 - 23 mg/dL    Creatinine 1.0 0.5 - 1.4 mg/dL    Calcium 7.3 (L) 8.7 - 10.5 mg/dL    Total Protein 6.9 6.0 - 8.4 g/dL    Albumin 3.6 3.5 - 5.2 g/dL    Total Bilirubin 0.9 0.1 - 1.0 mg/dL    Alkaline Phosphatase 84 55 - 135 U/L    AST 20 10 - 40 U/L    ALT 20 10 - 44 U/L    Anion Gap 9 8 - 16 mmol/L    eGFR if African American 58.1 (A) >60 mL/min/1.73 m^2    eGFR if non African American 50.4 (A) >60 mL/min/1.73 m^2   Lipase   Result Value Ref Range    Lipase 24 4 - 60 U/L   Magnesium   Result Value Ref Range    Magnesium 2.0 1.6 - 2.6 mg/dL   Protime-INR   Result Value Ref Range    PT 15.1 (H) 11.4 - 13.7 sec    INR 1.3    Troponin I   Result Value Ref Range    Troponin I <0.030 <=0.040 ng/mL   CK-MB   Result Value Ref Range    CPK MB 1.0 0.1 - 6.5 ng/mL   CK   Result Value Ref Range    CPK 69 20 - 180 U/L   ISTAT CREATININE   Result Value Ref Range    POC Creatinine 0.8 0.5 - 1.4 mg/dL    Sample VENOUS      CT Abdomen Pelvis With Contrast   Final Result      X-Ray Chest AP Portable   Final Result          Imaging Results          CT Abdomen Pelvis With Contrast (Final result)  Result time 03/02/22 16:17:21    Final result by Jose Tobias MD (03/02/22 16:17:21)                 Narrative:    CMS MANDATED QUALITY DATA - CT RADIATION - 436    All CT scans at this facility utilize dose modulation, iterative reconstruction, and/or weight based dosing when appropriate to reduce radiation dose to as low as reasonably achievable.        Reason: Abdominal abscess/infection suspected; Abdominal pain    TECHNIQUE: CT abdomen and pelvis with 100 mL Omnipaque 350.    COMPARISON: CT abdomen pelvis September 6, 2014.    FINDINGS:    Subsegmental atelectasis versus scarring of the lung bases noted. Heart  size is normal.    The liver is normal size. No hepatic lesions observed. The gallbladder has been removed. There is postsurgical dilatation of the common bile duct. There is fatty atrophy of the pancreas. No pancreatic lesions observed. The spleen and adrenal glands are unremarkable. Kidneys are normal size. There is no hydronephrosis or nephrolithiasis. Small simple cyst of the left kidney noted. No renal lesions. The ureters are normal caliber with no obstruction. Bladder is fluid-filled with no focal wall abnormalities. The uterus is atrophic and anteverted. A calcified uterine fibroid is noted at the fundus. The ovaries are atrophic. There is no free fluid in the pelvis. Pelvic phleboliths are noted.    Large and small bowel are normal caliber. There is no bowel wall thickening or inflammatory changes. The appendix is normal. The stomach is distended and grossly unremarkable.    The abdominal aorta is normal caliber with atherosclerosis. There is no intra-abdominal lymphadenopathy. No mesenteric fat stranding or free fluid. Ventral abdominal wall is unremarkable. Degenerative changes of the spine noted. There are no acute osseous abnormalities.    IMPRESSION:    1.  No acute intra-abdominal abnormality.  2.  Incidental observations as described.    Electronically signed by:  Jose Tobias DO  3/2/2022 4:17 PM CST Workstation: [id number]                             X-Ray Chest AP Portable (Final result)  Result time 03/02/22 15:27:25    Final result by Sami Argueta MD (03/02/22 15:27:25)                 Narrative:    XR CHEST 1 VIEW    CLINICAL HISTORY:  88 years Female Weakness    COMPARISON: February 21, 2020    FINDINGS: Cardiac silhouette size is within normal limits. Atherosclerotic calcification of the aorta. No airspace consolidation. No pleural effusion or pneumothorax. No acute osseous abnormality.    IMPRESSION: No acute pulmonary pathology.    Electronically signed by:  Sami Argueta MD   3/2/2022 3:27 PM [facility] Workstation: [id number]                               Medications   ondansetron injection 4 mg (4 mg Intravenous Given 3/2/22 1514)   hyoscyamine injection 0.125 mg (0.125 mg Intravenous Given 3/2/22 1515)   sodium chloride 0.9% bolus 500 mL (0 mLs Intravenous Stopped 3/2/22 1700)   iohexoL (OMNIPAQUE 350) injection 100 mL (100 mLs Intravenous Given 3/2/22 1552)     Medical Decision Making:   History:   Old Medical Records: I decided to obtain old medical records.  Initial Assessment:   Care transferred to Dr. Wei  ED Management:  I received the patient sign-out from Dr. Cavanaugh.  I reassessed the patient.  She has a nontender abdomen on my exam.  She is well-appearing nontoxic.  All of her labs are within normal limits.  She is keeping up with the amount that she is losing.  CT scan does not show any acute pathology chest x-ray did without any abnormality.  Troponin is negative, EKG is nonischemic.  I doubt this is acute coronary syndrome.  This is likely viral type gastroenteritis versus food poisoning.  Patient is stable and improved.  She did bring the stool sample and so we can send this down to the lab to be tested.  Patient daughter is with her and is requesting Lomotil and Phenergan to go home with.  I will provide her with this.    I had a detailed discussion with the patient and/or guardian regarding: The historical points, exam findings, and diagnostic results supporting the discharge diagnosis, lab results, pertinent radiology results, and the need for outpatient follow-up, for definitive care with a family practitioner and to return to the emergency department if symptoms worsen or persist or if there are any questions or concerns that arise at home. All questions have been answered in detail. Strict return to Emergency Department precautions have been provided.    A dictation software program was used for this note.  Please expect some simple typographical  errors in this  note.    This patient was seen during the context of the Covid 19 global pandemic where local, state, hospital guidelines, were followed to the best of ability given the circumstances of the pandemic.               ED Course as of 03/02/22 1833   Wed Mar 02, 2022   1635 EKG 3:13 p.m. normal sinus rhythm rate of 82. Artifact present which limits EKG interpretation.  No STEMI.  EKG interpreted independently by me. [JR]   1718 Patient reassess, she says she is feeling better.  She has improved.  Has been able to tolerate p.o..  Plan discharge home. [JR]      ED Course User Index  [JR] Kolby Gibbs DO             Clinical Impression:   Final diagnoses:  [R53.1] Weakness  [R11.2] Non-intractable vomiting with nausea, unspecified vomiting type (Primary)  [R19.7] Diarrhea, unspecified type          ED Disposition Condition    Discharge Stable        ED Prescriptions     Medication Sig Dispense Start Date End Date Auth. Provider    promethazine (PHENERGAN) 25 MG tablet Take 1 tablet (25 mg total) by mouth every 6 (six) hours as needed for Nausea. 15 tablet 3/2/2022  Kolby Gibbs DO    diphenoxylate-atropine 2.5-0.025 mg (LOMOTIL) 2.5-0.025 mg per tablet Take 1 tablet by mouth 4 (four) times daily as needed for Diarrhea. 15 tablet 3/2/2022 3/6/2022 Kolby Gibbs DO        Follow-up Information     Follow up With Specialties Details Why Contact Info Additional Information    Vidal Kraus MD Family Medicine, Home Health Services, Hospice Services In 3 days  [address]  [address]  [place] [address]  [phone]       [facility] - Emergency Dept Emergency Medicine  If symptoms worsen [address] [facility] [address]  [phone] 1st floor           Kolby Gibbs DO  03/02/22 183

## 2022-03-04 LAB
STOOL CULTURE: NORMAL
STOOL CULTURE: NORMAL

## 2022-03-07 ENCOUNTER — PATIENT MESSAGE (OUTPATIENT)
Dept: FAMILY MEDICINE | Facility: CLINIC | Age: 87
End: 2022-03-07
Payer: MEDICARE

## 2022-03-08 ENCOUNTER — PATIENT MESSAGE (OUTPATIENT)
Dept: FAMILY MEDICINE | Facility: CLINIC | Age: 87
End: 2022-03-08
Payer: MEDICARE

## 2022-03-08 LAB
O+P SPEC MICRO: NORMAL
O+P STL CONC: NORMAL

## 2022-03-14 DIAGNOSIS — E78.5 HYPERLIPIDEMIA, UNSPECIFIED HYPERLIPIDEMIA TYPE: ICD-10-CM

## 2022-03-14 DIAGNOSIS — I25.118 ATHEROSCLEROTIC HEART DISEASE OF NATIVE CORONARY ARTERY WITH OTHER FORMS OF ANGINA PECTORIS: ICD-10-CM

## 2022-03-14 DIAGNOSIS — K21.9 GASTROESOPHAGEAL REFLUX DISEASE WITHOUT ESOPHAGITIS: ICD-10-CM

## 2022-03-14 DIAGNOSIS — E03.9 HYPOTHYROIDISM, UNSPECIFIED TYPE: ICD-10-CM

## 2022-03-14 DIAGNOSIS — I63.81 STROKE OF RIGHT BASAL GANGLIA: ICD-10-CM

## 2022-03-14 DIAGNOSIS — F41.9 ANXIETY DISORDER, UNSPECIFIED TYPE: ICD-10-CM

## 2022-03-14 RX ORDER — LEVOTHYROXINE SODIUM 75 UG/1
75 TABLET ORAL
Qty: 90 TABLET | Refills: 1 | Status: SHIPPED | OUTPATIENT
Start: 2022-03-14 | End: 2022-03-14 | Stop reason: SDUPTHER

## 2022-03-14 RX ORDER — AMITRIPTYLINE HYDROCHLORIDE 10 MG/1
20 TABLET, FILM COATED ORAL NIGHTLY PRN
Qty: 180 TABLET | Refills: 1 | Status: SHIPPED | OUTPATIENT
Start: 2022-03-14 | End: 2022-08-31 | Stop reason: SDUPTHER

## 2022-03-14 RX ORDER — CARVEDILOL 3.12 MG/1
3.12 TABLET ORAL 2 TIMES DAILY
Qty: 180 TABLET | Refills: 1 | Status: SHIPPED | OUTPATIENT
Start: 2022-03-14 | End: 2022-08-27 | Stop reason: SDUPTHER

## 2022-03-14 RX ORDER — AMITRIPTYLINE HYDROCHLORIDE 10 MG/1
20 TABLET, FILM COATED ORAL NIGHTLY PRN
Qty: 180 TABLET | Refills: 1 | Status: SHIPPED | OUTPATIENT
Start: 2022-03-14 | End: 2022-03-14 | Stop reason: SDUPTHER

## 2022-03-14 RX ORDER — DONEPEZIL HYDROCHLORIDE 5 MG/1
5 TABLET, FILM COATED ORAL NIGHTLY
Qty: 30 TABLET | Refills: 5 | Status: SHIPPED | OUTPATIENT
Start: 2022-03-14 | End: 2022-03-14 | Stop reason: SDUPTHER

## 2022-03-14 RX ORDER — ASPIRIN AND DIPYRIDAMOLE 25; 200 MG/1; MG/1
CAPSULE, EXTENDED RELEASE ORAL
Qty: 180 CAPSULE | Refills: 1 | Status: SHIPPED | OUTPATIENT
Start: 2022-03-14 | End: 2022-03-14 | Stop reason: SDUPTHER

## 2022-03-14 RX ORDER — FAMOTIDINE 20 MG/1
20 TABLET, FILM COATED ORAL NIGHTLY PRN
Qty: 90 TABLET | Refills: 1 | Status: SHIPPED | OUTPATIENT
Start: 2022-03-14 | End: 2022-08-27 | Stop reason: SDUPTHER

## 2022-03-14 RX ORDER — ATORVASTATIN CALCIUM 40 MG/1
40 TABLET, FILM COATED ORAL DAILY
Qty: 90 TABLET | Refills: 1 | Status: SHIPPED | OUTPATIENT
Start: 2022-03-14 | End: 2022-08-27 | Stop reason: SDUPTHER

## 2022-03-14 RX ORDER — PANTOPRAZOLE SODIUM 40 MG/1
40 TABLET, DELAYED RELEASE ORAL DAILY
Qty: 90 TABLET | Refills: 1 | Status: SHIPPED | OUTPATIENT
Start: 2022-03-14 | End: 2022-08-31 | Stop reason: SDUPTHER

## 2022-03-14 RX ORDER — ATORVASTATIN CALCIUM 40 MG/1
40 TABLET, FILM COATED ORAL DAILY
Qty: 90 TABLET | Refills: 1 | Status: SHIPPED | OUTPATIENT
Start: 2022-03-14 | End: 2022-03-14 | Stop reason: SDUPTHER

## 2022-03-14 RX ORDER — FAMOTIDINE 20 MG/1
20 TABLET, FILM COATED ORAL NIGHTLY PRN
Qty: 90 TABLET | Refills: 1 | Status: SHIPPED | OUTPATIENT
Start: 2022-03-14 | End: 2022-03-14 | Stop reason: SDUPTHER

## 2022-03-14 RX ORDER — FLUOXETINE 10 MG/1
10 CAPSULE ORAL DAILY
Qty: 90 CAPSULE | Refills: 1 | Status: SHIPPED | OUTPATIENT
Start: 2022-03-14 | End: 2023-02-20

## 2022-03-14 RX ORDER — DONEPEZIL HYDROCHLORIDE 5 MG/1
5 TABLET, FILM COATED ORAL NIGHTLY
Qty: 30 TABLET | Refills: 5 | Status: SHIPPED | OUTPATIENT
Start: 2022-03-14 | End: 2023-02-20

## 2022-03-14 RX ORDER — LEVOTHYROXINE SODIUM 75 UG/1
75 TABLET ORAL
Qty: 90 TABLET | Refills: 1 | Status: SHIPPED | OUTPATIENT
Start: 2022-03-14 | End: 2022-08-27 | Stop reason: SDUPTHER

## 2022-03-14 RX ORDER — ASPIRIN AND DIPYRIDAMOLE 25; 200 MG/1; MG/1
CAPSULE, EXTENDED RELEASE ORAL
Qty: 180 CAPSULE | Refills: 1 | Status: SHIPPED | OUTPATIENT
Start: 2022-03-14 | End: 2022-06-08 | Stop reason: SDUPTHER

## 2022-03-14 RX ORDER — PANTOPRAZOLE SODIUM 40 MG/1
40 TABLET, DELAYED RELEASE ORAL DAILY
Qty: 90 TABLET | Refills: 1 | Status: SHIPPED | OUTPATIENT
Start: 2022-03-14 | End: 2022-03-14 | Stop reason: SDUPTHER

## 2022-03-14 RX ORDER — CARVEDILOL 3.12 MG/1
3.12 TABLET ORAL 2 TIMES DAILY
Qty: 180 TABLET | Refills: 1 | Status: SHIPPED | OUTPATIENT
Start: 2022-03-14 | End: 2022-03-14 | Stop reason: SDUPTHER

## 2022-03-22 ENCOUNTER — LAB VISIT (OUTPATIENT)
Dept: LAB | Facility: HOSPITAL | Age: 87
End: 2022-03-22
Attending: INTERNAL MEDICINE
Payer: MEDICARE

## 2022-03-22 DIAGNOSIS — M81.0 SENILE OSTEOPOROSIS: Primary | ICD-10-CM

## 2022-03-22 LAB — CALCIUM SERPL-MCNC: 8.2 MG/DL (ref 8.7–10.5)

## 2022-03-22 PROCEDURE — 36415 COLL VENOUS BLD VENIPUNCTURE: CPT | Performed by: INTERNAL MEDICINE

## 2022-03-22 PROCEDURE — 82310 ASSAY OF CALCIUM: CPT | Performed by: INTERNAL MEDICINE

## 2022-04-12 ENCOUNTER — LAB VISIT (OUTPATIENT)
Dept: LAB | Facility: HOSPITAL | Age: 87
End: 2022-04-12
Attending: INTERNAL MEDICINE
Payer: MEDICARE

## 2022-04-12 DIAGNOSIS — Z79.899 ENCOUNTER FOR LONG-TERM (CURRENT) USE OF OTHER MEDICATIONS: Primary | ICD-10-CM

## 2022-04-12 DIAGNOSIS — M81.0 SENILE OSTEOPOROSIS: ICD-10-CM

## 2022-04-12 LAB — CALCIUM SERPL-MCNC: 8.5 MG/DL (ref 8.7–10.5)

## 2022-04-12 PROCEDURE — 82310 ASSAY OF CALCIUM: CPT | Performed by: INTERNAL MEDICINE

## 2022-04-12 PROCEDURE — 36415 COLL VENOUS BLD VENIPUNCTURE: CPT | Performed by: INTERNAL MEDICINE

## 2022-04-21 ENCOUNTER — PATIENT MESSAGE (OUTPATIENT)
Dept: FAMILY MEDICINE | Facility: CLINIC | Age: 87
End: 2022-04-21

## 2022-04-21 DIAGNOSIS — Z12.31 OTHER SCREENING MAMMOGRAM: Primary | ICD-10-CM

## 2022-05-06 ENCOUNTER — LAB VISIT (OUTPATIENT)
Dept: LAB | Facility: HOSPITAL | Age: 87
End: 2022-05-06
Attending: INTERNAL MEDICINE
Payer: MEDICARE

## 2022-05-06 DIAGNOSIS — E83.51 HYPOCALCEMIA: Primary | ICD-10-CM

## 2022-05-06 LAB
25(OH)D3+25(OH)D2 SERPL-MCNC: 47 NG/ML (ref 30–96)
CA-I BLDV-SCNC: 1.2 MMOL/L (ref 1.06–1.42)
CALCIUM SERPL-MCNC: 9.1 MG/DL (ref 8.7–10.5)

## 2022-05-06 PROCEDURE — 36415 COLL VENOUS BLD VENIPUNCTURE: CPT | Performed by: INTERNAL MEDICINE

## 2022-05-06 PROCEDURE — 82306 VITAMIN D 25 HYDROXY: CPT | Performed by: INTERNAL MEDICINE

## 2022-05-06 PROCEDURE — 82310 ASSAY OF CALCIUM: CPT | Performed by: INTERNAL MEDICINE

## 2022-05-06 PROCEDURE — 82330 ASSAY OF CALCIUM: CPT | Performed by: INTERNAL MEDICINE

## 2022-05-10 ENCOUNTER — OFFICE VISIT (OUTPATIENT)
Dept: CARDIOLOGY | Facility: CLINIC | Age: 87
End: 2022-05-10
Payer: MEDICARE

## 2022-05-10 VITALS
WEIGHT: 140.44 LBS | DIASTOLIC BLOOD PRESSURE: 57 MMHG | HEART RATE: 65 BPM | HEIGHT: 62 IN | BODY MASS INDEX: 25.84 KG/M2 | SYSTOLIC BLOOD PRESSURE: 116 MMHG

## 2022-05-10 DIAGNOSIS — I25.118 ATHEROSCLEROTIC HEART DISEASE OF NATIVE CORONARY ARTERY WITH OTHER FORMS OF ANGINA PECTORIS: Primary | Chronic | ICD-10-CM

## 2022-05-10 DIAGNOSIS — I65.23 CAROTID ARTERY PLAQUE, BILATERAL: Chronic | ICD-10-CM

## 2022-05-10 DIAGNOSIS — R94.31 NONSPECIFIC ABNORMAL ELECTROCARDIOGRAM (ECG) (EKG): Chronic | ICD-10-CM

## 2022-05-10 DIAGNOSIS — I35.1 NONRHEUMATIC AORTIC VALVE INSUFFICIENCY: Chronic | ICD-10-CM

## 2022-05-10 DIAGNOSIS — I10 ESSENTIAL HYPERTENSION: Chronic | ICD-10-CM

## 2022-05-10 DIAGNOSIS — Z95.5 STENTED CORONARY ARTERY: Chronic | ICD-10-CM

## 2022-05-10 DIAGNOSIS — E78.00 PURE HYPERCHOLESTEROLEMIA: Chronic | ICD-10-CM

## 2022-05-10 PROCEDURE — 99214 OFFICE O/P EST MOD 30 MIN: CPT | Mod: PBBFAC,PO | Performed by: INTERNAL MEDICINE

## 2022-05-10 PROCEDURE — 99999 PR PBB SHADOW E&M-EST. PATIENT-LVL IV: CPT | Mod: PBBFAC,,, | Performed by: INTERNAL MEDICINE

## 2022-05-10 PROCEDURE — 99214 PR OFFICE/OUTPT VISIT, EST, LEVL IV, 30-39 MIN: ICD-10-PCS | Mod: S$PBB,,, | Performed by: INTERNAL MEDICINE

## 2022-05-10 PROCEDURE — 99999 PR PBB SHADOW E&M-EST. PATIENT-LVL IV: ICD-10-PCS | Mod: PBBFAC,,, | Performed by: INTERNAL MEDICINE

## 2022-05-10 PROCEDURE — 99214 OFFICE O/P EST MOD 30 MIN: CPT | Mod: S$PBB,,, | Performed by: INTERNAL MEDICINE

## 2022-05-10 NOTE — PROGRESS NOTES
Subjective:    Patient ID:  Mere Hoffmann is a 89 y.o. female who presents for Atherosclerotic heart disease of native coronary artery wit and Coronary Artery Disease        Coronary Artery Disease  Presents for initial visit. The disease course has been stable. Pertinent negatives include no chest pain, dizziness, leg swelling, palpitations or shortness of breath. Risk factors include hyperlipidemia. Past treatments include statins. Compliance with prior treatments has been variable. Past surgical history includes angioplasty and cardiac stents.   Hypertension  This is a chronic problem. Pertinent negatives include no blurred vision, chest pain, malaise/fatigue, orthopnea, palpitations, PND or shortness of breath. Risk factors for coronary artery disease include post-menopausal state, dyslipidemia and family history. Past treatments include beta blockers.   Hyperlipidemia  This is a chronic problem. Pertinent negatives include no chest pain, focal weakness or shortness of breath. Current antihyperlipidemic treatment includes statins. There are no compliance problems.      NP EVALUATION WAS FOLLOWED BY DR PHELAN AND DR TALLEY, H/O PCI STENT 2012, PROMUS OSTIAL PROX RCA, 2 STENTS,ECHO MILD AI, MILD CAROTID PLAQUE BILATERALLY, LDL 72, ALL RECORDS REVIEWED , HISTORY MOSTLY FROM THE DAUGHTER, SE ROS    Past Medical History:   Diagnosis Date    Hyperlipidemia     Hypertension      Past Surgical History:   Procedure Laterality Date    CARDIAC CATHETERIZATION      CHOLECYSTECTOMY      CORONARY ANGIOPLASTY      moh's surgery  2019    basal cell ca     Family History   Problem Relation Age of Onset    Heart disease Mother      Social History     Socioeconomic History    Marital status:    Tobacco Use    Smoking status: Former Smoker    Smokeless tobacco: Never Used   Substance and Sexual Activity    Alcohol use: Not Currently    Drug use: Never       Review of patient's allergies indicates:    Allergen Reactions    Penicillins     Sulfa (sulfonamide antibiotics)        Current Outpatient Medications:     amitriptyline (ELAVIL) 10 MG tablet, Take 2 tablets (20 mg total) by mouth nightly as needed for Insomnia., Disp: 180 tablet, Rfl: 1    ascorbate calcium (NEFTALI-C ORAL), Take 0.5 tablets by mouth once daily., Disp: , Rfl:     atorvastatin (LIPITOR) 40 MG tablet, Take 1 tablet (40 mg total) by mouth once daily., Disp: 90 tablet, Rfl: 1    carvediloL (COREG) 3.125 MG tablet, Take 1 tablet (3.125 mg total) by mouth 2 (two) times daily., Disp: 180 tablet, Rfl: 1    cholecalciferol, vitamin D3, (VITAMIN D3) 50 mcg (2,000 unit) Cap, Take 1 capsule by mouth once daily., Disp: , Rfl:     clobetasoL (TEMOVATE) 0.05 % external solution, Apply to scalp 1-2x/day prn itching/scaling (Patient taking differently: Apply 1 application topically 2 (two) times daily. Apply to scalp 1-2x/day prn itching/scaling), Disp: 50 mL, Rfl: 11    cyanocobalamin, vitamin B-12, 1,000 mcg TbSR, Take 1 tablet by mouth once daily., Disp: , Rfl:     dipyridamole-aspirin 200-25 mg (AGGRENOX)  mg CM12, TK 1 C PO BID, Disp: 180 capsule, Rfl: 1    donepeziL (ARICEPT) 5 MG tablet, Take 1 tablet (5 mg total) by mouth every evening., Disp: 30 tablet, Rfl: 5    famotidine (PEPCID) 20 MG tablet, Take 1 tablet (20 mg total) by mouth nightly as needed for Heartburn., Disp: 90 tablet, Rfl: 1    FLUoxetine 10 MG capsule, Take 1 capsule (10 mg total) by mouth once daily., Disp: 90 capsule, Rfl: 1    multivitamin capsule, Take 1 capsule by mouth once daily., Disp: , Rfl:     nitroGLYCERIN (NITROSTAT) 0.3 MG SL tablet, Place 0.3 mg under the tongue every 5 (five) minutes as needed for Chest pain., Disp: , Rfl:     pantoprazole (PROTONIX) 40 MG tablet, Take 1 tablet (40 mg total) by mouth once daily., Disp: 90 tablet, Rfl: 1    promethazine (PHENERGAN) 25 MG tablet, Take 1 tablet (25 mg total) by mouth every 6 (six) hours as needed  "for Nausea., Disp: 15 tablet, Rfl: 0    UNITHROID 75 mcg tablet, Take 1 tablet (75 mcg total) by mouth before breakfast., Disp: 90 tablet, Rfl: 1    Review of Systems   Constitutional: Negative for chills, diaphoresis, fever, malaise/fatigue and night sweats.   HENT: Negative for congestion and nosebleeds.    Eyes: Negative for blurred vision and visual disturbance.   Cardiovascular: Negative for chest pain, claudication, cyanosis, dyspnea on exertion (MILD), irregular heartbeat, leg swelling, near-syncope, orthopnea, palpitations, paroxysmal nocturnal dyspnea and syncope.   Respiratory: Negative for cough, hemoptysis, shortness of breath, sleep disturbances due to breathing and wheezing.    Endocrine: Negative for cold intolerance, heat intolerance and polyuria.   Hematologic/Lymphatic: Negative for adenopathy. Does not bruise/bleed easily.   Skin: Negative for color change, itching and nail changes.   Musculoskeletal: Negative for back pain, falls and joint pain.   Gastrointestinal: Negative for abdominal pain, change in bowel habit, dysphagia, heartburn, hematemesis, jaundice, melena, nausea and vomiting.   Genitourinary: Negative for dysuria and flank pain.   Neurological: Negative for brief paralysis, dizziness, focal weakness, light-headedness, loss of balance, numbness, paresthesias, seizures, sensory change and tremors.   Psychiatric/Behavioral: Positive for memory loss. Negative for altered mental status and depression. The patient is not nervous/anxious.    Allergic/Immunologic: Negative for environmental allergies and persistent infections.        Objective:      Vitals:    05/10/22 1402   BP: (!) 116/57   Pulse: 65   Weight: 63.7 kg (140 lb 6.9 oz)   Height: 5' 2" (1.575 m)   PainSc: 0-No pain     Body mass index is 25.69 kg/m².    Physical Exam  Constitutional:       Appearance: Normal appearance.   Eyes:      General: No scleral icterus.     Extraocular Movements: Extraocular movements intact.      " Pupils: Pupils are equal, round, and reactive to light.   Neck:      Vascular: No carotid bruit.   Cardiovascular:      Rate and Rhythm: Normal rate and regular rhythm.  No extrasystoles are present.     Pulses:           Carotid pulses are 2+ on the right side and 2+ on the left side.       Radial pulses are 2+ on the right side and 2+ on the left side.        Posterior tibial pulses are 2+ on the right side and 2+ on the left side.      Heart sounds: Murmur heard.    Decrescendo early diastolic murmur is present at the upper right sternal border.    No friction rub. No gallop.   Pulmonary:      Effort: Pulmonary effort is normal.      Breath sounds: Normal breath sounds. No rales.   Abdominal:      Palpations: Abdomen is soft.      Tenderness: There is no abdominal tenderness.   Musculoskeletal:      Cervical back: Neck supple.      Right lower leg: No edema.      Left lower leg: No edema.   Skin:     Capillary Refill: Capillary refill takes less than 2 seconds.   Neurological:      General: No focal deficit present.      Mental Status: She is alert and oriented to person, place, and time.   Psychiatric:         Mood and Affect: Mood normal.         Behavior: Behavior normal.                 ..    Chemistry        Component Value Date/Time     03/02/2022 1511    K 3.5 03/02/2022 1511     03/02/2022 1511    CO2 20 (L) 03/02/2022 1511    BUN 18 03/02/2022 1511    CREATININE 1.0 03/02/2022 1511     (H) 03/02/2022 1511        Component Value Date/Time    CALCIUM 9.1 05/06/2022 1101    ALKPHOS 84 03/02/2022 1511    AST 20 03/02/2022 1511    ALT 20 03/02/2022 1511    BILITOT 0.9 03/02/2022 1511    ESTGFRAFRICA 58.1 (A) 03/02/2022 1511    EGFRNONAA 50.4 (A) 03/02/2022 1511            ..  Lab Results   Component Value Date    CHOL 145 02/17/2022    CHOL 151 08/09/2021    CHOL 131 01/12/2021     Lab Results   Component Value Date    HDL 48 02/17/2022    HDL 44 08/09/2021    HDL 43 (L) 01/12/2021     Lab  Results   Component Value Date    LDLCALC 72.6 02/17/2022    LDLCALC 78.2 08/09/2021    LDLCALC 66 01/12/2021     Lab Results   Component Value Date    TRIG 122 02/17/2022    TRIG 144 08/09/2021    TRIG 141 01/12/2021     Lab Results   Component Value Date    CHOLHDL 33.1 02/17/2022    CHOLHDL 29.1 08/09/2021    CHOLHDL 3.0 01/12/2021     ..  Lab Results   Component Value Date    WBC 8.24 03/02/2022    HGB 13.2 03/02/2022    HCT 41.7 03/02/2022    MCV 93 03/02/2022     03/02/2022       Test(s) Reviewed  I have reviewed the following in detail:  [] Stress test   [] Angiography   [x] Echocardiogram   [x] Labs   [x] Other:       Assessment:         ICD-10-CM ICD-9-CM   1. Atherosclerotic heart disease of native coronary artery with other forms of angina pectoris  I25.118 414.01     413.9   2. Nonrheumatic aortic valve insufficiency  I35.1 424.1   3. Carotid artery plaque, bilateral  I65.23 433.10     433.30   4. Pure hypercholesterolemia  E78.00 272.0   5. Essential hypertension  I10 401.9   6. Stented coronary artery  Z95.5 V45.82   7. Nonspecific abnormal electrocardiogram (ECG) (EKG)  R94.31 794.31     Problem List Items Addressed This Visit        Cardiac/Vascular    Atherosclerotic heart disease of native coronary artery with other forms of angina pectoris - Primary    Relevant Orders    Nuclear Stress - Cardiology Interpreted    Essential hypertension    Hyperlipidemia, unspecified    Nonrheumatic aortic valve insufficiency    Carotid artery plaque, bilateral    Stented coronary artery    Relevant Orders    Nuclear Stress - Cardiology Interpreted      Other Visit Diagnoses     Nonspecific abnormal electrocardiogram (ECG) (EKG)  (Chronic)       Relevant Orders    Nuclear Stress - Cardiology Interpreted           Plan:         EKG NS STT CHANGES, DOING WELL, CHECK NUCLEAR STRESS TEST TO ASSESS  FOR ISCHEMIA,ALL CV CLINICALLY STABLE, MILD EQUIVALENT ANGINA, NO HF, NO TIA, NO CLINICAL ARRHYTHMIA,CONTINUE  CURRENT MEDS, EDUCATION, DIET, EXERCISE, DISCUSSED PLAN WITH THE PATIENT AND HER DAUGHTER, RETURN TO CLINIC IN 6 MO  Atherosclerotic heart disease of native coronary artery with other forms of angina pectoris  -     Nuclear Stress - Cardiology Interpreted; Future    Nonrheumatic aortic valve insufficiency    Carotid artery plaque, bilateral  Comments:  NO TIA    Pure hypercholesterolemia  Comments:  DIET AND MEDS    Essential hypertension  Comments:  CONTROLLED    Stented coronary artery  -     Nuclear Stress - Cardiology Interpreted; Future    Nonspecific abnormal electrocardiogram (ECG) (EKG)  -     Nuclear Stress - Cardiology Interpreted; Future    RTC Low level/low impact aerobic exercise 5x's/wk. Heart healthy diet and risk factor modification.    See labs and med orders.    Aerobic exercise 5x's/wk. Heart healthy diet and risk factor modification.    See labs and med orders.

## 2022-05-17 ENCOUNTER — INFUSION (OUTPATIENT)
Dept: INFUSION THERAPY | Facility: HOSPITAL | Age: 87
End: 2022-05-17
Attending: INTERNAL MEDICINE
Payer: MEDICARE

## 2022-05-17 VITALS
RESPIRATION RATE: 18 BRPM | WEIGHT: 142.13 LBS | TEMPERATURE: 97 F | SYSTOLIC BLOOD PRESSURE: 145 MMHG | BODY MASS INDEX: 26.16 KG/M2 | DIASTOLIC BLOOD PRESSURE: 70 MMHG | HEART RATE: 72 BPM | HEIGHT: 62 IN | OXYGEN SATURATION: 96 %

## 2022-05-17 DIAGNOSIS — M81.0 AGE-RELATED OSTEOPOROSIS WITHOUT CURRENT PATHOLOGICAL FRACTURE: Primary | ICD-10-CM

## 2022-05-17 PROCEDURE — 96372 THER/PROPH/DIAG INJ SC/IM: CPT

## 2022-05-17 PROCEDURE — 63600175 PHARM REV CODE 636 W HCPCS: Mod: JG | Performed by: INTERNAL MEDICINE

## 2022-05-17 RX ADMIN — DENOSUMAB 60 MG: 60 INJECTION SUBCUTANEOUS at 03:05

## 2022-05-17 NOTE — PLAN OF CARE
Problem: Fatigue  Goal: Improved Activity Tolerance  Intervention: Promote Improved Energy  Flowsheets (Taken 5/17/2022 1501)  Fatigue Management:   fatigue-related activity identified   frequent rest breaks encouraged

## 2022-06-07 ENCOUNTER — PATIENT MESSAGE (OUTPATIENT)
Dept: FAMILY MEDICINE | Facility: CLINIC | Age: 87
End: 2022-06-07

## 2022-06-07 DIAGNOSIS — I63.81 STROKE OF RIGHT BASAL GANGLIA: ICD-10-CM

## 2022-06-07 RX ORDER — ASPIRIN AND DIPYRIDAMOLE 25; 200 MG/1; MG/1
CAPSULE, EXTENDED RELEASE ORAL
Qty: 180 CAPSULE | Refills: 1 | Status: CANCELLED | OUTPATIENT
Start: 2022-06-07

## 2022-06-08 RX ORDER — ASPIRIN AND DIPYRIDAMOLE 25; 200 MG/1; MG/1
CAPSULE, EXTENDED RELEASE ORAL
Qty: 180 CAPSULE | Refills: 1 | Status: SHIPPED | OUTPATIENT
Start: 2022-06-08 | End: 2022-08-27 | Stop reason: SDUPTHER

## 2022-06-24 ENCOUNTER — PATIENT MESSAGE (OUTPATIENT)
Dept: CARDIOLOGY | Facility: HOSPITAL | Age: 87
End: 2022-06-24
Payer: MEDICARE

## 2022-06-27 ENCOUNTER — HOSPITAL ENCOUNTER (OUTPATIENT)
Dept: RADIOLOGY | Facility: HOSPITAL | Age: 87
Discharge: HOME | End: 2022-06-27
Attending: INTERNAL MEDICINE
Payer: MEDICARE

## 2022-06-27 ENCOUNTER — CLINICAL SUPPORT (OUTPATIENT)
Dept: CARDIOLOGY | Facility: HOSPITAL | Age: 87
End: 2022-06-27
Attending: INTERNAL MEDICINE
Payer: MEDICARE

## 2022-06-27 VITALS — WEIGHT: 142 LBS | HEIGHT: 62 IN | BODY MASS INDEX: 26.13 KG/M2

## 2022-06-27 DIAGNOSIS — I25.118 ATHEROSCLEROTIC HEART DISEASE OF NATIVE CORONARY ARTERY WITH OTHER FORMS OF ANGINA PECTORIS: Chronic | ICD-10-CM

## 2022-06-27 DIAGNOSIS — Z95.5 STENTED CORONARY ARTERY: Chronic | ICD-10-CM

## 2022-06-27 DIAGNOSIS — R94.31 NONSPECIFIC ABNORMAL ELECTROCARDIOGRAM (ECG) (EKG): Chronic | ICD-10-CM

## 2022-06-27 LAB
CV PHARM DOSE: 0.4 MG
CV STRESS BASE HR: 57 BPM
DIASTOLIC BLOOD PRESSURE: 59 MMHG
NUC REST EJECTION FRACTION: 77
NUC STRESS EJECTION FRACTION: 69 %
OHS CV CPX 1 MINUTE RECOVERY HEART RATE: 98 BPM
OHS CV CPX 85 PERCENT MAX PREDICTED HEART RATE MALE: 109
OHS CV CPX MAX PREDICTED HEART RATE: 128
OHS CV CPX PATIENT IS FEMALE: 1
OHS CV CPX PATIENT IS MALE: 0
OHS CV CPX PEAK DIASTOLIC BLOOD PRESSURE: 59 MMHG
OHS CV CPX PEAK HEAR RATE: 101 BPM
OHS CV CPX PEAK RATE PRESSURE PRODUCT: NORMAL
OHS CV CPX PEAK SYSTOLIC BLOOD PRESSURE: 149 MMHG
OHS CV CPX PERCENT MAX PREDICTED HEART RATE ACHIEVED: 79
OHS CV CPX RATE PRESSURE PRODUCT PRESENTING: 8493
OHS CV PHARM TIME: 1455 MIN
SYSTOLIC BLOOD PRESSURE: 149 MMHG

## 2022-06-27 PROCEDURE — 93018 CV STRESS TEST I&R ONLY: CPT | Mod: ,,, | Performed by: INTERNAL MEDICINE

## 2022-06-27 PROCEDURE — 63600175 PHARM REV CODE 636 W HCPCS: Mod: PO | Performed by: INTERNAL MEDICINE

## 2022-06-27 PROCEDURE — 78452 STRESS TEST WITH MYOCARDIAL PERFUSION (CUPID ONLY): ICD-10-PCS | Mod: 26,,, | Performed by: INTERNAL MEDICINE

## 2022-06-27 PROCEDURE — 78452 HT MUSCLE IMAGE SPECT MULT: CPT | Mod: 26,,, | Performed by: INTERNAL MEDICINE

## 2022-06-27 PROCEDURE — 93017 CV STRESS TEST TRACING ONLY: CPT | Mod: PO

## 2022-06-27 PROCEDURE — 93016 STRESS TEST WITH MYOCARDIAL PERFUSION (CUPID ONLY): ICD-10-PCS | Mod: ,,, | Performed by: INTERNAL MEDICINE

## 2022-06-27 PROCEDURE — A9502 TC99M TETROFOSMIN: HCPCS | Mod: PO

## 2022-06-27 PROCEDURE — 93018 PR CARDIAC STRESS TST,INTERP/REPT ONLY: ICD-10-PCS | Mod: ,,, | Performed by: INTERNAL MEDICINE

## 2022-06-27 PROCEDURE — 93016 CV STRESS TEST SUPVJ ONLY: CPT | Mod: ,,, | Performed by: INTERNAL MEDICINE

## 2022-06-27 RX ORDER — REGADENOSON 0.08 MG/ML
0.4 INJECTION, SOLUTION INTRAVENOUS ONCE
Status: COMPLETED | OUTPATIENT
Start: 2022-06-27 | End: 2022-06-27

## 2022-06-27 RX ADMIN — REGADENOSON 0.4 MG: 0.08 INJECTION, SOLUTION INTRAVENOUS at 02:06

## 2022-06-28 ENCOUNTER — LAB VISIT (OUTPATIENT)
Dept: LAB | Facility: HOSPITAL | Age: 87
End: 2022-06-28
Attending: INTERNAL MEDICINE
Payer: MEDICARE

## 2022-06-28 DIAGNOSIS — M81.0 SENILE OSTEOPOROSIS: ICD-10-CM

## 2022-06-28 DIAGNOSIS — Z79.899 ENCOUNTER FOR LONG-TERM (CURRENT) USE OF OTHER MEDICATIONS: Primary | ICD-10-CM

## 2022-06-28 LAB
25(OH)D3+25(OH)D2 SERPL-MCNC: 41 NG/ML (ref 30–96)
CALCIUM SERPL-MCNC: 9.2 MG/DL (ref 8.7–10.5)

## 2022-06-28 PROCEDURE — 82310 ASSAY OF CALCIUM: CPT | Performed by: INTERNAL MEDICINE

## 2022-06-28 PROCEDURE — 36415 COLL VENOUS BLD VENIPUNCTURE: CPT | Performed by: INTERNAL MEDICINE

## 2022-06-28 PROCEDURE — 82306 VITAMIN D 25 HYDROXY: CPT | Performed by: INTERNAL MEDICINE

## 2022-08-31 ENCOUNTER — OFFICE VISIT (OUTPATIENT)
Dept: FAMILY MEDICINE | Facility: CLINIC | Age: 87
End: 2022-08-31
Payer: MEDICARE

## 2022-08-31 VITALS
WEIGHT: 138 LBS | HEIGHT: 62 IN | BODY MASS INDEX: 25.4 KG/M2 | SYSTOLIC BLOOD PRESSURE: 136 MMHG | DIASTOLIC BLOOD PRESSURE: 60 MMHG | HEART RATE: 72 BPM

## 2022-08-31 DIAGNOSIS — F01.50 VASCULAR DEMENTIA WITHOUT BEHAVIORAL DISTURBANCE: ICD-10-CM

## 2022-08-31 DIAGNOSIS — I65.23 CAROTID ARTERY PLAQUE, BILATERAL: ICD-10-CM

## 2022-08-31 DIAGNOSIS — G45.9 TIA (TRANSIENT ISCHEMIC ATTACK): Primary | ICD-10-CM

## 2022-08-31 DIAGNOSIS — K21.9 GASTROESOPHAGEAL REFLUX DISEASE WITHOUT ESOPHAGITIS: ICD-10-CM

## 2022-08-31 DIAGNOSIS — Z95.5 STENTED CORONARY ARTERY: ICD-10-CM

## 2022-08-31 DIAGNOSIS — R54 SENILE DEBILITY: ICD-10-CM

## 2022-08-31 DIAGNOSIS — I63.81 STROKE OF RIGHT BASAL GANGLIA: ICD-10-CM

## 2022-08-31 DIAGNOSIS — I25.118 ATHEROSCLEROTIC HEART DISEASE OF NATIVE CORONARY ARTERY WITH OTHER FORMS OF ANGINA PECTORIS: ICD-10-CM

## 2022-08-31 DIAGNOSIS — E78.5 HYPERLIPIDEMIA, UNSPECIFIED HYPERLIPIDEMIA TYPE: ICD-10-CM

## 2022-08-31 DIAGNOSIS — E03.9 HYPOTHYROIDISM, UNSPECIFIED TYPE: ICD-10-CM

## 2022-08-31 DIAGNOSIS — M81.0 AGE-RELATED OSTEOPOROSIS WITHOUT CURRENT PATHOLOGICAL FRACTURE: ICD-10-CM

## 2022-08-31 DIAGNOSIS — R26.9 GAIT DISTURBANCE: ICD-10-CM

## 2022-08-31 DIAGNOSIS — F41.9 ANXIETY DISORDER, UNSPECIFIED TYPE: ICD-10-CM

## 2022-08-31 PROCEDURE — 99214 PR OFFICE/OUTPT VISIT, EST, LEVL IV, 30-39 MIN: ICD-10-PCS | Mod: S$GLB,,, | Performed by: FAMILY MEDICINE

## 2022-08-31 PROCEDURE — 99214 OFFICE O/P EST MOD 30 MIN: CPT | Mod: S$GLB,,, | Performed by: FAMILY MEDICINE

## 2022-08-31 RX ORDER — PANTOPRAZOLE SODIUM 40 MG/1
40 TABLET, DELAYED RELEASE ORAL DAILY
Qty: 90 TABLET | Refills: 1 | Status: SHIPPED | OUTPATIENT
Start: 2022-08-31 | End: 2023-01-17 | Stop reason: SDUPTHER

## 2022-08-31 RX ORDER — LEVOTHYROXINE SODIUM 75 UG/1
75 TABLET ORAL
Qty: 90 TABLET | Refills: 1 | Status: SHIPPED | OUTPATIENT
Start: 2022-08-31

## 2022-08-31 RX ORDER — ATORVASTATIN CALCIUM 40 MG/1
40 TABLET, FILM COATED ORAL DAILY
Qty: 90 TABLET | Refills: 1 | Status: SHIPPED | OUTPATIENT
Start: 2022-08-31 | End: 2023-01-17 | Stop reason: SDUPTHER

## 2022-08-31 RX ORDER — FLUOXETINE 10 MG/1
10 CAPSULE ORAL DAILY
Qty: 90 CAPSULE | Refills: 1 | Status: CANCELLED | OUTPATIENT
Start: 2022-08-31

## 2022-08-31 RX ORDER — AMITRIPTYLINE HYDROCHLORIDE 10 MG/1
20 TABLET, FILM COATED ORAL NIGHTLY PRN
Qty: 180 TABLET | Refills: 1 | Status: SHIPPED | OUTPATIENT
Start: 2022-08-31 | End: 2023-01-17 | Stop reason: SDUPTHER

## 2022-08-31 RX ORDER — FAMOTIDINE 20 MG/1
20 TABLET, FILM COATED ORAL NIGHTLY PRN
Qty: 90 TABLET | Refills: 1 | Status: SHIPPED | OUTPATIENT
Start: 2022-08-31 | End: 2023-01-17 | Stop reason: SDUPTHER

## 2022-08-31 RX ORDER — CARVEDILOL 3.12 MG/1
3.12 TABLET ORAL 2 TIMES DAILY
Qty: 180 TABLET | Refills: 1 | Status: SHIPPED | OUTPATIENT
Start: 2022-08-31 | End: 2023-01-17 | Stop reason: SDUPTHER

## 2022-08-31 RX ORDER — FLUOXETINE HYDROCHLORIDE 20 MG/1
20 CAPSULE ORAL DAILY
Qty: 90 CAPSULE | Refills: 1 | Status: SHIPPED | OUTPATIENT
Start: 2022-08-31 | End: 2023-01-17 | Stop reason: SDUPTHER

## 2022-08-31 NOTE — PROGRESS NOTES
SUBJECTIVE:    Patient ID: Mere Hoffmann is a 89 y.o. female.    Chief Complaint: Follow-up and Medication Refill (No bottles //discuss about depression // abc )     89-year-old female  who had a neurologic episode at the [facility]  Five days ago.  She became clammy started mumbling and not making sense.  Her daughter was a nurse and laid her down on the floor raised her legs and she slowly responded within 5 minutes.  Daughter states the patient has had the TIA type episodes in the past but this the 1st 1 in several months.  After the episode she was able to finish getting her hair done and they went out to lunch.  No lasting neurologic deficits.  Blood pressure cuff was not available at that time.  On Aggrenox 1 tablet b.I.d. for stroke prevention for many months.      History of CVA and vascular dementia.  Has short-term memory loss that is not responding to Aricept.  Daughter would like discontinue this.  History of carotid ultrasound in 2021 which was negative echocardiogram normal in 2021. May of 2022-stress test was normal with no ischemia.  Ejection fraction 77%.      She ambulates slowly with a Rollator at home.      Lab Visit on 06/28/2022   Component Date Value Ref Range Status    Calcium 06/28/2022 9.2  8.7 - 10.5 mg/dL Final    Vit D, 25-Hydroxy 06/28/2022 41  30 - 96 ng/mL Final   Hospital Outpatient Visit on 06/27/2022   Component Date Value Ref Range Status    85% Max Predicted HR 06/27/2022 109   Final    Max Predicted HR 06/27/2022 128   Final    [facility] CV CPX PATIENT IS MALE 06/27/2022 0.0   Final    [facility] CV CPX PATIENT IS FEMALE 06/27/2022 1.0   Final    HR at rest 06/27/2022 57  bpm Final    Systolic blood pressure 06/27/2022 149  mmHg Final    Diastolic blood pressure 06/27/2022 59  mmHg Final    RPP 06/27/2022 8,493   Final    Peak HR 06/27/2022 101.0  bpm Final    Peak Systolic BP 06/27/2022 149  mmHg Final    Peak Diatolic BP 06/27/2022 59  mmHg Final    Peak RPP 06/27/2022 15,049    Final    % Max HR Achieved 06/27/2022 79   Final    1 Minute Recovery HR 06/27/2022 98  bpm Final    dose 06/27/2022 0.4  mg Final    Pharm Time 06/27/2022 1,455  min Final    Nuc Rest EF 06/27/2022 77   Final    Nuc Stress EF 06/27/2022 69  % Final   Lab Visit on 05/06/2022   Component Date Value Ref Range Status    Calcium 05/06/2022 9.1  8.7 - 10.5 mg/dL Final    Ionized Calcium 05/06/2022 1.20  1.06 - 1.42 mmol/L Final    Vit D, 25-Hydroxy 05/06/2022 47  30 - 96 ng/mL Final   Lab Visit on 04/12/2022   Component Date Value Ref Range Status    Calcium 04/12/2022 8.5 (L)  8.7 - 10.5 mg/dL Final   Lab Visit on 03/22/2022   Component Date Value Ref Range Status    Calcium 03/22/2022 8.2 (L)  8.7 - 10.5 mg/dL Final   Admission on 03/02/2022, Discharged on 03/02/2022   Component Date Value Ref Range Status    SARS-CoV-2 RNA, Amplification, Qual 03/02/2022 Negative  Negative Final    C. diff Antigen 03/02/2022 Negative  Negative Final    C difficile Toxins A+B, EIA 03/02/2022 Negative  Negative Final    Occult Blood 03/02/2022 Negative  Negative Final    Stool Culture 03/02/2022 No Salmonella,Shigella,Vibrio,Campylobacter.   Final    Stool Culture 03/02/2022 No E coli 0157:H7 isolated.   Final    Stool Exam-Ova,Cysts,Parasites 03/02/2022 Final report   Final    Stool WBC 03/02/2022 No neutrophils seen  No neutrophils seen Final    aPTT 03/02/2022 23.8  23.3 - 35.1 sec Final    Amylase 03/02/2022 45  20 - 110 U/L Final    WBC 03/02/2022 8.24  3.90 - 12.70 K/uL Final    RBC 03/02/2022 4.51  4.00 - 5.40 M/uL Final    Hemoglobin 03/02/2022 13.2  12.0 - 16.0 g/dL Final    Hematocrit 03/02/2022 41.7  37.0 - 48.5 % Final    MCV 03/02/2022 93  82 - 98 fL Final    MCH 03/02/2022 29.3  27.0 - 31.0 pg Final    MCHC 03/02/2022 31.7 (L)  32.0 - 36.0 g/dL Final    RDW 03/02/2022 13.0  11.5 - 14.5 % Final    Platelets 03/02/2022 188  150 - 450 K/uL Final    MPV 03/02/2022 9.2  9.2 - 12.9 fL Final    Immature Granulocytes 03/02/2022  0.2  0.0 - 0.5 % Final    Gran # (ANC) 03/02/2022 7.2  1.8 - 7.7 K/uL Final    Immature Grans (Abs) 03/02/2022 0.02  0.00 - 0.04 K/uL Final    Lymph # 03/02/2022 0.4 (L)  1.0 - 4.8 K/uL Final    Mono # 03/02/2022 0.5  0.3 - 1.0 K/uL Final    Eos # 03/02/2022 0.1  0.0 - 0.5 K/uL Final    Baso # 03/02/2022 0.02  0.00 - 0.20 K/uL Final    nRBC 03/02/2022 0  0 /100 WBC Final    Gran % 03/02/2022 87.5 (H)  38.0 - 73.0 % Final    Lymph % 03/02/2022 5.2 (L)  18.0 - 48.0 % Final    Mono % 03/02/2022 6.1  4.0 - 15.0 % Final    Eosinophil % 03/02/2022 0.8  0.0 - 8.0 % Final    Basophil % 03/02/2022 0.2  0.0 - 1.9 % Final    Differential Method 03/02/2022 Automated   Final    Sodium 03/02/2022 138  136 - 145 mmol/L Final    Potassium 03/02/2022 3.5  3.5 - 5.1 mmol/L Final    Chloride 03/02/2022 109  95 - 110 mmol/L Final    CO2 03/02/2022 20 (L)  23 - 29 mmol/L Final    Glucose 03/02/2022 138 (H)  70 - 110 mg/dL Final    BUN 03/02/2022 18  8 - 23 mg/dL Final    Creatinine 03/02/2022 1.0  0.5 - 1.4 mg/dL Final    Calcium 03/02/2022 7.3 (L)  8.7 - 10.5 mg/dL Final    Total Protein 03/02/2022 6.9  6.0 - 8.4 g/dL Final    Albumin 03/02/2022 3.6  3.5 - 5.2 g/dL Final    Total Bilirubin 03/02/2022 0.9  0.1 - 1.0 mg/dL Final    Alkaline Phosphatase 03/02/2022 84  55 - 135 U/L Final    AST 03/02/2022 20  10 - 40 U/L Final    ALT 03/02/2022 20  10 - 44 U/L Final    Anion Gap 03/02/2022 9  8 - 16 mmol/L Final    eGFR if African American 03/02/2022 58.1 (A)  >60 mL/min/1.73 m^2 Final    eGFR if non African American 03/02/2022 50.4 (A)  >60 mL/min/1.73 m^2 Final    Lipase 03/02/2022 24  4 - 60 U/L Final    Magnesium 03/02/2022 2.0  1.6 - 2.6 mg/dL Final    PT 03/02/2022 15.1 (H)  11.4 - 13.7 sec Final    INR 03/02/2022 1.3   Final    Troponin I 03/02/2022 <0.030  <=0.040 ng/mL Final    CPK MB 03/02/2022 1.0  0.1 - 6.5 ng/mL Final    CPK 03/02/2022 69  20 - 180 U/L Final    POC Creatinine 03/02/2022 0.8  0.5 - 1.4 mg/dL Final    Sample  03/02/2022 VENOUS   Final    Ova and Parasites, Result 1 03/02/2022 Comment   Final       Past Medical History:   Diagnosis Date    Hyperlipidemia     Hypertension      Social History     Socioeconomic History    Marital status:    Tobacco Use    Smoking status: Former    Smokeless tobacco: Never   Substance and Sexual Activity    Alcohol use: Not Currently    Drug use: Never     Past Surgical History:   Procedure Laterality Date    CARDIAC CATHETERIZATION      CHOLECYSTECTOMY      CORONARY ANGIOPLASTY      moh's surgery  2019    basal cell ca     Family History   Problem Relation Age of Onset    Heart disease Mother        Review of patient's allergies indicates:   Allergen Reactions    Penicillins     Sulfa (sulfonamide antibiotics)        Current Outpatient Medications:     ascorbate calcium (NEFTALI-C ORAL), Take 0.5 tablets by mouth once daily., Disp: , Rfl:     cholecalciferol, vitamin D3, (VITAMIN D3) 50 mcg (2,000 unit) Cap, Take 1 capsule by mouth once daily., Disp: , Rfl:     cyanocobalamin, vitamin B-12, 1,000 mcg TbSR, Take 1 tablet by mouth once daily., Disp: , Rfl:     dipyridamole-aspirin 200-25 mg (AGGRENOX)  mg CM12, TK 1 C PO BID, Disp: 180 capsule, Rfl: 1    donepeziL (ARICEPT) 5 MG tablet, Take 1 tablet (5 mg total) by mouth every evening., Disp: 30 tablet, Rfl: 5    FLUoxetine 10 MG capsule, Take 1 capsule (10 mg total) by mouth once daily., Disp: 90 capsule, Rfl: 1    multivitamin capsule, Take 1 capsule by mouth once daily., Disp: , Rfl:     nitroGLYCERIN (NITROSTAT) 0.3 MG SL tablet, Place 0.3 mg under the tongue every 5 (five) minutes as needed for Chest pain., Disp: , Rfl:     promethazine (PHENERGAN) 25 MG tablet, Take 1 tablet (25 mg total) by mouth every 6 (six) hours as needed for Nausea., Disp: 15 tablet, Rfl: 0    amitriptyline (ELAVIL) 10 MG tablet, Take 2 tablets (20 mg total) by mouth nightly as needed for Insomnia., Disp: 180 tablet, Rfl: 1    atorvastatin (LIPITOR) 40  MG tablet, Take 1 tablet (40 mg total) by mouth once daily., Disp: 90 tablet, Rfl: 1    carvediloL (COREG) 3.125 MG tablet, Take 1 tablet (3.125 mg total) by mouth 2 (two) times daily., Disp: 180 tablet, Rfl: 1    clobetasoL (TEMOVATE) 0.05 % external solution, Apply to scalp 1-2x/day prn itching/scaling (Patient not taking: No sig reported), Disp: 50 mL, Rfl: 11    famotidine (PEPCID) 20 MG tablet, Take 1 tablet (20 mg total) by mouth nightly as needed for Heartburn., Disp: 90 tablet, Rfl: 1    FLUoxetine 20 MG capsule, Take 1 capsule (20 mg total) by mouth once daily., Disp: 90 capsule, Rfl: 1    pantoprazole (PROTONIX) 40 MG tablet, Take 1 tablet (40 mg total) by mouth once daily., Disp: 90 tablet, Rfl: 1    UNITHROID 75 mcg tablet, Take 1 tablet (75 mcg total) by mouth before breakfast., Disp: 90 tablet, Rfl: 1    Review of Systems   Constitutional:  Negative for appetite change, chills, fatigue, fever and unexpected weight change.   HENT:  Negative for congestion, ear pain, sinus pain, sore throat and trouble swallowing.    Eyes:  Negative for pain, discharge and visual disturbance.   Respiratory:  Negative for apnea, cough, shortness of breath and wheezing.    Cardiovascular:  Negative for chest pain, palpitations and leg swelling.   Gastrointestinal:  Negative for abdominal pain, blood in stool, constipation, diarrhea, nausea and vomiting.   Endocrine: Negative for heat intolerance, polydipsia and polyuria.   Genitourinary:  Negative for difficulty urinating, dyspareunia, dysuria, frequency, hematuria and menstrual problem.   Musculoskeletal:  Positive for arthralgias and back pain. Negative for gait problem, joint swelling and myalgias.   Allergic/Immunologic: Negative for environmental allergies, food allergies and immunocompromised state.   Neurological:  Negative for dizziness, tremors, seizures, numbness and headaches.   Psychiatric/Behavioral:  Negative for behavioral problems, confusion, hallucinations  "and suicidal ideas. The patient is not nervous/anxious.         Objective:      Vitals:    08/31/22 1436   BP: 136/60   Pulse: 72   Weight: 62.6 kg (138 lb)   Height: 5' 2" (1.575 m)     Physical Exam  Vitals and nursing note reviewed.   Constitutional:       General: She is not in acute distress.     Appearance: Normal appearance. She is well-developed. She is not toxic-appearing.   HENT:      Head: Normocephalic and atraumatic.      Right Ear: External ear normal.      Left Ear: External ear normal.      Nose: Nose normal.   Eyes:      Pupils: Pupils are equal, round, and reactive to light.   Neck:      Thyroid: No thyromegaly.      Vascular: No carotid bruit.   Cardiovascular:      Rate and Rhythm: Normal rate and regular rhythm.      Heart sounds: Normal heart sounds. No murmur heard.  Pulmonary:      Effort: Pulmonary effort is normal.      Breath sounds: Normal breath sounds. No wheezing or rales.   Abdominal:      General: Bowel sounds are normal. There is no distension.      Palpations: Abdomen is soft.      Tenderness: There is no abdominal tenderness.   Musculoskeletal:         General: No tenderness or deformity. Normal range of motion.      Cervical back: Normal range of motion and neck supple.      Lumbar back: Normal. No spasms.      Comments: Bends 90 degrees at  waistShoulders and knees have good range of motion with mild crepitance.  No pitting edema to lower extremities.   Lymphadenopathy:      Cervical: No cervical adenopathy.   Skin:     General: Skin is warm and dry.      Findings: No rash.   Neurological:      Mental Status: She is alert and oriented to person, place, and time.      Cranial Nerves: No cranial nerve deficit.      Motor: Weakness present.      Coordination: Coordination normal.      Gait: Gait abnormal (Slow shuffling gait).      Comments: No hemiparesis present today   Psychiatric:         Mood and Affect: Mood normal.         Behavior: Behavior normal.         Thought Content: " Thought content normal.         Judgment: Judgment normal.         Assessment:       1. TIA (transient ischemic attack)    2. Anxiety disorder, unspecified type    3. Hyperlipidemia, unspecified hyperlipidemia type    4. Atherosclerotic heart disease of native coronary artery with other forms of angina pectoris    5. Gastroesophageal reflux disease without esophagitis    6. Hypothyroidism, unspecified type    7. Senile debility    8. Vascular dementia without behavioral disturbance    9. Stroke of right basal ganglia    10. Carotid artery plaque, bilateral    11. Stented coronary artery    12. Age-related osteoporosis without current pathological fracture    13. Gait disturbance           Plan:       TIA (transient ischemic attack)   Patient will be referred to Neurology Dr. Pathak, continue Aggrenox b.I.d.  Anxiety disorder, unspecified type  -     amitriptyline (ELAVIL) 10 MG tablet; Take 2 tablets (20 mg total) by mouth nightly as needed for Insomnia.  Dispense: 180 tablet; Refill: 1    Hyperlipidemia, unspecified hyperlipidemia type  -     atorvastatin (LIPITOR) 40 MG tablet; Take 1 tablet (40 mg total) by mouth once daily.  Dispense: 90 tablet; Refill: 1   Continue atorvastatin  Atherosclerotic heart disease of native coronary artery with other forms of angina pectoris  -     carvediloL (COREG) 3.125 MG tablet; Take 1 tablet (3.125 mg total) by mouth 2 (two) times daily.  Dispense: 180 tablet; Refill: 1    Gastroesophageal reflux disease without esophagitis  -     famotidine (PEPCID) 20 MG tablet; Take 1 tablet (20 mg total) by mouth nightly as needed for Heartburn.  Dispense: 90 tablet; Refill: 1  -     pantoprazole (PROTONIX) 40 MG tablet; Take 1 tablet (40 mg total) by mouth once daily.  Dispense: 90 tablet; Refill: 1    Hypothyroidism, unspecified type  -     UNITHROID 75 mcg tablet; Take 1 tablet (75 mcg total) by mouth before breakfast.  Dispense: 90 tablet; Refill: 1    Senile debility  -     FLUoxetine  20 MG capsule; Take 1 capsule (20 mg total) by mouth once daily.  Dispense: 90 capsule; Refill: 1   Increase fluoxetine to 20 mg daily  Vascular dementia without behavioral disturbance   Discontinue donepezil  Stroke of right basal ganglia    Carotid artery plaque, bilateral    Stented coronary artery    Age-related osteoporosis without current pathological fracture    Gait disturbance   Continue Rollator  Follow up in about 6 months (around 2/28/2023), or htn,TIA.        8/31/2022 Vidal Kraus

## 2022-09-27 ENCOUNTER — LAB VISIT (OUTPATIENT)
Dept: LAB | Facility: HOSPITAL | Age: 87
End: 2022-09-27
Attending: INTERNAL MEDICINE
Payer: MEDICARE

## 2022-09-27 DIAGNOSIS — I51.9 MYXEDEMA HEART DISEASE: Primary | ICD-10-CM

## 2022-09-27 DIAGNOSIS — Z79.899 ENCOUNTER FOR LONG-TERM (CURRENT) USE OF OTHER MEDICATIONS: ICD-10-CM

## 2022-09-27 DIAGNOSIS — E03.9 MYXEDEMA HEART DISEASE: Primary | ICD-10-CM

## 2022-09-27 DIAGNOSIS — M81.0 SENILE OSTEOPOROSIS: ICD-10-CM

## 2022-09-27 LAB
25(OH)D3+25(OH)D2 SERPL-MCNC: 47 NG/ML (ref 30–96)
ANION GAP SERPL CALC-SCNC: 10 MMOL/L (ref 8–16)
BUN SERPL-MCNC: 15 MG/DL (ref 8–23)
CALCIUM SERPL-MCNC: 8.9 MG/DL (ref 8.7–10.5)
CHLORIDE SERPL-SCNC: 108 MMOL/L (ref 95–110)
CO2 SERPL-SCNC: 24 MMOL/L (ref 23–29)
CREAT SERPL-MCNC: 0.9 MG/DL (ref 0.5–1.4)
EST. GFR  (NO RACE VARIABLE): >60 ML/MIN/1.73 M^2
GLUCOSE SERPL-MCNC: 99 MG/DL (ref 70–110)
POTASSIUM SERPL-SCNC: 3.8 MMOL/L (ref 3.5–5.1)
SODIUM SERPL-SCNC: 142 MMOL/L (ref 136–145)
T4 FREE SERPL-MCNC: 1.09 NG/DL (ref 0.71–1.51)
TSH SERPL DL<=0.005 MIU/L-ACNC: 1.39 UIU/ML (ref 0.34–5.6)

## 2022-09-27 PROCEDURE — 84439 ASSAY OF FREE THYROXINE: CPT | Performed by: INTERNAL MEDICINE

## 2022-09-27 PROCEDURE — 36415 COLL VENOUS BLD VENIPUNCTURE: CPT | Performed by: INTERNAL MEDICINE

## 2022-09-27 PROCEDURE — 80048 BASIC METABOLIC PNL TOTAL CA: CPT | Performed by: INTERNAL MEDICINE

## 2022-09-27 PROCEDURE — 82306 VITAMIN D 25 HYDROXY: CPT | Performed by: INTERNAL MEDICINE

## 2022-09-27 PROCEDURE — 84443 ASSAY THYROID STIM HORMONE: CPT | Performed by: INTERNAL MEDICINE

## 2022-10-04 DIAGNOSIS — M85.89 OTHER SPECIFIED DISORDERS OF BONE DENSITY AND STRUCTURE, MULTIPLE SITES: Primary | ICD-10-CM

## 2022-10-06 DIAGNOSIS — M85.89 OTHER SPECIFIED DISORDERS OF BONE DENSITY AND STRUCTURE, MULTIPLE SITES: Primary | ICD-10-CM

## 2022-10-28 ENCOUNTER — PATIENT MESSAGE (OUTPATIENT)
Dept: FAMILY MEDICINE | Facility: CLINIC | Age: 87
End: 2022-10-28

## 2022-10-31 ENCOUNTER — PATIENT MESSAGE (OUTPATIENT)
Dept: FAMILY MEDICINE | Facility: CLINIC | Age: 87
End: 2022-10-31

## 2022-11-01 ENCOUNTER — OFFICE VISIT (OUTPATIENT)
Dept: FAMILY MEDICINE | Facility: CLINIC | Age: 87
End: 2022-11-01
Payer: MEDICARE

## 2022-11-01 ENCOUNTER — HOSPITAL ENCOUNTER (OUTPATIENT)
Dept: RADIOLOGY | Facility: HOSPITAL | Age: 87
Discharge: HOME | End: 2022-11-01
Attending: NURSE PRACTITIONER
Payer: MEDICARE

## 2022-11-01 VITALS
BODY MASS INDEX: 24.66 KG/M2 | SYSTOLIC BLOOD PRESSURE: 128 MMHG | HEART RATE: 68 BPM | HEIGHT: 62 IN | WEIGHT: 134 LBS | DIASTOLIC BLOOD PRESSURE: 60 MMHG

## 2022-11-01 DIAGNOSIS — R05.9 COUGH, UNSPECIFIED TYPE: ICD-10-CM

## 2022-11-01 DIAGNOSIS — G62.9 NEUROPATHY: ICD-10-CM

## 2022-11-01 DIAGNOSIS — G31.84 MILD COGNITIVE IMPAIRMENT: ICD-10-CM

## 2022-11-01 DIAGNOSIS — I10 ESSENTIAL HYPERTENSION: ICD-10-CM

## 2022-11-01 DIAGNOSIS — R05.9 COUGH, UNSPECIFIED TYPE: Primary | ICD-10-CM

## 2022-11-01 DIAGNOSIS — E78.00 PURE HYPERCHOLESTEROLEMIA: ICD-10-CM

## 2022-11-01 DIAGNOSIS — J40 BRONCHITIS: ICD-10-CM

## 2022-11-01 LAB
CTP QC/QA: YES
CTP QC/QA: YES
POC MOLECULAR INFLUENZA A AGN: NEGATIVE
POC MOLECULAR INFLUENZA B AGN: NEGATIVE
SARS-COV-2 RDRP RESP QL NAA+PROBE: NEGATIVE

## 2022-11-01 PROCEDURE — 87502 INFLUENZA DNA AMP PROBE: CPT | Mod: QW,,, | Performed by: NURSE PRACTITIONER

## 2022-11-01 PROCEDURE — 71046 X-RAY EXAM CHEST 2 VIEWS: CPT | Mod: TC,PO

## 2022-11-01 PROCEDURE — 99213 PR OFFICE/OUTPT VISIT, EST, LEVL III, 20-29 MIN: ICD-10-PCS | Mod: S$GLB,,, | Performed by: NURSE PRACTITIONER

## 2022-11-01 PROCEDURE — 87635 SARS-COV-2 COVID-19 AMP PRB: CPT | Mod: QW,CR,S$GLB, | Performed by: NURSE PRACTITIONER

## 2022-11-01 PROCEDURE — 87502 POCT INFLUENZA A/B MOLECULAR: ICD-10-PCS | Mod: QW,,, | Performed by: NURSE PRACTITIONER

## 2022-11-01 PROCEDURE — 99213 OFFICE O/P EST LOW 20 MIN: CPT | Mod: S$GLB,,, | Performed by: NURSE PRACTITIONER

## 2022-11-01 PROCEDURE — 87635: ICD-10-PCS | Mod: QW,CR,S$GLB, | Performed by: NURSE PRACTITIONER

## 2022-11-01 NOTE — PROGRESS NOTES
SUBJECTIVE:    Patient ID: Mere Hoffmann is a 89 y.o. female.    Chief Complaint: Bronchitis (Cough yellow sputum did go to [facility] they gave her Omnicef and she still is not feeling any better, feeling weak and tired now to// SW)    8-year-old female presents for urgent visit. Daughter is present during the exam. Patient was seen at urgent care on 10/23. Chest xray performed. Negative for pneumonia. Given omnicef x 5 days. Still not feeling better. Has productive cough with green sputum. No fever. More fatigue lately. No sore throat. Has noticed decreased appetite. No shortness of breath. Treated for dementia, history of stroke, htn, hyperlipidemia, cad, hypothyroid, and osteoporosis. .  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  She has recovered physically but still has dementia and cognitive impairment. .      Office Visit on 11/01/2022   Component Date Value Ref Range Status    POC Rapid COVID 11/01/2022 Negative  Negative Final     Acceptable 11/01/2022 Yes   Final    POC Molecular Influenza A Ag 11/01/2022 Negative  Negative, Not Reported Final    POC Molecular Influenza B Ag 11/01/2022 Negative  Negative, Not Reported Final     Acceptable 11/01/2022 Yes   Final   Lab Visit on 09/27/2022   Component Date Value Ref Range Status    Sodium 09/27/2022 142  136 - 145 mmol/L Final    Potassium 09/27/2022 3.8  3.5 - 5.1 mmol/L Final    Chloride 09/27/2022 108  95 - 110 mmol/L Final    CO2 09/27/2022 24  23 - 29 mmol/L Final    Glucose 09/27/2022 99  70 - 110 mg/dL Final    BUN 09/27/2022 15  8 - 23 mg/dL Final    Creatinine 09/27/2022 0.9  0.5 - 1.4 mg/dL Final    Calcium 09/27/2022 8.9  8.7 - 10.5 mg/dL Final    Anion Gap 09/27/2022 10  8 - 16 mmol/L Final    eGFR 09/27/2022 >60.0  >60 mL/min/1.73 m^2 Final    Free T4 09/27/2022 1.09  0.71 - 1.51 ng/dL Final    TSH 09/27/2022 1.390  0.340 - 5.600 uIU/mL Final    Vit D, 25-Hydroxy  09/27/2022 47  30 - 96 ng/mL Final   Lab Visit on 06/28/2022   Component Date Value Ref Range Status    Calcium 06/28/2022 9.2  8.7 - 10.5 mg/dL Final    Vit D, 25-Hydroxy 06/28/2022 41  30 - 96 ng/mL Final   Hospital Outpatient Visit on 06/27/2022   Component Date Value Ref Range Status    85% Max Predicted HR 06/27/2022 109   Final    Max Predicted HR 06/27/2022 128   Final    [facility] CV CPX PATIENT IS MALE 06/27/2022 0.0   Final    [facility] CV CPX PATIENT IS FEMALE 06/27/2022 1.0   Final    HR at rest 06/27/2022 57  bpm Final    Systolic blood pressure 06/27/2022 149  mmHg Final    Diastolic blood pressure 06/27/2022 59  mmHg Final    RPP 06/27/2022 8,493   Final    Peak HR 06/27/2022 101.0  bpm Final    Peak Systolic BP 06/27/2022 149  mmHg Final    Peak Diatolic BP 06/27/2022 59  mmHg Final    Peak RPP 06/27/2022 15,049   Final    % Max HR Achieved 06/27/2022 79   Final    1 Minute Recovery HR 06/27/2022 98  bpm Final    dose 06/27/2022 0.4  mg Final    Pharm Time 06/27/2022 1,455  min Final    Nuc Rest EF 06/27/2022 77   Final    Nuc Stress EF 06/27/2022 69  % Final   Lab Visit on 05/06/2022   Component Date Value Ref Range Status    Calcium 05/06/2022 9.1  8.7 - 10.5 mg/dL Final    Ionized Calcium 05/06/2022 1.20  1.06 - 1.42 mmol/L Final    Vit D, 25-Hydroxy 05/06/2022 47  30 - 96 ng/mL Final       Past Medical History:   Diagnosis Date    Hyperlipidemia     Hypertension      Social History     Socioeconomic History    Marital status:    Tobacco Use    Smoking status: Former    Smokeless tobacco: Never   Substance and Sexual Activity    Alcohol use: Not Currently    Drug use: Never     Past Surgical History:   Procedure Laterality Date    CARDIAC CATHETERIZATION      CHOLECYSTECTOMY      CORONARY ANGIOPLASTY      moh's surgery  2019    basal cell ca     Family History   Problem Relation Age of Onset    Heart disease Mother        Review of patient's allergies indicates:   Allergen Reactions    Penicillins      Sulfa (sulfonamide antibiotics)        Current Outpatient Medications:     amitriptyline (ELAVIL) 10 MG tablet, Take 2 tablets (20 mg total) by mouth nightly as needed for Insomnia., Disp: 180 tablet, Rfl: 1    ascorbate calcium (NEFTALI-C ORAL), Take 0.5 tablets by mouth once daily., Disp: , Rfl:     atorvastatin (LIPITOR) 40 MG tablet, Take 1 tablet (40 mg total) by mouth once daily., Disp: 90 tablet, Rfl: 1    carvediloL (COREG) 3.125 MG tablet, Take 1 tablet (3.125 mg total) by mouth 2 (two) times daily., Disp: 180 tablet, Rfl: 1    cholecalciferol, vitamin D3, (VITAMIN D3) 50 mcg (2,000 unit) Cap, Take 1 capsule by mouth once daily., Disp: , Rfl:     clobetasoL (TEMOVATE) 0.05 % external solution, Apply to scalp 1-2x/day prn itching/scaling (Patient not taking: No sig reported), Disp: 50 mL, Rfl: 11    cyanocobalamin, vitamin B-12, 1,000 mcg TbSR, Take 1 tablet by mouth once daily., Disp: , Rfl:     dipyridamole-aspirin 200-25 mg (AGGRENOX)  mg CM12, TK 1 C PO BID, Disp: 180 capsule, Rfl: 1    donepeziL (ARICEPT) 5 MG tablet, Take 1 tablet (5 mg total) by mouth every evening., Disp: 30 tablet, Rfl: 5    famotidine (PEPCID) 20 MG tablet, Take 1 tablet (20 mg total) by mouth nightly as needed for Heartburn., Disp: 90 tablet, Rfl: 1    FLUoxetine 10 MG capsule, Take 1 capsule (10 mg total) by mouth once daily., Disp: 90 capsule, Rfl: 1    FLUoxetine 20 MG capsule, Take 1 capsule (20 mg total) by mouth once daily., Disp: 90 capsule, Rfl: 1    multivitamin capsule, Take 1 capsule by mouth once daily., Disp: , Rfl:     nitroGLYCERIN (NITROSTAT) 0.3 MG SL tablet, Place 0.3 mg under the tongue every 5 (five) minutes as needed for Chest pain., Disp: , Rfl:     pantoprazole (PROTONIX) 40 MG tablet, Take 1 tablet (40 mg total) by mouth once daily., Disp: 90 tablet, Rfl: 1    promethazine (PHENERGAN) 25 MG tablet, Take 1 tablet (25 mg total) by mouth every 6 (six) hours as needed for Nausea., Disp: 15 tablet, Rfl: 0     "UNITHROID 75 mcg tablet, Take 1 tablet (75 mcg total) by mouth before breakfast., Disp: 90 tablet, Rfl: 1    Review of Systems   Constitutional:  Positive for activity change and unexpected weight change. Negative for chills and fever.   HENT:  Positive for congestion and sinus pressure. Negative for ear discharge, ear pain and sore throat.    Eyes:  Negative for discharge.   Respiratory:  Positive for cough. Negative for shortness of breath.    Cardiovascular:  Negative for chest pain and leg swelling.        Objective:      Vitals:    11/01/22 0905   BP: 128/60   Pulse: 68   Weight: 60.8 kg (134 lb)   Height: 5' 2" (1.575 m)     Physical Exam  Vitals and nursing note reviewed.   Constitutional:       General: She is not in acute distress.     Appearance: Normal appearance. She is well-developed. She is not ill-appearing.   HENT:      Right Ear: External ear normal.      Left Ear: External ear normal.      Nose: Congestion present.      Mouth/Throat:      Pharynx: Posterior oropharyngeal erythema present.      Tonsils: No tonsillar exudate.   Cardiovascular:      Rate and Rhythm: Normal rate and regular rhythm.      Heart sounds: Normal heart sounds.   Pulmonary:      Breath sounds: Wheezing present.   Lymphadenopathy:      Cervical: No cervical adenopathy.   Neurological:      Mental Status: She is alert.         Assessment:       1. Cough, unspecified type    2. Neuropathy    3. Essential hypertension    4. Pure hypercholesterolemia    5. Mild cognitive impairment    6. Bronchitis           Plan:       Cough, unspecified type  -     POCT COVID-19 Rapid Screening  -     POCT Influenza A/B Molecular  -     X-Ray Chest PA And Lateral; Future; Expected date: 11/01/2022    Neuropathy    Essential hypertension    Pure hypercholesterolemia    Mild cognitive impairment    Bronchitis  Comments:  chest xray now    Follow up if symptoms worsen or fail to improve, for medication management.        11/1/2022 Kimberly " Mendoza

## 2022-11-02 ENCOUNTER — PATIENT MESSAGE (OUTPATIENT)
Dept: FAMILY MEDICINE | Facility: CLINIC | Age: 87
End: 2022-11-02

## 2022-11-02 DIAGNOSIS — R05.9 COUGH, UNSPECIFIED TYPE: Primary | ICD-10-CM

## 2022-11-02 RX ORDER — CODEINE PHOSPHATE AND GUAIFENESIN 10; 100 MG/5ML; MG/5ML
5 SOLUTION ORAL EVERY 6 HOURS PRN
Qty: 120 ML | Refills: 0 | Status: SHIPPED | OUTPATIENT
Start: 2022-11-02 | End: 2022-11-12

## 2022-11-03 ENCOUNTER — PATIENT MESSAGE (OUTPATIENT)
Dept: CARDIOLOGY | Facility: CLINIC | Age: 87
End: 2022-11-03

## 2022-11-03 ENCOUNTER — OFFICE VISIT (OUTPATIENT)
Dept: CARDIOLOGY | Facility: CLINIC | Age: 87
End: 2022-11-03
Payer: MEDICARE

## 2022-11-03 ENCOUNTER — LAB VISIT (OUTPATIENT)
Dept: LAB | Facility: HOSPITAL | Age: 87
End: 2022-11-03
Attending: INTERNAL MEDICINE
Payer: MEDICARE

## 2022-11-03 VITALS
HEART RATE: 76 BPM | HEIGHT: 62 IN | DIASTOLIC BLOOD PRESSURE: 51 MMHG | WEIGHT: 135.81 LBS | BODY MASS INDEX: 24.99 KG/M2 | SYSTOLIC BLOOD PRESSURE: 93 MMHG | OXYGEN SATURATION: 95 %

## 2022-11-03 DIAGNOSIS — I25.118 ATHEROSCLEROTIC HEART DISEASE OF NATIVE CORONARY ARTERY WITH OTHER FORMS OF ANGINA PECTORIS: Primary | Chronic | ICD-10-CM

## 2022-11-03 DIAGNOSIS — I25.118 ATHEROSCLEROTIC HEART DISEASE OF NATIVE CORONARY ARTERY WITH OTHER FORMS OF ANGINA PECTORIS: Primary | ICD-10-CM

## 2022-11-03 DIAGNOSIS — R53.81 PHYSICAL DECONDITIONING: Chronic | ICD-10-CM

## 2022-11-03 DIAGNOSIS — I35.1 NONRHEUMATIC AORTIC VALVE INSUFFICIENCY: ICD-10-CM

## 2022-11-03 DIAGNOSIS — I35.1 NONRHEUMATIC AORTIC VALVE INSUFFICIENCY: Chronic | ICD-10-CM

## 2022-11-03 DIAGNOSIS — I25.118 ATHEROSCLEROTIC HEART DISEASE OF NATIVE CORONARY ARTERY WITH OTHER FORMS OF ANGINA PECTORIS: ICD-10-CM

## 2022-11-03 DIAGNOSIS — I65.23 CAROTID ARTERY PLAQUE, BILATERAL: Chronic | ICD-10-CM

## 2022-11-03 DIAGNOSIS — I95.1 ORTHOSTATIC HYPOTENSION: ICD-10-CM

## 2022-11-03 DIAGNOSIS — I65.23 CAROTID ARTERY PLAQUE, BILATERAL: ICD-10-CM

## 2022-11-03 LAB
BASOPHILS # BLD AUTO: 0.06 K/UL (ref 0–0.2)
BASOPHILS NFR BLD: 0.7 % (ref 0–1.9)
DIFFERENTIAL METHOD: ABNORMAL
EOSINOPHIL # BLD AUTO: 0.3 K/UL (ref 0–0.5)
EOSINOPHIL NFR BLD: 3.5 % (ref 0–8)
ERYTHROCYTE [DISTWIDTH] IN BLOOD BY AUTOMATED COUNT: 12.9 % (ref 11.5–14.5)
HCT VFR BLD AUTO: 36.8 % (ref 37–48.5)
HGB BLD-MCNC: 12.1 G/DL (ref 12–16)
IMM GRANULOCYTES # BLD AUTO: 0.04 K/UL (ref 0–0.04)
IMM GRANULOCYTES NFR BLD AUTO: 0.5 % (ref 0–0.5)
LYMPHOCYTES # BLD AUTO: 1.4 K/UL (ref 1–4.8)
LYMPHOCYTES NFR BLD: 16.5 % (ref 18–48)
MCH RBC QN AUTO: 30.9 PG (ref 27–31)
MCHC RBC AUTO-ENTMCNC: 32.9 G/DL (ref 32–36)
MCV RBC AUTO: 94 FL (ref 82–98)
MONOCYTES # BLD AUTO: 0.4 K/UL (ref 0.3–1)
MONOCYTES NFR BLD: 4.5 % (ref 4–15)
NEUTROPHILS # BLD AUTO: 6.2 K/UL (ref 1.8–7.7)
NEUTROPHILS NFR BLD: 74.3 % (ref 38–73)
NRBC BLD-RTO: 0 /100 WBC
PLATELET # BLD AUTO: 269 K/UL (ref 150–450)
PMV BLD AUTO: 9.5 FL (ref 9.2–12.9)
RBC # BLD AUTO: 3.92 M/UL (ref 4–5.4)
WBC # BLD AUTO: 8.36 K/UL (ref 3.9–12.7)

## 2022-11-03 PROCEDURE — 99214 OFFICE O/P EST MOD 30 MIN: CPT | Mod: S$PBB,,, | Performed by: INTERNAL MEDICINE

## 2022-11-03 PROCEDURE — 80053 COMPREHEN METABOLIC PANEL: CPT | Performed by: INTERNAL MEDICINE

## 2022-11-03 PROCEDURE — 99213 OFFICE O/P EST LOW 20 MIN: CPT | Mod: PBBFAC,PO | Performed by: INTERNAL MEDICINE

## 2022-11-03 PROCEDURE — 99214 PR OFFICE/OUTPT VISIT, EST, LEVL IV, 30-39 MIN: ICD-10-PCS | Mod: S$PBB,,, | Performed by: INTERNAL MEDICINE

## 2022-11-03 PROCEDURE — 85025 COMPLETE CBC W/AUTO DIFF WBC: CPT | Performed by: INTERNAL MEDICINE

## 2022-11-03 PROCEDURE — 99999 PR PBB SHADOW E&M-EST. PATIENT-LVL III: ICD-10-PCS | Mod: PBBFAC,,, | Performed by: INTERNAL MEDICINE

## 2022-11-03 PROCEDURE — 36415 COLL VENOUS BLD VENIPUNCTURE: CPT | Mod: PO | Performed by: INTERNAL MEDICINE

## 2022-11-03 PROCEDURE — 99999 PR PBB SHADOW E&M-EST. PATIENT-LVL III: CPT | Mod: PBBFAC,,, | Performed by: INTERNAL MEDICINE

## 2022-11-03 RX ORDER — MIDODRINE HYDROCHLORIDE 2.5 MG/1
2.5 TABLET ORAL EVERY 12 HOURS
Qty: 180 TABLET | Refills: 1 | OUTPATIENT
Start: 2022-11-03 | End: 2023-11-03

## 2022-11-03 RX ORDER — MIDODRINE HYDROCHLORIDE 2.5 MG/1
2.5 TABLET ORAL EVERY 12 HOURS
Qty: 180 TABLET | Refills: 1 | Status: SHIPPED | OUTPATIENT
Start: 2022-11-03 | End: 2022-11-03 | Stop reason: SDUPTHER

## 2022-11-03 RX ORDER — MIDODRINE HYDROCHLORIDE 2.5 MG/1
2.5 TABLET ORAL EVERY 12 HOURS
Qty: 30 TABLET | Refills: 1 | Status: ON HOLD | OUTPATIENT
Start: 2022-11-03 | End: 2023-03-19 | Stop reason: HOSPADM

## 2022-11-03 NOTE — PROGRESS NOTES
Subjective:    Patient ID:  eMre Hoffmann is a 89 y.o. female who presents for Dizziness        HPI  RECENT LABS NOTED THYROID FUNCTION TEST VITAMIN-D AND BMP OK, SEES DR ESTEBAN, NEAR SYNCOPE, HR OK, SEVERE PERSISTENT COUGH WITH YELLOW SPUTUM, ON ABX, CXR OK,DID NOT GET BETTERM SAW PCP, NO PNEUMONIA, WHEEZING, REMAINS DECONDITIONED, NEAR FALLS,  SEE ROS    Past Medical History:   Diagnosis Date    Hyperlipidemia     Hypertension      Past Surgical History:   Procedure Laterality Date    CARDIAC CATHETERIZATION      CHOLECYSTECTOMY      CORONARY ANGIOPLASTY      [facility] surgery  2019    basal cell ca     Family History   Problem Relation Age of Onset    Heart disease Mother      Social History     Socioeconomic History    Marital status:    Tobacco Use    Smoking status: Former    Smokeless tobacco: Never   Substance and Sexual Activity    Alcohol use: Not Currently    Drug use: Never       Review of patient's allergies indicates:   Allergen Reactions    Penicillins     Sulfa (sulfonamide antibiotics)        Current Outpatient Medications:     amitriptyline (ELAVIL) 10 MG tablet, Take 2 tablets (20 mg total) by mouth nightly as needed for Insomnia., Disp: 180 tablet, Rfl: 1    ascorbate calcium (NEFTALI-C ORAL), Take 0.5 tablets by mouth once daily., Disp: , Rfl:     atorvastatin (LIPITOR) 40 MG tablet, Take 1 tablet (40 mg total) by mouth once daily., Disp: 90 tablet, Rfl: 1    carvediloL (COREG) 3.125 MG tablet, Take 1 tablet (3.125 mg total) by mouth 2 (two) times daily., Disp: 180 tablet, Rfl: 1    cholecalciferol, vitamin D3, (VITAMIN D3) 50 mcg (2,000 unit) Cap, Take 1 capsule by mouth once daily., Disp: , Rfl:     cyanocobalamin, vitamin B-12, 1,000 mcg TbSR, Take 1 tablet by mouth once daily., Disp: , Rfl:     dipyridamole-aspirin 200-25 mg (AGGRENOX)  mg CM12, TK 1 C PO BID, Disp: 180 capsule, Rfl: 1    donepeziL (ARICEPT) 5 MG tablet, Take 1 tablet (5 mg total) by mouth every evening., Disp: 30  tablet, Rfl: 5    famotidine (PEPCID) 20 MG tablet, Take 1 tablet (20 mg total) by mouth nightly as needed for Heartburn., Disp: 90 tablet, Rfl: 1    FLUoxetine 10 MG capsule, Take 1 capsule (10 mg total) by mouth once daily., Disp: 90 capsule, Rfl: 1    FLUoxetine 20 MG capsule, Take 1 capsule (20 mg total) by mouth once daily., Disp: 90 capsule, Rfl: 1    multivitamin capsule, Take 1 capsule by mouth once daily., Disp: , Rfl:     nitroGLYCERIN (NITROSTAT) 0.3 MG SL tablet, Place 0.3 mg under the tongue every 5 (five) minutes as needed for Chest pain., Disp: , Rfl:     pantoprazole (PROTONIX) 40 MG tablet, Take 1 tablet (40 mg total) by mouth once daily., Disp: 90 tablet, Rfl: 1    promethazine (PHENERGAN) 25 MG tablet, Take 1 tablet (25 mg total) by mouth every 6 (six) hours as needed for Nausea., Disp: 15 tablet, Rfl: 0    UNITHROID 75 mcg tablet, Take 1 tablet (75 mcg total) by mouth before breakfast., Disp: 90 tablet, Rfl: 1    clobetasoL (TEMOVATE) 0.05 % external solution, Apply to scalp 1-2x/day prn itching/scaling (Patient not taking: Reported on 7/14/2022), Disp: 50 mL, Rfl: 11    guaiFENesin-codeine 100-10 mg/5 ml (TUSSI-ORGANIDIN NR)  mg/5 mL syrup, Take 5 mLs by mouth every 6 (six) hours as needed for Cough. (Patient not taking: Reported on 11/3/2022), Disp: 120 mL, Rfl: 0    midodrine (PROAMATINE) 2.5 MG Tab, Take 1 tablet (2.5 mg total) by mouth every 12 (twelve) hours. for 15 days, Disp: 30 tablet, Rfl: 1    Review of Systems   Constitutional: Negative for chills, diaphoresis, fever, malaise/fatigue and night sweats.   HENT:  Negative for congestion and nosebleeds.    Eyes:  Negative for blurred vision and visual disturbance.   Cardiovascular:  Positive for near-syncope. Negative for chest pain, claudication, cyanosis, dyspnea on exertion (MILD), irregular heartbeat, leg swelling, orthopnea, palpitations, paroxysmal nocturnal dyspnea and syncope.   Respiratory:  Positive for cough. Negative for  "hemoptysis, shortness of breath and wheezing (LESS).    Endocrine: Negative for cold intolerance, heat intolerance and polyuria.   Hematologic/Lymphatic: Negative for adenopathy. Does not bruise/bleed easily.   Skin:  Negative for color change and itching.   Musculoskeletal:  Negative for back pain and falls.   Gastrointestinal:  Negative for abdominal pain, change in bowel habit, dysphagia, jaundice, melena and nausea.   Genitourinary:  Negative for dysuria and flank pain.   Neurological:  Positive for dizziness. Negative for brief paralysis, focal weakness, light-headedness, loss of balance, numbness, paresthesias, seizures, sensory change and tremors.   Psychiatric/Behavioral:  Positive for memory loss. The patient is not nervous/anxious.    Allergic/Immunologic: Negative for hives and persistent infections.      Objective:      Vitals:    11/03/22 1419   BP: (!) 93/51   Pulse: 76   SpO2: 95%   Weight: 61.6 kg (135 lb 12.9 oz)   Height: 5' 2" (1.575 m)     Body mass index is 24.84 kg/m².    Physical Exam  Constitutional:       Appearance: Normal appearance.   Eyes:      General: No scleral icterus.     Extraocular Movements: Extraocular movements intact.      Pupils: Pupils are equal, round, and reactive to light.   Neck:      Vascular: No carotid bruit.   Cardiovascular:      Rate and Rhythm: Normal rate and regular rhythm. No extrasystoles are present.     Pulses:           Carotid pulses are 2+ on the right side and 2+ on the left side.       Radial pulses are 2+ on the right side and 2+ on the left side.        Posterior tibial pulses are 2+ on the right side and 2+ on the left side.      Heart sounds: Murmur heard.   Decrescendo early diastolic murmur is present at the upper right sternal border.     No friction rub. No gallop.   Pulmonary:      Effort: Pulmonary effort is normal.      Breath sounds: Examination of the right-middle field reveals rhonchi. Rhonchi present. No rales.      Comments: OCC " RHONCHI  Abdominal:      Palpations: Abdomen is soft.      Tenderness: There is no abdominal tenderness.   Musculoskeletal:      Cervical back: Neck supple.      Right lower leg: No edema.      Left lower leg: No edema.   Skin:     Capillary Refill: Capillary refill takes less than 2 seconds.   Neurological:      General: No focal deficit present.      Mental Status: She is alert.      Cranial Nerves: No cranial nerve deficit.   Psychiatric:         Mood and Affect: Mood normal.         Speech: Speech normal.               ..    Chemistry        Component Value Date/Time     11/03/2022 1500    K 4.4 11/03/2022 1500     11/03/2022 1500    CO2 21 (L) 11/03/2022 1500    BUN 19 11/03/2022 1500    CREATININE 1.1 11/03/2022 1500     11/03/2022 1500        Component Value Date/Time    CALCIUM 8.8 11/03/2022 1500    ALKPHOS 87 11/03/2022 1500    AST 14 11/03/2022 1500    ALT 15 11/03/2022 1500    BILITOT 0.8 11/03/2022 1500    ESTGFRAFRICA 58.1 (A) 03/02/2022 1511    EGFRNONAA 50.4 (A) 03/02/2022 1511            ..  Lab Results   Component Value Date    CHOL 145 02/17/2022    CHOL 151 08/09/2021    CHOL 131 01/12/2021     Lab Results   Component Value Date    HDL 48 02/17/2022    HDL 44 08/09/2021    HDL 43 (L) 01/12/2021     Lab Results   Component Value Date    LDLCALC 72.6 02/17/2022    LDLCALC 78.2 08/09/2021    LDLCALC 66 01/12/2021     Lab Results   Component Value Date    TRIG 122 02/17/2022    TRIG 144 08/09/2021    TRIG 141 01/12/2021     Lab Results   Component Value Date    CHOLHDL 33.1 02/17/2022    CHOLHDL 29.1 08/09/2021    CHOLHDL 3.0 01/12/2021     ..  Lab Results   Component Value Date    WBC 8.36 11/03/2022    HGB 12.1 11/03/2022    HCT 36.8 (L) 11/03/2022    MCV 94 11/03/2022     11/03/2022       Test(s) Reviewed  I have reviewed the following in detail:  [] Stress test   [] Angiography   [] Echocardiogram   [x] Labs   [] Other:       Assessment:         ICD-10-CM ICD-9-CM   1.  Atherosclerotic heart disease of native coronary artery with other forms of angina pectoris  I25.118 414.01     413.9   2. Orthostatic hypotension  I95.1 458.0   3. Nonrheumatic aortic valve insufficiency  I35.1 424.1   4. Carotid artery plaque, bilateral  I65.23 433.10     433.30     Problem List Items Addressed This Visit          Cardiac/Vascular    Atherosclerotic heart disease of native coronary artery with other forms of angina pectoris - Primary    Nonrheumatic aortic valve insufficiency    Carotid artery plaque, bilateral    Orthostatic hypotension        Plan:     LABS TODAY, MIDODRINE PRN, WAS ON IT BEFORE, CLASS 1-2 ANGINA NO OVERT HEART FAILURE NO TIA SYMPTOMS NO NEAR-SYNCOPE, STAY ACTIVE INCREASE LEG EXERCISE INCREASE ACTIVITY DISCUSSED PLAN WITH THE PATIENT AND HER DAUGHTER, RETURN TO CLINIC IN 6 MO      Atherosclerotic heart disease of native coronary artery with other forms of angina pectoris  -     Comprehensive Metabolic Panel; Future; Expected date: 11/03/2022  -     CBC Auto Differential; Future; Expected date: 11/03/2022    Orthostatic hypotension  Comments:  DECREASE MEDS  Orders:  -     Comprehensive Metabolic Panel; Future; Expected date: 11/03/2022  -     CBC Auto Differential; Future; Expected date: 11/03/2022    Nonrheumatic aortic valve insufficiency    Carotid artery plaque, bilateral    Other orders  -     Discontinue: midodrine (PROAMATINE) 2.5 MG Tab; Take 1 tablet (2.5 mg total) by mouth every 12 (twelve) hours.  Dispense: 180 tablet; Refill: 1  RTC Low level/low impact aerobic exercise 5x's/wk. Heart healthy diet and risk factor modification.    See labs and med orders.    Aerobic exercise 5x's/wk. Heart healthy diet and risk factor modification.    See labs and med orders.

## 2022-11-04 PROBLEM — R53.81 PHYSICAL DECONDITIONING: Chronic | Status: ACTIVE | Noted: 2022-11-04

## 2022-11-04 LAB
ALBUMIN SERPL BCP-MCNC: 3.3 G/DL (ref 3.5–5.2)
ALP SERPL-CCNC: 87 U/L (ref 55–135)
ALT SERPL W/O P-5'-P-CCNC: 15 U/L (ref 10–44)
ANION GAP SERPL CALC-SCNC: 11 MMOL/L (ref 8–16)
AST SERPL-CCNC: 14 U/L (ref 10–40)
BILIRUB SERPL-MCNC: 0.8 MG/DL (ref 0.1–1)
BUN SERPL-MCNC: 19 MG/DL (ref 8–23)
CALCIUM SERPL-MCNC: 8.8 MG/DL (ref 8.7–10.5)
CHLORIDE SERPL-SCNC: 108 MMOL/L (ref 95–110)
CO2 SERPL-SCNC: 21 MMOL/L (ref 23–29)
CREAT SERPL-MCNC: 1.1 MG/DL (ref 0.5–1.4)
EST. GFR  (NO RACE VARIABLE): 48 ML/MIN/1.73 M^2
GLUCOSE SERPL-MCNC: 109 MG/DL (ref 70–110)
POTASSIUM SERPL-SCNC: 4.4 MMOL/L (ref 3.5–5.1)
PROT SERPL-MCNC: 6.7 G/DL (ref 6–8.4)
SODIUM SERPL-SCNC: 140 MMOL/L (ref 136–145)

## 2022-11-08 ENCOUNTER — PATIENT MESSAGE (OUTPATIENT)
Dept: FAMILY MEDICINE | Facility: CLINIC | Age: 87
End: 2022-11-08

## 2022-11-23 ENCOUNTER — INFUSION (OUTPATIENT)
Dept: INFUSION THERAPY | Facility: HOSPITAL | Age: 87
End: 2022-11-23
Attending: INTERNAL MEDICINE
Payer: MEDICARE

## 2022-11-23 VITALS
TEMPERATURE: 98 F | OXYGEN SATURATION: 99 % | HEART RATE: 89 BPM | WEIGHT: 132.69 LBS | SYSTOLIC BLOOD PRESSURE: 130 MMHG | RESPIRATION RATE: 15 BRPM | DIASTOLIC BLOOD PRESSURE: 74 MMHG | BODY MASS INDEX: 24.42 KG/M2 | HEIGHT: 62 IN

## 2022-11-23 DIAGNOSIS — M81.0 AGE-RELATED OSTEOPOROSIS WITHOUT CURRENT PATHOLOGICAL FRACTURE: Primary | ICD-10-CM

## 2022-11-23 PROCEDURE — 96372 THER/PROPH/DIAG INJ SC/IM: CPT

## 2022-11-23 PROCEDURE — 63600175 PHARM REV CODE 636 W HCPCS: Mod: JG | Performed by: INTERNAL MEDICINE

## 2022-11-23 RX ADMIN — DENOSUMAB 60 MG: 60 INJECTION SUBCUTANEOUS at 03:11

## 2022-11-23 NOTE — PLAN OF CARE
Problem: Fatigue  Goal: Improved Activity Tolerance  Outcome: Ongoing, Progressing  Intervention: Promote Improved Energy  Flowsheets (Taken 11/23/2022 1502)  Fatigue Management:   fatigue-related activity identified   frequent rest breaks encouraged   paced activity encouraged  Sleep/Rest Enhancement: relaxation techniques promoted

## 2023-01-09 DIAGNOSIS — R29.6 REPEATED FALLS: Primary | ICD-10-CM

## 2023-01-11 ENCOUNTER — PATIENT MESSAGE (OUTPATIENT)
Dept: FAMILY MEDICINE | Facility: CLINIC | Age: 88
End: 2023-01-11

## 2023-01-11 DIAGNOSIS — R39.9 UTI SYMPTOMS: Primary | ICD-10-CM

## 2023-01-12 ENCOUNTER — LAB VISIT (OUTPATIENT)
Dept: LAB | Facility: HOSPITAL | Age: 88
End: 2023-01-12
Attending: FAMILY MEDICINE
Payer: MEDICARE

## 2023-01-12 DIAGNOSIS — R39.9 UTI SYMPTOMS: ICD-10-CM

## 2023-01-12 LAB
BILIRUB UR QL STRIP: NEGATIVE
CLARITY UR: CLEAR
COLOR UR: YELLOW
GLUCOSE UR QL STRIP: NEGATIVE
HGB UR QL STRIP: NEGATIVE
KETONES UR QL STRIP: NEGATIVE
LEUKOCYTE ESTERASE UR QL STRIP: NEGATIVE
NITRITE UR QL STRIP: NEGATIVE
PH UR STRIP: 8 [PH] (ref 5–8)
PROT UR QL STRIP: ABNORMAL
SP GR UR STRIP: 1.01 (ref 1–1.03)
URN SPEC COLLECT METH UR: ABNORMAL
UROBILINOGEN UR STRIP-ACNC: ABNORMAL EU/DL

## 2023-01-12 PROCEDURE — 81003 URINALYSIS AUTO W/O SCOPE: CPT | Performed by: FAMILY MEDICINE

## 2023-01-17 ENCOUNTER — TELEPHONE (OUTPATIENT)
Dept: FAMILY MEDICINE | Facility: CLINIC | Age: 88
End: 2023-01-17

## 2023-01-17 DIAGNOSIS — F41.9 ANXIETY DISORDER, UNSPECIFIED TYPE: ICD-10-CM

## 2023-01-17 DIAGNOSIS — I25.118 ATHEROSCLEROTIC HEART DISEASE OF NATIVE CORONARY ARTERY WITH OTHER FORMS OF ANGINA PECTORIS: ICD-10-CM

## 2023-01-17 DIAGNOSIS — E78.5 HYPERLIPIDEMIA, UNSPECIFIED HYPERLIPIDEMIA TYPE: ICD-10-CM

## 2023-01-17 DIAGNOSIS — R54 SENILE DEBILITY: ICD-10-CM

## 2023-01-17 DIAGNOSIS — K21.9 GASTROESOPHAGEAL REFLUX DISEASE WITHOUT ESOPHAGITIS: ICD-10-CM

## 2023-01-17 DIAGNOSIS — I63.81 STROKE OF RIGHT BASAL GANGLIA: ICD-10-CM

## 2023-01-17 RX ORDER — ASPIRIN AND DIPYRIDAMOLE 25; 200 MG/1; MG/1
CAPSULE, EXTENDED RELEASE ORAL
Qty: 180 CAPSULE | Refills: 1 | Status: SHIPPED | OUTPATIENT
Start: 2023-01-17

## 2023-01-17 RX ORDER — CARVEDILOL 3.12 MG/1
3.12 TABLET ORAL 2 TIMES DAILY
Qty: 180 TABLET | Refills: 1 | Status: ON HOLD | OUTPATIENT
Start: 2023-01-17 | End: 2023-03-19 | Stop reason: HOSPADM

## 2023-01-17 RX ORDER — PANTOPRAZOLE SODIUM 40 MG/1
40 TABLET, DELAYED RELEASE ORAL DAILY
Qty: 90 TABLET | Refills: 1 | Status: SHIPPED | OUTPATIENT
Start: 2023-01-17

## 2023-01-17 RX ORDER — FAMOTIDINE 20 MG/1
20 TABLET, FILM COATED ORAL NIGHTLY PRN
Qty: 90 TABLET | Refills: 1 | Status: SHIPPED | OUTPATIENT
Start: 2023-01-17 | End: 2024-01-17

## 2023-01-17 RX ORDER — ATORVASTATIN CALCIUM 40 MG/1
40 TABLET, FILM COATED ORAL DAILY
Qty: 90 TABLET | Refills: 1 | Status: SHIPPED | OUTPATIENT
Start: 2023-01-17 | End: 2023-04-20 | Stop reason: SDUPTHER

## 2023-01-17 RX ORDER — FLUOXETINE HYDROCHLORIDE 20 MG/1
20 CAPSULE ORAL DAILY
Qty: 90 CAPSULE | Refills: 1 | Status: SHIPPED | OUTPATIENT
Start: 2023-01-17 | End: 2024-01-17

## 2023-01-17 RX ORDER — AMITRIPTYLINE HYDROCHLORIDE 10 MG/1
20 TABLET, FILM COATED ORAL NIGHTLY PRN
Qty: 180 TABLET | Refills: 1 | Status: SHIPPED | OUTPATIENT
Start: 2023-01-17 | End: 2023-02-20

## 2023-01-17 NOTE — TELEPHONE ENCOUNTER
Last ov: 11/1/22  Next ov: 2/20/23  Last dispense:  11/21/22, 11/14/22, 11/23/22  Last written 8/29/22 ( dipyridamole- aspirin

## 2023-01-17 NOTE — TELEPHONE ENCOUNTER
----- Message from Vidal Kraus MD sent at 1/15/2023  7:19 PM CST -----  Call patient's daughter.  Urinalysis looks good.  No sign of infection.

## 2023-02-20 ENCOUNTER — OFFICE VISIT (OUTPATIENT)
Dept: FAMILY MEDICINE | Facility: CLINIC | Age: 88
End: 2023-02-20
Payer: MEDICARE

## 2023-02-20 VITALS
WEIGHT: 128 LBS | HEART RATE: 72 BPM | BODY MASS INDEX: 23.55 KG/M2 | SYSTOLIC BLOOD PRESSURE: 120 MMHG | DIASTOLIC BLOOD PRESSURE: 50 MMHG | HEIGHT: 62 IN

## 2023-02-20 DIAGNOSIS — F41.9 ANXIETY DISORDER, UNSPECIFIED TYPE: ICD-10-CM

## 2023-02-20 DIAGNOSIS — E03.9 ACQUIRED HYPOTHYROIDISM: ICD-10-CM

## 2023-02-20 DIAGNOSIS — Z95.5 STENTED CORONARY ARTERY: ICD-10-CM

## 2023-02-20 DIAGNOSIS — I25.118 ATHEROSCLEROTIC HEART DISEASE OF NATIVE CORONARY ARTERY WITH OTHER FORMS OF ANGINA PECTORIS: ICD-10-CM

## 2023-02-20 DIAGNOSIS — F01.50 VASCULAR DEMENTIA WITHOUT BEHAVIORAL DISTURBANCE: ICD-10-CM

## 2023-02-20 DIAGNOSIS — I63.81 STROKE OF RIGHT BASAL GANGLIA: ICD-10-CM

## 2023-02-20 DIAGNOSIS — I95.1 ORTHOSTATIC HYPOTENSION: Primary | ICD-10-CM

## 2023-02-20 DIAGNOSIS — M81.0 AGE-RELATED OSTEOPOROSIS WITHOUT CURRENT PATHOLOGICAL FRACTURE: ICD-10-CM

## 2023-02-20 DIAGNOSIS — N95.1 MENOPAUSAL STATE: ICD-10-CM

## 2023-02-20 DIAGNOSIS — I10 ESSENTIAL HYPERTENSION: ICD-10-CM

## 2023-02-20 PROCEDURE — 99214 PR OFFICE/OUTPT VISIT, EST, LEVL IV, 30-39 MIN: ICD-10-PCS | Mod: S$GLB,,, | Performed by: FAMILY MEDICINE

## 2023-02-20 PROCEDURE — 99214 OFFICE O/P EST MOD 30 MIN: CPT | Mod: S$GLB,,, | Performed by: FAMILY MEDICINE

## 2023-02-20 RX ORDER — LOSARTAN POTASSIUM 50 MG/1
50 TABLET ORAL DAILY
Qty: 30 TABLET | Refills: 3 | Status: ON HOLD | OUTPATIENT
Start: 2023-02-20 | End: 2023-03-19 | Stop reason: HOSPADM

## 2023-02-21 NOTE — PROGRESS NOTES
SUBJECTIVE:    Patient ID: Mere Hoffmann is a 89 y.o. female.    Chief Complaint: Follow-up (No bottles, weakness, question about BP,   abc )    89-year-old female was having some orthostatic blood pressure syncope.  Her cardiologist  recommended adding midodrine 2.5 mg p.r.n. her daughter who is an RN has been monitoring her blood pressure closely.  In the mornings blood pressure can be high at 200/73 but in the afternoons does dropped down to 127/68.  She is been hesitant to start midodrine.  She does have orthostatic drops in her blood pressure when she sits or stands.  Lately blood pressure is been running in the 160s 170s all day.  Systolic.    Sedation-Dr. Pathak her neurologist has discontinued her Elavil as she was sleeping over 12 hours a day.  Now she is going to bed at 8-10 p.m. and awake at 10-11 am.  No more falls.    She has lost her drive to drink much or eat much.  She has some nausea in the afternoons.  She is on 2 meals per day.  Her urine output is adequate.      Lab Visit on 01/12/2023   Component Date Value Ref Range Status    Specimen UA 01/12/2023 Urine, Clean Catch   Final    Color, UA 01/12/2023 Yellow  Yellow, Straw, Makenna Final    Appearance, UA 01/12/2023 Clear  Clear Final    pH, UA 01/12/2023 8.0  5.0 - 8.0 Final    Specific Gravity, UA 01/12/2023 1.015  1.005 - 1.030 Final    Protein, UA 01/12/2023 Trace (A)  Negative Final    Glucose, UA 01/12/2023 Negative  Negative Final    Ketones, UA 01/12/2023 Negative  Negative Final    Bilirubin (UA) 01/12/2023 Negative  Negative Final    Occult Blood UA 01/12/2023 Negative  Negative Final    Nitrite, UA 01/12/2023 Negative  Negative Final    Urobilinogen, UA 01/12/2023 2.0-3.0 (A)  Negative EU/dL Final    Leukocytes, UA 01/12/2023 Negative  Negative Final   Lab Visit on 11/03/2022   Component Date Value Ref Range Status    Sodium 11/03/2022 140  136 - 145 mmol/L Final    Potassium 11/03/2022 4.4  3.5 - 5.1 mmol/L Final     Chloride 11/03/2022 108  95 - 110 mmol/L Final    CO2 11/03/2022 21 (L)  23 - 29 mmol/L Final    Glucose 11/03/2022 109  70 - 110 mg/dL Final    BUN 11/03/2022 19  8 - 23 mg/dL Final    Creatinine 11/03/2022 1.1  0.5 - 1.4 mg/dL Final    Calcium 11/03/2022 8.8  8.7 - 10.5 mg/dL Final    Total Protein 11/03/2022 6.7  6.0 - 8.4 g/dL Final    Albumin 11/03/2022 3.3 (L)  3.5 - 5.2 g/dL Final    Total Bilirubin 11/03/2022 0.8  0.1 - 1.0 mg/dL Final    Alkaline Phosphatase 11/03/2022 87  55 - 135 U/L Final    AST 11/03/2022 14  10 - 40 U/L Final    ALT 11/03/2022 15  10 - 44 U/L Final    Anion Gap 11/03/2022 11  8 - 16 mmol/L Final    eGFR 11/03/2022 48.0 (A)  >60 mL/min/1.73 m^2 Final    WBC 11/03/2022 8.36  3.90 - 12.70 K/uL Final    RBC 11/03/2022 3.92 (L)  4.00 - 5.40 M/uL Final    Hemoglobin 11/03/2022 12.1  12.0 - 16.0 g/dL Final    Hematocrit 11/03/2022 36.8 (L)  37.0 - 48.5 % Final    MCV 11/03/2022 94  82 - 98 fL Final    MCH 11/03/2022 30.9  27.0 - 31.0 pg Final    MCHC 11/03/2022 32.9  32.0 - 36.0 g/dL Final    RDW 11/03/2022 12.9  11.5 - 14.5 % Final    Platelets 11/03/2022 269  150 - 450 K/uL Final    MPV 11/03/2022 9.5  9.2 - 12.9 fL Final    Immature Granulocytes 11/03/2022 0.5  0.0 - 0.5 % Final    Gran # (ANC) 11/03/2022 6.2  1.8 - 7.7 K/uL Final    Immature Grans (Abs) 11/03/2022 0.04  0.00 - 0.04 K/uL Final    Lymph # 11/03/2022 1.4  1.0 - 4.8 K/uL Final    Mono # 11/03/2022 0.4  0.3 - 1.0 K/uL Final    Eos # 11/03/2022 0.3  0.0 - 0.5 K/uL Final    Baso # 11/03/2022 0.06  0.00 - 0.20 K/uL Final    nRBC 11/03/2022 0  0 /100 WBC Final    Gran % 11/03/2022 74.3 (H)  38.0 - 73.0 % Final    Lymph % 11/03/2022 16.5 (L)  18.0 - 48.0 % Final    Mono % 11/03/2022 4.5  4.0 - 15.0 % Final    Eosinophil % 11/03/2022 3.5  0.0 - 8.0 % Final    Basophil % 11/03/2022 0.7  0.0 - 1.9 % Final    Differential Method 11/03/2022 Automated   Final   Office Visit on 11/01/2022   Component Date Value Ref Range Status    POC  Rapid COVID 11/01/2022 Negative  Negative Final     Acceptable 11/01/2022 Yes   Final    POC Molecular Influenza A Ag 11/01/2022 Negative  Negative, Not Reported Final    POC Molecular Influenza B Ag 11/01/2022 Negative  Negative, Not Reported Final     Acceptable 11/01/2022 Yes   Final   Lab Visit on 09/27/2022   Component Date Value Ref Range Status    Sodium 09/27/2022 142  136 - 145 mmol/L Final    Potassium 09/27/2022 3.8  3.5 - 5.1 mmol/L Final    Chloride 09/27/2022 108  95 - 110 mmol/L Final    CO2 09/27/2022 24  23 - 29 mmol/L Final    Glucose 09/27/2022 99  70 - 110 mg/dL Final    BUN 09/27/2022 15  8 - 23 mg/dL Final    Creatinine 09/27/2022 0.9  0.5 - 1.4 mg/dL Final    Calcium 09/27/2022 8.9  8.7 - 10.5 mg/dL Final    Anion Gap 09/27/2022 10  8 - 16 mmol/L Final    eGFR 09/27/2022 >60.0  >60 mL/min/1.73 m^2 Final    Free T4 09/27/2022 1.09  0.71 - 1.51 ng/dL Final    TSH 09/27/2022 1.390  0.340 - 5.600 uIU/mL Final    Vit D, 25-Hydroxy 09/27/2022 47  30 - 96 ng/mL Final       Past Medical History:   Diagnosis Date    Hyperlipidemia     Hypertension      Social History     Socioeconomic History    Marital status:    Tobacco Use    Smoking status: Former    Smokeless tobacco: Never   Substance and Sexual Activity    Alcohol use: Not Currently    Drug use: Never     Past Surgical History:   Procedure Laterality Date    CARDIAC CATHETERIZATION      CHOLECYSTECTOMY      CORONARY ANGIOPLASTY      [facility] [place]    basal cell ca     Family History   Problem Relation Age of Onset    Heart disease Mother        Review of patient's allergies indicates:   Allergen Reactions    Penicillins     Sulfa (sulfonamide antibiotics)        Current Outpatient Medications:     ascorbate calcium (NEFTALI-C ORAL), Take 0.5 tablets by mouth once daily., Disp: , Rfl:     atorvastatin (LIPITOR) 40 MG tablet, Take 1 tablet (40 mg total) by mouth once daily., Disp: 90 tablet, Rfl: 1     carvediloL (COREG) 3.125 MG tablet, Take 1 tablet (3.125 mg total) by mouth 2 (two) times daily., Disp: 180 tablet, Rfl: 1    cholecalciferol, vitamin D3, (VITAMIN D3) 50 mcg (2,000 unit) Cap, Take 1 capsule by mouth once daily., Disp: , Rfl:     cyanocobalamin, vitamin B-12, 1,000 mcg TbSR, Take 1 tablet by mouth once daily., Disp: , Rfl:     dipyridamole-aspirin 200-25 mg (AGGRENOX)  mg CM12, TK 1 C PO BID, Disp: 180 capsule, Rfl: 1    famotidine (PEPCID) 20 MG tablet, Take 1 tablet (20 mg total) by mouth nightly as needed for Heartburn., Disp: 90 tablet, Rfl: 1    FLUoxetine 20 MG capsule, Take 1 capsule (20 mg total) by mouth once daily., Disp: 90 capsule, Rfl: 1    midodrine (PROAMATINE) 2.5 MG Tab, Take 1 tablet (2.5 mg total) by mouth every 12 (twelve) hours. for 15 days, Disp: 30 tablet, Rfl: 1    multivitamin capsule, Take 1 capsule by mouth once daily., Disp: , Rfl:     nitroGLYCERIN (NITROSTAT) 0.3 MG SL tablet, Place 0.3 mg under the tongue every 5 (five) minutes as needed for Chest pain., Disp: , Rfl:     pantoprazole (PROTONIX) 40 MG tablet, Take 1 tablet (40 mg total) by mouth once daily., Disp: 90 tablet, Rfl: 1    promethazine (PHENERGAN) 25 MG tablet, Take 1 tablet (25 mg total) by mouth every 6 (six) hours as needed for Nausea., Disp: 15 tablet, Rfl: 0    UNITHROID 75 mcg tablet, Take 1 tablet (75 mcg total) by mouth before breakfast., Disp: 90 tablet, Rfl: 1    amitriptyline (ELAVIL) 10 MG tablet, Take 2 tablets (20 mg total) by mouth nightly as needed for Insomnia., Disp: 180 tablet, Rfl: 1    clobetasoL (TEMOVATE) 0.05 % external solution, Apply to scalp 1-2x/day prn itching/scaling (Patient not taking: Reported on 7/14/2022), Disp: 50 mL, Rfl: 11    donepeziL (ARICEPT) 5 MG tablet, Take 1 tablet (5 mg total) by mouth every evening., Disp: 30 tablet, Rfl: 5    FLUoxetine 10 MG capsule, Take 1 capsule (10 mg total) by mouth once daily., Disp: 90 capsule, Rfl: 1    losartan (COZAAR) 50 MG  "tablet, Take 1 tablet (50 mg total) by mouth once daily. Prn systolic > 170, Disp: 30 tablet, Rfl: 3    Review of Systems   Constitutional:  Negative for appetite change, chills, fatigue, fever and unexpected weight change.   HENT:  Negative for congestion, ear pain, sinus pain, sore throat and trouble swallowing.    Eyes:  Negative for pain, discharge and visual disturbance.   Respiratory:  Negative for apnea, cough, shortness of breath and wheezing.    Cardiovascular:  Negative for chest pain, palpitations and leg swelling.   Gastrointestinal:  Negative for abdominal pain, blood in stool, constipation, diarrhea, nausea and vomiting.   Endocrine: Negative for heat intolerance, polydipsia and polyuria.   Genitourinary:  Negative for difficulty urinating, dyspareunia, dysuria, frequency, hematuria and menstrual problem.   Musculoskeletal:  Negative for arthralgias, back pain, gait problem, joint swelling and myalgias.   Allergic/Immunologic: Negative for environmental allergies, food allergies and immunocompromised state.   Neurological:  Positive for light-headedness (Orthostatic changes and dizziness). Negative for dizziness, tremors, seizures, numbness and headaches.   Psychiatric/Behavioral:  Positive for confusion (has vascular dementia.). Negative for behavioral problems, hallucinations and suicidal ideas. The patient is not nervous/anxious.         Objective:      Vitals:    02/20/23 1518 02/20/23 1524   BP: (!) 122/54 (!) 120/50   Pulse: 72    Weight: 58.1 kg (128 lb)    Height: 5' 2" (1.575 m)      Physical Exam  Vitals and nursing note reviewed.   Constitutional:       General: She is not in acute distress.     Appearance: Normal appearance. She is well-developed. She is not toxic-appearing.   HENT:      Head: Normocephalic and atraumatic.      Right Ear: Tympanic membrane and external ear normal.      Left Ear: Tympanic membrane and external ear normal.      Nose: Nose normal.      Mouth/Throat:      " Pharynx: Oropharynx is clear.   Eyes:      Pupils: Pupils are equal, round, and reactive to light.   Neck:      Thyroid: No thyromegaly.      Vascular: No carotid bruit.   Cardiovascular:      Rate and Rhythm: Normal rate and regular rhythm.      Heart sounds: Normal heart sounds. No murmur heard.  Pulmonary:      Effort: Pulmonary effort is normal.      Breath sounds: Normal breath sounds. No wheezing or rales.   Abdominal:      General: Bowel sounds are normal. There is no distension.      Palpations: Abdomen is soft.      Tenderness: There is no abdominal tenderness.   Musculoskeletal:         General: No tenderness or deformity. Normal range of motion.      Cervical back: Normal range of motion and neck supple.      Lumbar back: Normal. No spasms.      Comments: Bends 90 degrees at  waist shoulders and knees have good range of motion without crepitance.  No pitting edema to lower extremities.   Lymphadenopathy:      Cervical: No cervical adenopathy.   Skin:     General: Skin is warm and dry.      Findings: No rash.   Neurological:      Mental Status: She is alert and oriented to person, place, and time. Mental status is at baseline.      Cranial Nerves: No cranial nerve deficit.      Coordination: Coordination normal.      Comments: Poor short-term memory compatible  With vascular dementia.   Psychiatric:         Behavior: Behavior normal.         Thought Content: Thought content normal.         Judgment: Judgment normal.         Assessment:       1. Orthostatic hypotension    2. Essential hypertension    3. Vascular dementia without behavioral disturbance    4. Stroke of right basal ganglia    5. Anxiety disorder, unspecified type    6. Atherosclerotic heart disease of native coronary artery with other forms of angina pectoris    7. Stented coronary artery    8. Menopausal state    9. Acquired hypothyroidism    10. Age-related osteoporosis without current pathological fracture           Plan:       Orthostatic  hypotension    Essential hypertension  -     losartan (COZAAR) 50 MG tablet; Take 1 tablet (50 mg total) by mouth once daily. Prn systolic > 170  Dispense: 30 tablet; Refill: 3  -     CBC Auto Differential; Future; Expected date: 02/20/2023  -     Comprehensive Metabolic Panel; Future; Expected date: 02/20/2023  -     TSH w/reflex to FT4; Future; Expected date: 02/20/2023  -     Lipid Panel; Future; Expected date: 02/20/2023  -     CBC Auto Differential; Future; Expected date: 02/20/2023  -     Comprehensive Metabolic Panel; Future; Expected date: 02/20/2023  -     Lipid Panel; Future; Expected date: 02/20/2023  -     TSH; Future; Expected date: 02/20/2023  Patient's daughter will continue to monitor blood pressures at home.  If blood pressures at home are above 170 systolic chronically would suggest adding losartan 50 mg daily.  If pressure too low she is instructed to use the midodrine 2.5 mg p.r.n.  Vascular dementia without behavioral disturbance  Patient will cared for at home by her daughter.  Patient has short-term memory loss and is not independent.  Stroke of right basal ganglia    Anxiety disorder, unspecified type    Atherosclerotic heart disease of native coronary artery with other forms of angina pectoris    Stented coronary artery    Menopausal state    Acquired hypothyroidism    Age-related osteoporosis without current pathological fracture  Lab work ordered for 4 months.    Follow up in about 4 months (around 6/20/2023).        2/20/2023 Vidal Kraus

## 2023-03-16 ENCOUNTER — PATIENT MESSAGE (OUTPATIENT)
Dept: FAMILY MEDICINE | Facility: CLINIC | Age: 88
End: 2023-03-16

## 2023-03-16 RX ORDER — ONDANSETRON 4 MG/1
4 TABLET, ORALLY DISINTEGRATING ORAL EVERY 6 HOURS PRN
Qty: 40 TABLET | Refills: 2 | Status: SHIPPED | OUTPATIENT
Start: 2023-03-16

## 2023-03-17 ENCOUNTER — CLINICAL SUPPORT (OUTPATIENT)
Dept: CARDIOLOGY | Facility: HOSPITAL | Age: 88
End: 2023-03-17
Attending: INTERNAL MEDICINE
Payer: MEDICARE

## 2023-03-17 ENCOUNTER — HOSPITAL ENCOUNTER (OUTPATIENT)
Facility: HOSPITAL | Age: 88
Discharge: HOME HEALTH | End: 2023-03-19
Attending: EMERGENCY MEDICINE | Admitting: INTERNAL MEDICINE
Payer: MEDICARE

## 2023-03-17 VITALS — HEIGHT: 62 IN | BODY MASS INDEX: 23.55 KG/M2 | WEIGHT: 128 LBS

## 2023-03-17 DIAGNOSIS — R07.9 CHEST PAIN: ICD-10-CM

## 2023-03-17 DIAGNOSIS — I16.9 HYPERTENSIVE CRISIS: ICD-10-CM

## 2023-03-17 DIAGNOSIS — R55 SYNCOPE: ICD-10-CM

## 2023-03-17 DIAGNOSIS — S09.90XA CLOSED HEAD INJURY, INITIAL ENCOUNTER: ICD-10-CM

## 2023-03-17 DIAGNOSIS — I95.1 ORTHOSTATIC HYPOTENSION: Primary | ICD-10-CM

## 2023-03-17 PROBLEM — I16.0 HYPERTENSIVE URGENCY: Status: ACTIVE | Noted: 2023-03-17

## 2023-03-17 LAB
ALBUMIN SERPL BCP-MCNC: 3.7 G/DL (ref 3.5–5.2)
ALP SERPL-CCNC: 79 U/L (ref 55–135)
ALT SERPL W/O P-5'-P-CCNC: 14 U/L (ref 10–44)
ANION GAP SERPL CALC-SCNC: 13 MMOL/L (ref 8–16)
ANION GAP SERPL CALC-SCNC: 9 MMOL/L (ref 8–16)
AST SERPL-CCNC: 21 U/L (ref 10–40)
AV INDEX (PROSTH): 0.65
AV MEAN GRADIENT: 5 MMHG
AV PEAK GRADIENT: 9 MMHG
AV REGURGITATION PRESSURE HALF TIME: 528 MS
AV VALVE AREA: 2.05 CM2
AV VELOCITY RATIO: 0.65
BASOPHILS # BLD AUTO: 0.04 K/UL (ref 0–0.2)
BASOPHILS # BLD AUTO: 0.05 K/UL (ref 0–0.2)
BASOPHILS NFR BLD: 0.4 % (ref 0–1.9)
BASOPHILS NFR BLD: 0.5 % (ref 0–1.9)
BILIRUB SERPL-MCNC: 1.1 MG/DL (ref 0.1–1)
BILIRUB UR QL STRIP: NEGATIVE
BNP SERPL-MCNC: 63 PG/ML (ref 0–99)
BSA FOR ECHO PROCEDURE: 1.6 M2
BUN SERPL-MCNC: 15 MG/DL (ref 8–23)
BUN SERPL-MCNC: 16 MG/DL (ref 8–23)
CALCIUM SERPL-MCNC: 8.3 MG/DL (ref 8.7–10.5)
CALCIUM SERPL-MCNC: 8.7 MG/DL (ref 8.7–10.5)
CHLORIDE SERPL-SCNC: 102 MMOL/L (ref 95–110)
CHLORIDE SERPL-SCNC: 104 MMOL/L (ref 95–110)
CLARITY UR: CLEAR
CO2 SERPL-SCNC: 22 MMOL/L (ref 23–29)
CO2 SERPL-SCNC: 24 MMOL/L (ref 23–29)
COLOR UR: YELLOW
CREAT SERPL-MCNC: 0.8 MG/DL (ref 0.5–1.4)
CREAT SERPL-MCNC: 0.8 MG/DL (ref 0.5–1.4)
CV ECHO LV RWT: 0.49 CM
DIFFERENTIAL METHOD: ABNORMAL
DIFFERENTIAL METHOD: ABNORMAL
DOP CALC AO PEAK VEL: 1.49 M/S
DOP CALC AO VTI: 32.7 CM
DOP CALC LVOT AREA: 3.1 CM2
DOP CALC LVOT DIAMETER: 2 CM
DOP CALC LVOT PEAK VEL: 0.97 M/S
DOP CALC LVOT STROKE VOLUME: 66.88 CM3
DOP CALC MV VTI: 27.4 CM
DOP CALCLVOT PEAK VEL VTI: 21.3 CM
E WAVE DECELERATION TIME: 275 MSEC
E/A RATIO: 0.58
E/E' RATIO: 13.78 M/S
ECHO LV POSTERIOR WALL: 1.03 CM (ref 0.6–1.1)
EJECTION FRACTION: 65 %
EOSINOPHIL # BLD AUTO: 0 K/UL (ref 0–0.5)
EOSINOPHIL # BLD AUTO: 0.1 K/UL (ref 0–0.5)
EOSINOPHIL NFR BLD: 0.2 % (ref 0–8)
EOSINOPHIL NFR BLD: 1.1 % (ref 0–8)
ERYTHROCYTE [DISTWIDTH] IN BLOOD BY AUTOMATED COUNT: 12.8 % (ref 11.5–14.5)
ERYTHROCYTE [DISTWIDTH] IN BLOOD BY AUTOMATED COUNT: 12.8 % (ref 11.5–14.5)
EST. GFR  (NO RACE VARIABLE): >60 ML/MIN/1.73 M^2
EST. GFR  (NO RACE VARIABLE): >60 ML/MIN/1.73 M^2
FRACTIONAL SHORTENING: 34 % (ref 28–44)
GLUCOSE SERPL-MCNC: 108 MG/DL (ref 70–110)
GLUCOSE SERPL-MCNC: 116 MG/DL (ref 70–110)
GLUCOSE UR QL STRIP: NEGATIVE
HCT VFR BLD AUTO: 36.7 % (ref 37–48.5)
HCT VFR BLD AUTO: 40.1 % (ref 37–48.5)
HGB BLD-MCNC: 12.3 G/DL (ref 12–16)
HGB BLD-MCNC: 13.2 G/DL (ref 12–16)
HGB UR QL STRIP: NEGATIVE
IMM GRANULOCYTES # BLD AUTO: 0.04 K/UL (ref 0–0.04)
IMM GRANULOCYTES # BLD AUTO: 0.05 K/UL (ref 0–0.04)
IMM GRANULOCYTES NFR BLD AUTO: 0.4 % (ref 0–0.5)
IMM GRANULOCYTES NFR BLD AUTO: 0.5 % (ref 0–0.5)
INR PPP: 1 (ref 0.8–1.2)
INTERVENTRICULAR SEPTUM: 1.11 CM (ref 0.6–1.1)
IVC DIAMETER: 1.02 CM
KETONES UR QL STRIP: ABNORMAL
LEFT ATRIUM SIZE: 2.8 CM
LEFT ATRIUM VOLUME INDEX MOD: 29.9 ML/M2
LEFT ATRIUM VOLUME MOD: 47.3 CM3
LEFT INTERNAL DIMENSION IN SYSTOLE: 2.77 CM (ref 2.1–4)
LEFT VENTRICLE DIASTOLIC VOLUME INDEX: 40.82 ML/M2
LEFT VENTRICLE DIASTOLIC VOLUME: 64.5 ML
LEFT VENTRICLE MASS INDEX: 96 G/M2
LEFT VENTRICLE SYSTOLIC VOLUME INDEX: 18.2 ML/M2
LEFT VENTRICLE SYSTOLIC VOLUME: 28.8 ML
LEFT VENTRICULAR INTERNAL DIMENSION IN DIASTOLE: 4.22 CM (ref 3.5–6)
LEFT VENTRICULAR MASS: 152.11 G
LEUKOCYTE ESTERASE UR QL STRIP: NEGATIVE
LV LATERAL E/E' RATIO: 15.5 M/S
LV SEPTAL E/E' RATIO: 12.4 M/S
LVOT MG: 2 MMHG
LVOT MV: 0.62 CM/S
LYMPHOCYTES # BLD AUTO: 1 K/UL (ref 1–4.8)
LYMPHOCYTES # BLD AUTO: 1.9 K/UL (ref 1–4.8)
LYMPHOCYTES NFR BLD: 10.5 % (ref 18–48)
LYMPHOCYTES NFR BLD: 17.7 % (ref 18–48)
MAGNESIUM SERPL-MCNC: 1.9 MG/DL (ref 1.6–2.6)
MCH RBC QN AUTO: 29.1 PG (ref 27–31)
MCH RBC QN AUTO: 29.6 PG (ref 27–31)
MCHC RBC AUTO-ENTMCNC: 32.9 G/DL (ref 32–36)
MCHC RBC AUTO-ENTMCNC: 33.5 G/DL (ref 32–36)
MCV RBC AUTO: 88 FL (ref 82–98)
MCV RBC AUTO: 89 FL (ref 82–98)
MONOCYTES # BLD AUTO: 0.5 K/UL (ref 0.3–1)
MONOCYTES # BLD AUTO: 0.7 K/UL (ref 0.3–1)
MONOCYTES NFR BLD: 4.9 % (ref 4–15)
MONOCYTES NFR BLD: 6.1 % (ref 4–15)
MV MEAN GRADIENT: 1 MMHG
MV PEAK A VEL: 1.07 M/S
MV PEAK E VEL: 0.62 M/S
MV PEAK GRADIENT: 5 MMHG
MV STENOSIS PRESSURE HALF TIME: 152 MS
MV VALVE AREA BY CONTINUITY EQUATION: 2.44 CM2
MV VALVE AREA P 1/2 METHOD: 1.45 CM2
NEUTROPHILS # BLD AUTO: 8 K/UL (ref 1.8–7.7)
NEUTROPHILS # BLD AUTO: 8.3 K/UL (ref 1.8–7.7)
NEUTROPHILS NFR BLD: 74.2 % (ref 38–73)
NEUTROPHILS NFR BLD: 83.5 % (ref 38–73)
NITRITE UR QL STRIP: NEGATIVE
NRBC BLD-RTO: 0 /100 WBC
NRBC BLD-RTO: 0 /100 WBC
PH UR STRIP: 8 [PH] (ref 5–8)
PISA AR MAX VEL: 2.92 M/S
PISA TR MAX VEL: 2.2 M/S
PLATELET # BLD AUTO: 194 K/UL (ref 150–450)
PLATELET # BLD AUTO: 224 K/UL (ref 150–450)
PMV BLD AUTO: 9.3 FL (ref 9.2–12.9)
PMV BLD AUTO: 9.7 FL (ref 9.2–12.9)
POTASSIUM SERPL-SCNC: 3.2 MMOL/L (ref 3.5–5.1)
POTASSIUM SERPL-SCNC: 3.5 MMOL/L (ref 3.5–5.1)
PROT SERPL-MCNC: 6.9 G/DL (ref 6–8.4)
PROT UR QL STRIP: ABNORMAL
PROTHROMBIN TIME: 10.7 SEC (ref 9–12.5)
PV MV: 0.61 M/S
PV PEAK VELOCITY: 0.89 CM/S
RA PRESSURE: 3 MMHG
RBC # BLD AUTO: 4.16 M/UL (ref 4–5.4)
RBC # BLD AUTO: 4.53 M/UL (ref 4–5.4)
RV TISSUE DOPPLER FREE WALL SYSTOLIC VELOCITY 1 (APICAL 4 CHAMBER VIEW): 0.01 CM/S
SODIUM SERPL-SCNC: 137 MMOL/L (ref 136–145)
SODIUM SERPL-SCNC: 137 MMOL/L (ref 136–145)
SP GR UR STRIP: 1.01 (ref 1–1.03)
STJ: 1.87 CM
TDI LATERAL: 0.04 M/S
TDI SEPTAL: 0.05 M/S
TDI: 0.05 M/S
TR MAX PG: 19 MMHG
TRICUSPID ANNULAR PLANE SYSTOLIC EXCURSION: 1.61 CM
TROPONIN I SERPL HS-MCNC: 10.5 PG/ML (ref 0–14.9)
TROPONIN I SERPL HS-MCNC: 9.2 PG/ML (ref 0–14.9)
TSH SERPL DL<=0.005 MIU/L-ACNC: 3.24 UIU/ML (ref 0.34–5.6)
TV REST PULMONARY ARTERY PRESSURE: 22 MMHG
URN SPEC COLLECT METH UR: ABNORMAL
UROBILINOGEN UR STRIP-ACNC: NEGATIVE EU/DL
WBC # BLD AUTO: 10.82 K/UL (ref 3.9–12.7)
WBC # BLD AUTO: 9.94 K/UL (ref 3.9–12.7)

## 2023-03-17 PROCEDURE — 81003 URINALYSIS AUTO W/O SCOPE: CPT | Performed by: INTERNAL MEDICINE

## 2023-03-17 PROCEDURE — 82384 ASSAY THREE CATECHOLAMINES: CPT | Performed by: INTERNAL MEDICINE

## 2023-03-17 PROCEDURE — G0378 HOSPITAL OBSERVATION PER HR: HCPCS

## 2023-03-17 PROCEDURE — 93010 ELECTROCARDIOGRAM REPORT: CPT | Mod: ,,, | Performed by: INTERNAL MEDICINE

## 2023-03-17 PROCEDURE — 83735 ASSAY OF MAGNESIUM: CPT | Performed by: EMERGENCY MEDICINE

## 2023-03-17 PROCEDURE — 96365 THER/PROPH/DIAG IV INF INIT: CPT

## 2023-03-17 PROCEDURE — 80048 BASIC METABOLIC PNL TOTAL CA: CPT | Performed by: INTERNAL MEDICINE

## 2023-03-17 PROCEDURE — 63600175 PHARM REV CODE 636 W HCPCS: Performed by: HOSPITALIST

## 2023-03-17 PROCEDURE — 84484 ASSAY OF TROPONIN QUANT: CPT | Performed by: EMERGENCY MEDICINE

## 2023-03-17 PROCEDURE — 96375 TX/PRO/DX INJ NEW DRUG ADDON: CPT

## 2023-03-17 PROCEDURE — 84443 ASSAY THYROID STIM HORMONE: CPT | Performed by: EMERGENCY MEDICINE

## 2023-03-17 PROCEDURE — 25000003 PHARM REV CODE 250: Performed by: GENERAL PRACTICE

## 2023-03-17 PROCEDURE — 85025 COMPLETE CBC W/AUTO DIFF WBC: CPT | Mod: 91 | Performed by: EMERGENCY MEDICINE

## 2023-03-17 PROCEDURE — 25000003 PHARM REV CODE 250: Performed by: STUDENT IN AN ORGANIZED HEALTH CARE EDUCATION/TRAINING PROGRAM

## 2023-03-17 PROCEDURE — 82088 ASSAY OF ALDOSTERONE: CPT | Performed by: INTERNAL MEDICINE

## 2023-03-17 PROCEDURE — 85610 PROTHROMBIN TIME: CPT | Performed by: EMERGENCY MEDICINE

## 2023-03-17 PROCEDURE — 97161 PT EVAL LOW COMPLEX 20 MIN: CPT

## 2023-03-17 PROCEDURE — 93306 TTE W/DOPPLER COMPLETE: CPT | Mod: 26,,, | Performed by: INTERNAL MEDICINE

## 2023-03-17 PROCEDURE — 93306 TTE W/DOPPLER COMPLETE: CPT

## 2023-03-17 PROCEDURE — 25000003 PHARM REV CODE 250: Performed by: INTERNAL MEDICINE

## 2023-03-17 PROCEDURE — 36415 COLL VENOUS BLD VENIPUNCTURE: CPT | Performed by: EMERGENCY MEDICINE

## 2023-03-17 PROCEDURE — 63600175 PHARM REV CODE 636 W HCPCS: Performed by: STUDENT IN AN ORGANIZED HEALTH CARE EDUCATION/TRAINING PROGRAM

## 2023-03-17 PROCEDURE — 99223 1ST HOSP IP/OBS HIGH 75: CPT | Mod: ,,, | Performed by: GENERAL PRACTICE

## 2023-03-17 PROCEDURE — 85025 COMPLETE CBC W/AUTO DIFF WBC: CPT | Performed by: INTERNAL MEDICINE

## 2023-03-17 PROCEDURE — 93306 ECHO (CUPID ONLY): ICD-10-PCS | Mod: 26,,, | Performed by: INTERNAL MEDICINE

## 2023-03-17 PROCEDURE — 80053 COMPREHEN METABOLIC PANEL: CPT | Performed by: EMERGENCY MEDICINE

## 2023-03-17 PROCEDURE — 93005 ELECTROCARDIOGRAM TRACING: CPT | Performed by: INTERNAL MEDICINE

## 2023-03-17 PROCEDURE — 25000003 PHARM REV CODE 250: Performed by: EMERGENCY MEDICINE

## 2023-03-17 PROCEDURE — 63600175 PHARM REV CODE 636 W HCPCS: Performed by: EMERGENCY MEDICINE

## 2023-03-17 PROCEDURE — 99223 PR INITIAL HOSPITAL CARE,LEVL III: ICD-10-PCS | Mod: ,,, | Performed by: GENERAL PRACTICE

## 2023-03-17 PROCEDURE — 97535 SELF CARE MNGMENT TRAINING: CPT

## 2023-03-17 PROCEDURE — 93010 EKG 12-LEAD: ICD-10-PCS | Mod: ,,, | Performed by: INTERNAL MEDICINE

## 2023-03-17 PROCEDURE — 99285 EMERGENCY DEPT VISIT HI MDM: CPT | Mod: 25

## 2023-03-17 PROCEDURE — 97530 THERAPEUTIC ACTIVITIES: CPT

## 2023-03-17 PROCEDURE — 83880 ASSAY OF NATRIURETIC PEPTIDE: CPT | Performed by: EMERGENCY MEDICINE

## 2023-03-17 PROCEDURE — 97165 OT EVAL LOW COMPLEX 30 MIN: CPT

## 2023-03-17 PROCEDURE — 84244 ASSAY OF RENIN: CPT | Performed by: INTERNAL MEDICINE

## 2023-03-17 PROCEDURE — 25000003 PHARM REV CODE 250: Performed by: HOSPITALIST

## 2023-03-17 RX ORDER — HEPARIN SODIUM 5000 [USP'U]/ML
5000 INJECTION, SOLUTION INTRAVENOUS; SUBCUTANEOUS EVERY 12 HOURS
Status: DISCONTINUED | OUTPATIENT
Start: 2023-03-17 | End: 2023-03-17

## 2023-03-17 RX ORDER — HYDRALAZINE HYDROCHLORIDE 20 MG/ML
5 INJECTION INTRAMUSCULAR; INTRAVENOUS
Status: COMPLETED | OUTPATIENT
Start: 2023-03-17 | End: 2023-03-17

## 2023-03-17 RX ORDER — FLUOXETINE HYDROCHLORIDE 20 MG/1
20 CAPSULE ORAL DAILY
Status: DISCONTINUED | OUTPATIENT
Start: 2023-03-17 | End: 2023-03-19 | Stop reason: HOSPADM

## 2023-03-17 RX ORDER — LEVOTHYROXINE SODIUM 25 UG/1
75 TABLET ORAL
Status: DISCONTINUED | OUTPATIENT
Start: 2023-03-17 | End: 2023-03-19 | Stop reason: HOSPADM

## 2023-03-17 RX ORDER — SODIUM,POTASSIUM PHOSPHATES 280-250MG
2 POWDER IN PACKET (EA) ORAL
Status: DISCONTINUED | OUTPATIENT
Start: 2023-03-17 | End: 2023-03-19 | Stop reason: HOSPADM

## 2023-03-17 RX ORDER — LANOLIN ALCOHOL/MO/W.PET/CERES
800 CREAM (GRAM) TOPICAL
Status: DISCONTINUED | OUTPATIENT
Start: 2023-03-17 | End: 2023-03-19 | Stop reason: HOSPADM

## 2023-03-17 RX ORDER — DENOSUMAB 60 MG/ML
60 INJECTION SUBCUTANEOUS
COMMUNITY

## 2023-03-17 RX ORDER — ATORVASTATIN CALCIUM 40 MG/1
40 TABLET, FILM COATED ORAL DAILY
Status: DISCONTINUED | OUTPATIENT
Start: 2023-03-17 | End: 2023-03-19 | Stop reason: HOSPADM

## 2023-03-17 RX ORDER — ALBUMIN HUMAN 250 G/1000ML
12.5 SOLUTION INTRAVENOUS ONCE AS NEEDED
Status: DISCONTINUED | OUTPATIENT
Start: 2023-03-17 | End: 2023-03-19 | Stop reason: HOSPADM

## 2023-03-17 RX ORDER — CARBOXYMETHYLCELLULOSE SODIUM 5 MG/ML
2 SOLUTION/ DROPS OPHTHALMIC 4 TIMES DAILY PRN
Status: DISCONTINUED | OUTPATIENT
Start: 2023-03-17 | End: 2023-03-19 | Stop reason: HOSPADM

## 2023-03-17 RX ORDER — DIPHENOXYLATE HYDROCHLORIDE AND ATROPINE SULFATE 2.5; .025 MG/1; MG/1
1 TABLET ORAL 4 TIMES DAILY PRN
COMMUNITY

## 2023-03-17 RX ORDER — FLUDROCORTISONE ACETATE 0.1 MG/1
100 TABLET ORAL DAILY
Status: DISCONTINUED | OUTPATIENT
Start: 2023-03-17 | End: 2023-03-17

## 2023-03-17 RX ORDER — FLUDROCORTISONE ACETATE 0.1 MG/1
100 TABLET ORAL 2 TIMES DAILY
Status: DISCONTINUED | OUTPATIENT
Start: 2023-03-17 | End: 2023-03-19 | Stop reason: HOSPADM

## 2023-03-17 RX ORDER — LANOLIN ALCOHOL/MO/W.PET/CERES
1000 CREAM (GRAM) TOPICAL DAILY
Status: DISCONTINUED | OUTPATIENT
Start: 2023-03-17 | End: 2023-03-19 | Stop reason: HOSPADM

## 2023-03-17 RX ORDER — ONDANSETRON 2 MG/ML
4 INJECTION INTRAMUSCULAR; INTRAVENOUS EVERY 6 HOURS PRN
Status: DISCONTINUED | OUTPATIENT
Start: 2023-03-17 | End: 2023-03-19 | Stop reason: HOSPADM

## 2023-03-17 RX ORDER — ALBUMIN HUMAN 250 G/1000ML
12.5 SOLUTION INTRAVENOUS ONCE
Status: DISCONTINUED | OUTPATIENT
Start: 2023-03-17 | End: 2023-03-17

## 2023-03-17 RX ORDER — IBUPROFEN 200 MG
24 TABLET ORAL
Status: DISCONTINUED | OUTPATIENT
Start: 2023-03-17 | End: 2023-03-19 | Stop reason: HOSPADM

## 2023-03-17 RX ORDER — GLUCAGON 1 MG
1 KIT INJECTION
Status: DISCONTINUED | OUTPATIENT
Start: 2023-03-17 | End: 2023-03-19 | Stop reason: HOSPADM

## 2023-03-17 RX ORDER — NALOXONE HCL 0.4 MG/ML
0.02 VIAL (ML) INJECTION
Status: DISCONTINUED | OUTPATIENT
Start: 2023-03-17 | End: 2023-03-19 | Stop reason: HOSPADM

## 2023-03-17 RX ORDER — AMLODIPINE BESYLATE 2.5 MG/1
2.5 TABLET ORAL DAILY
Status: DISCONTINUED | OUTPATIENT
Start: 2023-03-18 | End: 2023-03-18

## 2023-03-17 RX ORDER — CARBOXYMETHYLCELLULOSE SODIUM 5 MG/ML
2 SOLUTION/ DROPS OPHTHALMIC
Status: DISCONTINUED | OUTPATIENT
Start: 2023-03-17 | End: 2023-03-19 | Stop reason: HOSPADM

## 2023-03-17 RX ORDER — MAGNESIUM SULFATE HEPTAHYDRATE 40 MG/ML
4 INJECTION, SOLUTION INTRAVENOUS
Status: DISCONTINUED | OUTPATIENT
Start: 2023-03-17 | End: 2023-03-19 | Stop reason: HOSPADM

## 2023-03-17 RX ORDER — SODIUM CHLORIDE 0.9 % (FLUSH) 0.9 %
10 SYRINGE (ML) INJECTION EVERY 12 HOURS PRN
Status: DISCONTINUED | OUTPATIENT
Start: 2023-03-17 | End: 2023-03-19 | Stop reason: HOSPADM

## 2023-03-17 RX ORDER — POTASSIUM CHLORIDE 20 MEQ/1
40 TABLET, EXTENDED RELEASE ORAL ONCE
Status: COMPLETED | OUTPATIENT
Start: 2023-03-17 | End: 2023-03-17

## 2023-03-17 RX ORDER — FLUDROCORTISONE ACETATE 0.1 MG/1
100 TABLET ORAL DAILY
Status: DISCONTINUED | OUTPATIENT
Start: 2023-03-18 | End: 2023-03-17

## 2023-03-17 RX ORDER — ONDANSETRON 4 MG/1
4 TABLET, ORALLY DISINTEGRATING ORAL EVERY 6 HOURS PRN
Status: DISCONTINUED | OUTPATIENT
Start: 2023-03-17 | End: 2023-03-17

## 2023-03-17 RX ORDER — ASPIRIN AND DIPYRIDAMOLE 25; 200 MG/1; MG/1
1 CAPSULE, EXTENDED RELEASE ORAL 2 TIMES DAILY
Status: DISCONTINUED | OUTPATIENT
Start: 2023-03-17 | End: 2023-03-19 | Stop reason: HOSPADM

## 2023-03-17 RX ORDER — ONDANSETRON 2 MG/ML
4 INJECTION INTRAMUSCULAR; INTRAVENOUS EVERY 6 HOURS PRN
Status: DISCONTINUED | OUTPATIENT
Start: 2023-03-17 | End: 2023-03-17

## 2023-03-17 RX ORDER — CYCLOSPORINE 0.5 MG/ML
1 EMULSION OPHTHALMIC 2 TIMES DAILY
COMMUNITY
Start: 2023-03-14

## 2023-03-17 RX ORDER — LABETALOL HYDROCHLORIDE 5 MG/ML
5 INJECTION, SOLUTION INTRAVENOUS EVERY 4 HOURS PRN
Status: DISCONTINUED | OUTPATIENT
Start: 2023-03-17 | End: 2023-03-17

## 2023-03-17 RX ORDER — HYDRALAZINE HYDROCHLORIDE 20 MG/ML
5 INJECTION INTRAMUSCULAR; INTRAVENOUS EVERY 6 HOURS PRN
Status: DISCONTINUED | OUTPATIENT
Start: 2023-03-17 | End: 2023-03-19 | Stop reason: HOSPADM

## 2023-03-17 RX ORDER — MAGNESIUM SULFATE HEPTAHYDRATE 40 MG/ML
2 INJECTION, SOLUTION INTRAVENOUS
Status: DISCONTINUED | OUTPATIENT
Start: 2023-03-17 | End: 2023-03-19 | Stop reason: HOSPADM

## 2023-03-17 RX ORDER — LOSARTAN POTASSIUM 25 MG/1
25 TABLET ORAL NIGHTLY
Status: DISCONTINUED | OUTPATIENT
Start: 2023-03-17 | End: 2023-03-18

## 2023-03-17 RX ORDER — PANTOPRAZOLE SODIUM 40 MG/1
40 TABLET, DELAYED RELEASE ORAL 2 TIMES DAILY
Status: DISCONTINUED | OUTPATIENT
Start: 2023-03-17 | End: 2023-03-19 | Stop reason: HOSPADM

## 2023-03-17 RX ORDER — CARVEDILOL 3.12 MG/1
3.12 TABLET ORAL 2 TIMES DAILY
Status: DISCONTINUED | OUTPATIENT
Start: 2023-03-17 | End: 2023-03-17

## 2023-03-17 RX ORDER — ACETAMINOPHEN 500 MG
1000 TABLET ORAL
Status: COMPLETED | OUTPATIENT
Start: 2023-03-17 | End: 2023-03-17

## 2023-03-17 RX ORDER — IBUPROFEN 200 MG
16 TABLET ORAL
Status: DISCONTINUED | OUTPATIENT
Start: 2023-03-17 | End: 2023-03-19 | Stop reason: HOSPADM

## 2023-03-17 RX ORDER — ONDANSETRON 2 MG/ML
4 INJECTION INTRAMUSCULAR; INTRAVENOUS
Status: COMPLETED | OUTPATIENT
Start: 2023-03-17 | End: 2023-03-17

## 2023-03-17 RX ORDER — AMLODIPINE BESYLATE 2.5 MG/1
2.5 TABLET ORAL NIGHTLY
Status: DISCONTINUED | OUTPATIENT
Start: 2023-03-17 | End: 2023-03-17

## 2023-03-17 RX ADMIN — POTASSIUM CHLORIDE 40 MEQ: 1500 TABLET, EXTENDED RELEASE ORAL at 09:03

## 2023-03-17 RX ADMIN — FLUOXETINE 20 MG: 20 CAPSULE ORAL at 09:03

## 2023-03-17 RX ADMIN — ONDANSETRON 4 MG: 4 TABLET, ORALLY DISINTEGRATING ORAL at 08:03

## 2023-03-17 RX ADMIN — HYDRALAZINE HYDROCHLORIDE 5 MG: 20 INJECTION INTRAMUSCULAR; INTRAVENOUS at 09:03

## 2023-03-17 RX ADMIN — LOSARTAN POTASSIUM 25 MG: 25 TABLET, FILM COATED ORAL at 08:03

## 2023-03-17 RX ADMIN — HYDRALAZINE HYDROCHLORIDE 5 MG: 20 INJECTION INTRAMUSCULAR; INTRAVENOUS at 02:03

## 2023-03-17 RX ADMIN — CARBOXYMETHYLCELLULOSE SODIUM 2 DROP: 0.5 SOLUTION, GEL FORMING / DROPS OPHTHALMIC at 09:03

## 2023-03-17 RX ADMIN — FLUDROCORTISONE ACETATE 100 MCG: 0.1 TABLET ORAL at 05:03

## 2023-03-17 RX ADMIN — PANTOPRAZOLE SODIUM 40 MG: 40 TABLET, DELAYED RELEASE ORAL at 05:03

## 2023-03-17 RX ADMIN — CARBOXYMETHYLCELLULOSE SODIUM 2 DROP: 0.5 SOLUTION, GEL FORMING / DROPS OPHTHALMIC at 01:03

## 2023-03-17 RX ADMIN — ACETAMINOPHEN 1000 MG: 500 TABLET ORAL at 01:03

## 2023-03-17 RX ADMIN — PANTOPRAZOLE SODIUM 40 MG: 40 TABLET, DELAYED RELEASE ORAL at 06:03

## 2023-03-17 RX ADMIN — CARBOXYMETHYLCELLULOSE SODIUM 2 DROP: 0.5 SOLUTION, GEL FORMING / DROPS OPHTHALMIC at 06:03

## 2023-03-17 RX ADMIN — PROMETHAZINE HYDROCHLORIDE 6.25 MG: 25 INJECTION INTRAMUSCULAR; INTRAVENOUS at 11:03

## 2023-03-17 RX ADMIN — LEVOTHYROXINE SODIUM 75 MCG: 0.03 TABLET ORAL at 06:03

## 2023-03-17 RX ADMIN — ASPIRIN AND EXTENDED-RELEASE DIPYRIDAMOLE 1 CAPSULE: 25; 200 CAPSULE ORAL at 08:03

## 2023-03-17 RX ADMIN — CARVEDILOL 3.12 MG: 3.12 TABLET, FILM COATED ORAL at 09:03

## 2023-03-17 RX ADMIN — CYANOCOBALAMIN TAB 1000 MCG 1000 MCG: 1000 TAB at 09:03

## 2023-03-17 RX ADMIN — HYDRALAZINE HYDROCHLORIDE 5 MG: 20 INJECTION INTRAMUSCULAR; INTRAVENOUS at 07:03

## 2023-03-17 RX ADMIN — ONDANSETRON 4 MG: 2 INJECTION INTRAMUSCULAR; INTRAVENOUS at 02:03

## 2023-03-17 RX ADMIN — ATORVASTATIN CALCIUM 40 MG: 40 TABLET, FILM COATED ORAL at 09:03

## 2023-03-17 NOTE — ED PROVIDER NOTES
Encounter Date: 3/17/2023       History     Chief Complaint   Patient presents with    Fall     No LOC. + blood thinners     Patient presents emergency department with reported unwitnessed fall at home daughter heard patient fall found her face down on floor patient reports that she got up to go to the bathroom and then struck the ground she has been having difficulty with orthostasis daughter reports that she is been having severely elevated blood pressure while lying down pressures were dropping into the 80s and 90s when she stands she was prescribed midodrine by her primary care provider also given prescription for blood pressure medications but has not been taking either additional medications given the lability of her pressures she does complain of headache, nausea and has been vomiting in the emergency department      Review of patient's allergies indicates:   Allergen Reactions    Penicillins     Sulfa (sulfonamide antibiotics)      Past Medical History:   Diagnosis Date    Hyperlipidemia     Hypertension      Past Surgical History:   Procedure Laterality Date    CARDIAC CATHETERIZATION      CHOLECYSTECTOMY      CORONARY ANGIOPLASTY      moh's surgery  2019    basal cell ca     Family History   Problem Relation Age of Onset    Heart disease Mother      Social History     Tobacco Use    Smoking status: Former    Smokeless tobacco: Never   Substance Use Topics    Alcohol use: Not Currently    Drug use: Never     Review of Systems   Constitutional:  Negative for chills and fever.   Musculoskeletal:  Positive for neck pain. Negative for back pain.   Skin:  Positive for wound.   Neurological:  Positive for headaches.   All other systems reviewed and are negative.    Physical Exam     Initial Vitals [03/17/23 0055]   BP Pulse Resp Temp SpO2   (!) 220/90 63 20 98 °F (36.7 °C) 99 %      MAP       --         Physical Exam    Constitutional: She appears well-developed and well-nourished. No distress.   HENT:   Head:  Normocephalic and atraumatic.   Right Ear: External ear normal.   Left Ear: External ear normal.   Mouth/Throat: Oropharynx is clear and moist.   TMs clear bilaterally   Eyes: EOM are normal. Pupils are equal, round, and reactive to light.   Neck: Neck supple.   No midline spinal tenderness or step-off   Normal range of motion.  Cardiovascular:  Normal rate, regular rhythm, normal heart sounds and intact distal pulses.           Pulmonary/Chest: Breath sounds normal. No respiratory distress.   Abdominal: Abdomen is soft. Bowel sounds are normal. There is no abdominal tenderness.   Musculoskeletal:         General: Normal range of motion.      Cervical back: Normal range of motion and neck supple.     Neurological: She is alert. She has normal strength. No cranial nerve deficit. GCS score is 15. GCS eye subscore is 4. GCS verbal subscore is 5. GCS motor subscore is 6.   Skin: Skin is warm and dry. Capillary refill takes less than 2 seconds.   Psychiatric: She has a normal mood and affect. Her behavior is normal.       ED Course   Procedures  Labs Reviewed   CBC W/ AUTO DIFFERENTIAL - Abnormal; Notable for the following components:       Result Value    Gran # (ANC) 8.0 (*)     Gran % 74.2 (*)     Lymph % 17.7 (*)     All other components within normal limits   COMPREHENSIVE METABOLIC PANEL - Abnormal; Notable for the following components:    CO2 22 (*)     Total Bilirubin 1.1 (*)     All other components within normal limits   B-TYPE NATRIURETIC PEPTIDE   TROPONIN I HIGH SENSITIVITY   PROTIME-INR   TROPONIN I HIGH SENSITIVITY          Imaging Results              CT Cervical Spine Without Contrast (Final result)  Result time 03/17/23 02:11:15      Final result by Diomedes Stephenson MD (03/17/23 02:11:15)                   Narrative:    EXAM DESCRIPTION: CT CERVICAL SPINE WITHOUT CONTRAST 3/17/2023 2:08 AM CDT    CLINICAL HISTORY: 89 years, Female, Neck trauma (Age >= 65y)    COMPARISON: None    PROCEDURE:  Multiple  axial CT images through the cervical spine were obtained at 2 mm slice thickness at 2 mm interval reconstruction. In addition 2-D multiplanar reformats and the sagittal coronal plane were performed and reviewed.  This exam was performed according to our departmental dose-optimization protocol, which includes automated exposure control, adjustment of the mA and/or kV according to patient size and/or use of iterative reconstruction technique.    FINDINGS:  The alignment of the vertebral bodies are normal.  There is no evidence of fracture or subluxation. Their is anterior spurring with increased sclerosis along the anterior arch of C1/dens. There is degenerative disc disease with decreased intervertebral disc height, anterior spondylosis and posterior osteophyte complex at C3/C4 C4/C5 C5/C6/C7 and C7/T1. There is significant bone bridging at C5/C7 right side. There is minimal anterolisthesis of C4 over C5 from uncovertebral degenerative changes. There is significant spinal canal narrowing at C5/C6 C6/C7 there is uncovertebral degenerative changes C2-T1. There is no prevertebral soft tissue swelling.  Sagittal coronal reformatted images demonstrate no subluxation or bony abnormalities.    IMPRESSION:  No evidence of acute fracture or subluxation of the cervical spine.    Multilevel degenerative disc disease and uncovertebral degenerative changes with significant spinal canal narrowing at C5/C6 and C6/C7.    Minimal anterolisthesis of C4 over C5 from uncovertebral degenerative changes.    Electronically signed by:  Diomedes Stephenson MD  3/17/2023 2:11 AM CDT Workstation: [id number]                                     CT Head Without Contrast (Final result)  Result time 03/17/23 02:02:36      Final result by Diomedes Stephenson MD (03/17/23 02:02:36)                   Narrative:    EXAM DESCRIPTION: CT HEAD WITHOUT CONTRAST 3/17/2023 2:01 AM CDT    CLINICAL HISTORY: 89 years, Female, Facial trauma, blunt    COMPARISON:  None.    FINDINGS:    Multiple transaxial tomograms of the brain were obtained from the base of the skull to the vertex without contrast. 2-D multiplanar reformats and the coronal and sagittal plane were performed and reviewed.  This exam was performed according to our departmental dose-optimization protocol, which includes automated exposure control, adjustment of the mA and/or kV according to patient size and/or use of iterative reconstruction technique.    Brain parenchyma demonstrate mild prominence of the sulci and gyri are corresponding to mild cerebral and cerebellar atrophy. There is minimal periventricular white matter changes of microvascular ischemia. There is a small focal area of hypodensity within the right basal ganglia corresponding to old lacunar infarct There is no midline shift and/or mass effect. There is no evidence for acute intracranial hemorrhage.  Lateral ventricles and cisterns displace normal appearance.  No intra or extra axial fluid collections were seen. The calvarium is intact with no evidence for fracture. The visualized portions of the paranasal sinuses and orbits demonstrate to be clear. There is a anterior subcutaneous right frontal area of hyperdensity corresponding to site of injury/contusion.    IMPRESSION:  No acute intracranial hemorrhage identified.    Mild brain atrophy with minimal periventricular white matter changes of microvascular ischemia.    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    Anterior subcutaneous right frontal area of hyperdensity corresponding to site of injury/contusion.    Electronically signed by:  Diomedes Stephenson MD  3/17/2023 2:02 AM CDT Workstation: [id number]                                     Medications   acetaminophen tablet 1,000 mg (1,000 mg Oral Given 3/17/23 0120)   ondansetron injection 4 mg (4 mg Intravenous Given 3/17/23 0211)   hydrALAZINE injection 5 mg (5 mg Intravenous Given 3/17/23 0245)     Medical Decision Making:    Differential Diagnosis:   Subdural hematoma subarachnoid hemorrhage epidural hematoma concussion hypertensive emergency with static hypotension syncope  Clinical Tests:   Lab Tests: Ordered and Reviewed  Radiological Study: Ordered and Reviewed  Medical Tests: Ordered and Reviewed  ED Management:  Laboratory evaluation reviewed CT scan of the head and cervical spine showed no acute injuries patient's blood pressure was markedly elevated emergency department the nausea vomiting improved with Zofran but hypertension persisted given Tylenol for the headache as well she received hydralazine in the emergency department with improvement in the blood pressure given the severe he elevated blood pressure possible syncope in the erratic blood pressures will admit for further evaluation and treatment will discussed patient's findings with  who will evaluate patient in the ER                        Clinical Impression:   Final diagnoses:  [S09.90XA] Closed head injury, initial encounter (Primary)  [I16.9] Hypertensive crisis        ED Disposition Condition    Admit Stable                Igor Fuentes MD  03/17/23 0336

## 2023-03-17 NOTE — CONSULTS
[facility]  Department of Cardiology  Consult Note      PATIENT NAME: Mere Hoffmann    MRN: [id number]  TODAY'S DATE: 03/17/2023  ADMIT DATE: 3/17/2023                          CONSULT REQUESTED BY: Thaddeus Galarza MD    SUBJECTIVE     PRINCIPAL PROBLEM: Syncope      REASON FOR CONSULT:    From H&P:    Fall       No LOC. + blood thinners         HPI:  Patient presents from home after daughter brought the patient in after unwitnessed fall.  Patient had gotten up out of bed to go the bathroom, apparently fell face 1st on the floor.  Patient did not know how she fell or symptoms leading up to this; was in usual state of health afterwards, no tongue biting, incontinence, confusion.     Patient's daughter reports patient has a ongoing nausea, some evidence dehydration.  Patient's has had ongoing issues with uncontrolled hypertension along with orthostasis at times.  Currently maintained on Coreg.  PMD prescribed p.r.n. losartan and was given 1 dose of losartan 50 mg with resultant hypotension.  Patient's brought blood pressure log, has mostly supine hypertension in the 200s consistently, later in the day the blood pressure comes down.  Currently only taking Coreg 3.125 mg b.i.d..  Because of occasional hypotension has been given midodrine 2.5 mg q.12 hours for orthostasis.     CT head, C-spine without acute issue.  Vomited 1 time in the ER, given Zofran.  Hydralazine given with good effect.      Currently pain-free.  Review systems otherwise negative.      HPI:    Patient is a 89 year old female who presented to the Er with complaints of unwitnessed fall at home. Patient has known orthostatic hypotension and has been treated by other cardiologist and PCP for this. Her daughter lives with her and follows her BP closely. Unfortunately the patient's baseline BP tends to run very elevated so her daughter has not been giving her midodrine due to SBP in the 200s and higher at times. Patient does have nausea,  "poor appetite, and poor hydration over the past week or so. She does drink coffee to "regulate" her bowels but has also had bouts of diarrhea.         Review of patient's allergies indicates:   Allergen Reactions    Penicillins     Sulfa (sulfonamide antibiotics)        Past Medical History:   Diagnosis Date    Hyperlipidemia     Hypertension      Past Surgical History:   Procedure Laterality Date    CARDIAC CATHETERIZATION      CHOLECYSTECTOMY      CORONARY ANGIOPLASTY      moh's surgery  2019    basal cell ca     Social History     Tobacco Use    Smoking status: Former    Smokeless tobacco: Never   Substance Use Topics    Alcohol use: Not Currently    Drug use: Never        REVIEW OF SYSTEMS  CONSTITUTIONAL: Negative for chills, fatigue and fever.   EYES: No double vision, No blurred vision  NEURO: No headaches, + dizziness with position changes   RESPIRATORY: Negative for cough, shortness of breath and wheezing.    CARDIOVASCULAR: Negative for chest pain. Negative for palpitations and leg swelling.   GI: + intermittent incontinence; +diarrhea, nausea and vomiting.   : + incontinence   SKIN: Negative for bruising, Negative for edema or discoloration noted.   PSYCHIATRIC: + short term memory loss  MUSCULOSKELETAL: + fall at home     OBJECTIVE     VITAL SIGNS (Most Recent)  Temp: 97.9 °F (36.6 °C) (03/17/23 1445)  Pulse: 82 (03/17/23 1521)  Resp: 16 (03/17/23 1445)  BP: 121/60 (03/17/23 1521)  SpO2: 98 % (03/17/23 1445)    VENTILATION STATUS  Resp: 16 (03/17/23 1445)  SpO2: 98 % (03/17/23 1445)       I & O (Last 24H):No intake or output data in the 24 hours ending 03/17/23 1624    WEIGHTS  Wt Readings from Last 1 Encounters:   03/17/23 1445 58.4 kg (128 lb 11.2 oz)   03/17/23 0100 58.1 kg (128 lb)   03/17/23 0055 58.5 kg (129 lb)       PHYSICAL EXAM  CONSTITUTIONAL: No fever, no chills  HEENT: Normocephalic, atraumatic,pupils reactive to light; + right eye periorbital bruising.                  NECK:  No JVD no " carotid bruit  CVS: S1S2+, RRR  LUNGS: Clear  ABDOMEN: Soft, NT, BS+  EXTREMITIES: No cyanosis, edema  : No ramsey catheter  NEURO: AAO X 3 but forgetful and easily confused  PSY: Normal affect      HOME MEDICATIONS:  No current facility-administered medications on file prior to encounter.     Current Outpatient Medications on File Prior to Encounter   Medication Sig Dispense Refill    ascorbate calcium (NEFTALI-C ORAL) Take 0.5 tablets by mouth once daily.      atorvastatin (LIPITOR) 40 MG tablet Take 1 tablet (40 mg total) by mouth once daily. 90 tablet 1    carvediloL (COREG) 3.125 MG tablet Take 1 tablet (3.125 mg total) by mouth 2 (two) times daily. 180 tablet 1    cholecalciferol, vitamin D3, (VITAMIN D3) 50 mcg (2,000 unit) Cap Take 1 capsule by mouth once daily.      cyanocobalamin, vitamin B-12, 1,000 mcg TbSR Take 1 tablet by mouth once daily.      diphenoxylate-atropine 2.5-0.025 mg (LOMOTIL) 2.5-0.025 mg per tablet Take 1 tablet by mouth 4 (four) times daily as needed for Diarrhea.      dipyridamole-aspirin 200-25 mg (AGGRENOX)  mg CM12 TK 1 C PO BID (Patient taking differently: Take 1 capsule by mouth 2 (two) times daily. TK 1 C PO BID) 180 capsule 1    famotidine (PEPCID) 20 MG tablet Take 1 tablet (20 mg total) by mouth nightly as needed for Heartburn. 90 tablet 1    FLUoxetine 20 MG capsule Take 1 capsule (20 mg total) by mouth once daily. 90 capsule 1    losartan (COZAAR) 50 MG tablet Take 1 tablet (50 mg total) by mouth once daily. Prn systolic > 170 (Patient taking differently: Take 50 mg by mouth daily as needed. Prn systolic > 170) 30 tablet 3    midodrine (PROAMATINE) 2.5 MG Tab Take 1 tablet (2.5 mg total) by mouth every 12 (twelve) hours. for 15 days 30 tablet 1    multivitamin capsule Take 1 capsule by mouth once daily.      nitroGLYCERIN (NITROSTAT) 0.3 MG SL tablet Place 0.3 mg under the tongue every 5 (five) minutes as needed for Chest pain.      ondansetron (ZOFRAN-ODT) 4 MG TbDL  Take 1 tablet (4 mg total) by mouth every 6 (six) hours as needed (nausea). 40 tablet 2    pantoprazole (PROTONIX) 40 MG tablet Take 1 tablet (40 mg total) by mouth once daily. 90 tablet 1    promethazine (PHENERGAN) 25 MG tablet Take 1 tablet (25 mg total) by mouth every 6 (six) hours as needed for Nausea. 15 tablet 0    UNITHROID 75 mcg tablet Take 1 tablet (75 mcg total) by mouth before breakfast. 90 tablet 1    clobetasoL (TEMOVATE) 0.05 % external solution Apply to scalp 1-2x/day prn itching/scaling (Patient not taking: Reported on 7/14/2022) 50 mL 11    denosumab (PROLIA) 60 mg/mL Syrg Inject 60 mg into the skin every 6 (six) months. Next dose May 2023      RESTASIS 0.05 % ophthalmic emulsion Place 1 drop into both eyes 2 (two) times daily.         SCHEDULED MEDS:   atorvastatin  40 mg Oral Daily    carboxymethylcellulose  2 drop Both Eyes Q4H While awake    cyanocobalamin  1,000 mcg Oral Daily    dipyridamole-aspirin 200-25 mg  1 capsule Oral BID    FLUoxetine  20 mg Oral Daily    heparin (porcine)  5,000 Units Subcutaneous Q12H    levothyroxine  75 mcg Oral Before breakfast    pantoprazole  40 mg Oral BID       CONTINUOUS INFUSIONS:    PRN MEDS:albumin human 25%, carboxymethylcellulose, dextrose 10%, dextrose 10%, glucagon (human recombinant), glucose, glucose, hydrALAZINE, lactated ringers, magnesium oxide, magnesium oxide, magnesium sulfate IVPB, magnesium sulfate IVPB, naloxone, ondansetron, potassium bicarbonate, potassium bicarbonate, potassium bicarbonate, potassium, sodium phosphates, potassium, sodium phosphates, potassium, sodium phosphates, sodium chloride 0.9%    LABS AND DIAGNOSTICS     CBC LAST 3 DAYS  Recent Labs   Lab 03/17/23  0238 03/17/23  0425   WBC 10.82 9.94   RBC 4.53 4.16   HGB 13.2 12.3   HCT 40.1 36.7*   MCV 89 88   MCH 29.1 29.6   MCHC 32.9 33.5   RDW 12.8 12.8    194   MPV 9.7 9.3   GRAN 74.2*  8.0* 83.5*  8.3*   LYMPH 17.7*  1.9 10.5*  1.0   MONO 6.1  0.7 4.9  0.5    BASO 0.05 0.04   NRBC 0 0       COAGULATION LAST 3 DAYS  Recent Labs   Lab 03/17/23  0238   INR 1.0       CHEMISTRY LAST 3 DAYS  Recent Labs   Lab 03/17/23 0238 03/17/23  0426    137   K 3.5 3.2*    104   CO2 22* 24   ANIONGAP 13 9   BUN 15 16   CREATININE 0.8 0.8    116*   CALCIUM 8.7 8.3*   MG  --  1.9   ALBUMIN 3.7  --    PROT 6.9  --    ALKPHOS 79  --    ALT 14  --    AST 21  --    BILITOT 1.1*  --        CARDIAC PROFILE LAST 3 DAYS  Recent Labs   Lab 03/17/23 0238 03/17/23  0426   BNP 63  --    TROPONINIHS 9.2 10.5       ENDOCRINE LAST 3 DAYS  Recent Labs   Lab 03/17/23  0232   TSH 3.240       LAST ARTERIAL BLOOD GAS  ABG  No results for input(s): PH, PO2, PCO2, HCO3, BE in the last 168 hours.    LAST 7 DAYS MICROBIOLOGY   Microbiology Results (last 7 days)       ** No results found for the last 168 hours. **            MOST RECENT IMAGING  Echo  · The left ventricle is normal in size with mild concentric hypertrophy   and normal systolic function.  · The estimated ejection fraction is 65%.  · Normal left ventricular diastolic function.  · Normal right ventricular size with normal right ventricular systolic   function.  · Normal central venous pressure (3 mmHg).  · The estimated PA systolic pressure is 22 mmHg.  · Mild aortic regurgitation.  · Trivial pericardial effusion.     US Doppler Abd/Retroperitoneal Limited  CLINICAL HISTORY:  89 years (3/4/1933) Female labile hypertension    TECHNIQUE:  US RETROPERITONEUM AORTA. A complete ultrasound examination of the retroperitoneum and a color flow and spectral Doppler studies of the renal vasculature were performed.    COMPARISON:  None available.    FINDINGS:  Right kidney: Small in size and slightly increased in echotexture compatible with senescence and/or medical renal disease. There is no evidence of renal cysts, stones large enough to cause acoustic shadowing, contour-deforming mass, or hydronephrosis.  Renal size: 9.2 x 4.9 x 3.9  cm    Left kidney: Small in size and slightly increased in echotexture in keeping with senescence and/or medical renal disease. There is no evidence of renal cysts, stones large enough to cause acoustic shadowing, contour-deforming mass, or hydronephrosis.  Renal size: 9.3 x 5.1 x 4.7 cm    Urinary bladder: Normally distended. No stones, wall thickening, or focal mass.    Additional findings:  The visualized IVC is unremarkable. There are scattered calcified plaques throughout the abdominal aorta with no aneurysm identified in the visualized segments.    Vascular examination:  Abdominal aorta (PSV in the region of the main renal arteries): 223 centimeters per second    Right renal vascular exam: The resistive indices are within normal limits. The peak systolic velocities (PSV) in the main renal artery are within normal range. The right main renal artery to aorta peak systolic velocity ratio is normal. The right renal vein is patent.  Resistive index (upper, mid, lower): 0.59, 0.67, 0.65  Main renal artery PSV (origin, mid, hilar): 88 cm/s, 83 cm/s, and  83 cm/s  RAR/Ao PSV ratio: 0.4  Renal vein: Patent.    Left renal vascular exam: The resistive indices are within normal limits. The peak systolic velocities (PSV) in the main renal artery are within normal range. The left main renal artery to aorta peak systolic velocity ratio is normal. The left renal vein is patent.  Resistive index (upper, mid, lower): 0.68, 0.5, and 0.45  Main renal artery PSV (origin, mid, hilar): 120 cm/s, 142 cm/s, and 1 38 cm/s  RAR/Ao PSV ratio: 0.6  Renal vein: Patent.    IMPRESSION:  1. Likely age-related renal parenchymal change and/or medical renal disease but with no hydronephrosis/shadowing stone, contour deforming renal mass or cyst.    2. No spectral Doppler findings of a hemodynamically significant renal artery stenosis.    3. Atherosclerotic plaques in the abdominal aorta, with relative elevated velocity.    Electronically signed  by:  Mitul Brown MD  3/17/2023 7:26 AM CDT Workstation: [id number]  US Carotid Bilateral  CMS MANDATED QUALITY DATA - CAROTID - 195    All measurements and percent stenosis described below were determined using NASCET criteria or criteria similar to NASCET, as defined by the Society of Radiologists in Ultrasound Consensus Conference, Radiology, 2003    CLINICAL HISTORY:  89 years (3/4/1933) Female syncope    TECHNIQUE  US CAROTID DOPPLER BILATERAL Duplex sonographic ultrasound of the bilateral carotid arteries. Color and grayscale images were obtained.    COMPARISON:  None available.    FINDINGS:  For the RIGHT carotid artery, there is diffuse intimal thickening with a small calcified plaque in the right bulb. The highest peak systolic velocity noted in the right internal carotid artery is 112 cm/s.  The highest peak diastolic velocity noted in the right internal carotid artery is 14 cm/s. The ICA/CCA peak systolic ratio is 1.7. These values suggest less than 50% stenosis. The right vertebral artery shows antegrade flow. The external carotid artery is patent.    For the LEFT carotid artery, there is diffuse intimal thickening with a small partially calcified plaque in the left bulb. The highest peak systolic velocity noted in the left internal carotid artery is 104 cm/s.  The highest peak diastolic velocity noted in the left internal carotid artery is 14 cm/s. The ICA/CCA peak systolic ratio is 1.1. These values suggest no clinically significant degree of stenosis. The left vertebral artery shows retrograde flow. The external carotid artery is patent.    IMPRESSION:  1. Small calcified plaque in the right carotid bulb, with velocities suggesting a less than 50% stenosis.  2. Partially calcified plaque in the left bulb with no clinically significant stenosis.  3. Retrograde flow in the left vertebral artery (suspicious for subclavian steal)  4. Antegrade flow in the right vertebral artery.    Electronically  signed by:  Mitul Brown MD  3/17/2023 7:21 AM CDT Workstation: [id number]  CT Cervical Spine Without Contrast  EXAM DESCRIPTION: CT CERVICAL SPINE WITHOUT CONTRAST 3/17/2023 2:08 AM CDT    CLINICAL HISTORY: 89 years, Female, Neck trauma (Age >= 65y)    COMPARISON: None    PROCEDURE:  Multiple axial CT images through the cervical spine were obtained at 2 mm slice thickness at 2 mm interval reconstruction. In addition 2-D multiplanar reformats and the sagittal coronal plane were performed and reviewed.  This exam was performed according to our departmental dose-optimization protocol, which includes automated exposure control, adjustment of the mA and/or kV according to patient size and/or use of iterative reconstruction technique.    FINDINGS:  The alignment of the vertebral bodies are normal.  There is no evidence of fracture or subluxation. Their is anterior spurring with increased sclerosis along the anterior arch of C1/dens. There is degenerative disc disease with decreased intervertebral disc height, anterior spondylosis and posterior osteophyte complex at C3/C4 C4/C5 C5/C6/C7 and C7/T1. There is significant bone bridging at C5/C7 right side. There is minimal anterolisthesis of C4 over C5 from uncovertebral degenerative changes. There is significant spinal canal narrowing at C5/C6 C6/C7 there is uncovertebral degenerative changes C2-T1. There is no prevertebral soft tissue swelling.  Sagittal coronal reformatted images demonstrate no subluxation or bony abnormalities.    IMPRESSION:  No evidence of acute fracture or subluxation of the cervical spine.    Multilevel degenerative disc disease and uncovertebral degenerative changes with significant spinal canal narrowing at C5/C6 and C6/C7.    Minimal anterolisthesis of C4 over C5 from uncovertebral degenerative changes.    Electronically signed by:  Diomedes Stephenson MD  3/17/2023 2:11 AM CDT Workstation: [id number]  CT Head Without Contrast  EXAM DESCRIPTION: CT  HEAD WITHOUT CONTRAST 3/17/2023 2:01 AM CDT    CLINICAL HISTORY: 89 years, Female, Facial trauma, blunt    COMPARISON: None.    FINDINGS:    Multiple transaxial tomograms of the brain were obtained from the base of the skull to the vertex without contrast. 2-D multiplanar reformats and the coronal and sagittal plane were performed and reviewed.  This exam was performed according to our departmental dose-optimization protocol, which includes automated exposure control, adjustment of the mA and/or kV according to patient size and/or use of iterative reconstruction technique.    Brain parenchyma demonstrate mild prominence of the sulci and gyri are corresponding to mild cerebral and cerebellar atrophy. There is minimal periventricular white matter changes of microvascular ischemia. There is a small focal area of hypodensity within the right basal ganglia corresponding to old lacunar infarct There is no midline shift and/or mass effect. There is no evidence for acute intracranial hemorrhage.  Lateral ventricles and cisterns displace normal appearance.  No intra or extra axial fluid collections were seen. The calvarium is intact with no evidence for fracture. The visualized portions of the paranasal sinuses and orbits demonstrate to be clear. There is a anterior subcutaneous right frontal area of hyperdensity corresponding to site of injury/contusion.    IMPRESSION:  No acute intracranial hemorrhage identified.    Mild brain atrophy with minimal periventricular white matter changes of microvascular ischemia.    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    Anterior subcutaneous right frontal area of hyperdensity corresponding to site of injury/contusion.    Electronically signed by:  Diomedes Stephenson MD  3/17/2023 2:02 AM CDT Workstation: [id number]      ECHOCARDIOGRAM RESULTS (last 5)  Results for orders placed during the hospital encounter of 03/17/23    Echo    Interpretation Summary  · The left ventricle is  normal in size with mild concentric hypertrophy and normal systolic function.  · The estimated ejection fraction is 65%.  · Normal left ventricular diastolic function.  · Normal right ventricular size with normal right ventricular systolic function.  · Normal central venous pressure (3 mmHg).  · The estimated PA systolic pressure is 22 mmHg.  · Mild aortic regurgitation.  · Trivial pericardial effusion.      Results for orders placed during the hospital encounter of 12/02/21    Echo    Interpretation Summary  · Normal systolic function.  · The estimated ejection fraction is 65%.  · Grade I left ventricular diastolic dysfunction.  · Normal right ventricular size with normal right ventricular systolic function.  · Mild aortic regurgitation.      CURRENT/PREVIOUS VISIT EKG  Results for orders placed or performed during the hospital encounter of 03/17/23   EKG 12-lead    Collection Time: 03/17/23  2:38 AM    Narrative    Test Reason : R07.9,    Vent. Rate : 061 BPM     Atrial Rate : 061 BPM     P-R Int : 126 ms          QRS Dur : 090 ms      QT Int : 486 ms       P-R-T Axes : 041 -17 046 degrees     QTc Int : 489 ms    Normal sinus rhythm  Nonspecific ST and T wave abnormality  Abnormal ECG  When compared with ECG of 27-JUN-2022 14:54,  Previous ECG has undetermined rhythm, needs review  Nonspecific T wave abnormality now evident in Inferior leads  Nonspecific T wave abnormality now evident in Lateral leads  QT has lengthened    Referred By: [email or web address]   SELF           Confirmed By:            ASSESSMENT/PLAN:     Active Hospital Problems    Diagnosis    *Syncope    Hypertensive urgency    Stented coronary artery    Vascular dementia without behavioral disturbance    Hypothyroidism, unspecified       ASSESSMENT & PLAN:     Abnormal neurocardiogenic axis  Profound orthostatic hypotension   Fall at home  HTN urgency  Dementia  Depression       RECOMMENDATIONS:    Discussed with patient and her daughter that she will  require a medication to reduce the drop in BP when standing but also to lower resting BP.   Add florinef 100 mg po daily.   Stop coreg.   Add amlodipine 2.5 mg po QHS.   Resume losartan at 25 mg  po daily.   Add thigh high kaiden hose.   Consult dietary due to poor appetite.   Consider adjustment of antidepressant due to clear reports of depression and lack of interest in activities.   She needs physical therapy. We are aware of the risks of labile BP which is all the more reasoning that strengthening will help her.   Will follow.         Alma Dunne NP  Department of Cardiology   [facility]  Date of Service: 03/17/2023  4:24 PM     I have personally interviewed and examined the patient, I have reviewed the Nurse Practitioner's history and physical, assessment, and plan. I have personally evaluated the patient at bedside and agree with the findings and made appropriate changes as necessary in recommendations.    Patient seen and evaluated. Medications reviewed.  Cozaar 50 mg drops BP TO 80S.  MIDODRINE SPIKES it up to 220..  NOT TAKING PO,LOW K SECONDARY TO DIARRHEA    Trial florinef0.1 bid. , cozaar 25 hs, amlodipine in am  Compression stockings   ADJUST MEDS UP AS NEEDED.    Arturo Owens MD  Department of Cardiology  [facility]  03/17/2023 4:37 PM

## 2023-03-17 NOTE — PT/OT/SLP EVAL
Occupational Therapy   Evaluation    Name: Mere Hoffmann  MRN: [id number]  Admitting Diagnosis: Syncope  Recent Surgery: * No surgery found *      Recommendations:     Discharge Recommendations: home  Discharge Equipment Recommendations:  none  Barriers to discharge:  None    Assessment:     Mree Hoffmann is a 89 y.o. female with a medical diagnosis of Syncope.  She presents with general weakness s/p fall at home. Performance deficits affecting function: weakness, impaired endurance, impaired self care skills, impaired functional mobility, gait instability, impaired balance, decreased safety awareness.      Rehab Prognosis: Fair; patient would benefit from acute skilled OT services to address these deficits and reach maximum level of function.       Plan:     Patient to be seen 3 x/week to address the above listed problems via self-care/home management, therapeutic activities, therapeutic exercises  Plan of Care Expires: 04/17/23  Plan of Care Reviewed with: patient, daughter    Subjective     Chief Complaint: General weakness  Patient/Family Comments/goals: improved functional mobility and ADL independence.    Occupational Profile:  Living Environment: lives with daughter in a 1 story home, 1 step to enter.  Previous level of function: supervision for ADLs using AD. Daughter present with patient during bathing, dressing and in/out of shower. Daughter takes care of home management and transportation. Patient has STM deficits at baseline.  Roles and Routines: limited homemaker  Equipment Used at Home: bath bench, grab bar, rollator, wheelchair  Assistance upon Discharge: Daughter (Retired [employer] nurse)    Pain/Comfort:  Pain Rating 1: 0/10  Pain Rating Post-Intervention 1: 0/10    Patients cultural, spiritual, Protestant conflicts given the current situation: no    Objective:     Communicated with: nurse prior to session.  Patient found HOB elevated with telemetry, peripheral IV upon OT entry to room.    General  Precautions: Standard, fall  Orthopedic Precautions: N/A  Braces: N/A  Respiratory Status: Room air    Occupational Performance:    Bed Mobility:    Patient completed Scooting/Bridging with contact guard assistance  Patient completed Supine to Sit with contact guard assistance  Performed unsupported sitting EOB with contact guard assistance.    Activities of Daily Living:  Lower Body Dressing: contact guard assistance to don/doff socks sitting EOB.    Cognitive/Visual Perceptual:  Cognitive/Psychosocial Skills:     -       Oriented to: Person and Place   -       Follows Commands/attention:Follows one-step commands  -       Communication: clear/fluent  -       Memory: Impaired STM  -       Safety awareness/insight to disability: impaired   -       Mood/Affect/Coping skills/emotional control: Cooperative and Pleasant  Visual/Perceptual:      -Intact Acuity    Physical Exam:  Balance:    -       Sitting: Contact Guard  Upper Extremity Range of Motion:     -       Right Upper Extremity: WFL  -       Left Upper Extremity: WFL  Upper Extremity Strength:    -       Right Upper Extremity: 3+/5  -       Left Upper Extremity: 3+/5   Strength:    -       Right Upper Extremity: fair  -       Left Upper Extremity: fair  Fine Motor Coordination:    -       Intact    AMPAC 6 Click ADL:  AMPAC Total Score: 19    Treatment & Education:  Patient and daughter educated on the purpose of Occupational Therapy and the importance of getting OOB.    Patient left HOB elevated with all lines intact, call button in reach, and daughter present    GOALS:   Multidisciplinary Problems       Occupational Therapy Goals          Problem: Occupational Therapy    Goal Priority Disciplines Outcome Interventions   Occupational Therapy Goal     OT, PT/OT     Description: Goals to be met by: 4/17/2023     Patient will increase functional independence with ADLs by performing:    UE Dressing with Supervision.  LE Dressing with Supervision.  Grooming  while standing at sink with Supervision.  Toileting from toilet with Supervision for hygiene and clothing management.   Toilet transfer to toilet with Supervision.                         History:     Past Medical History:   Diagnosis Date    Hyperlipidemia     Hypertension          Past Surgical History:   Procedure Laterality Date    CARDIAC CATHETERIZATION      CHOLECYSTECTOMY      CORONARY ANGIOPLASTY      [facility] [place]'s surgery  2019    basal cell ca       Time Tracking:     OT Date of Treatment: 03/17/23  OT Start Time: 0859  OT Stop Time: 0922  OT Total Time (min): 23 min    Billable Minutes:Evaluation 8  Self Care/Home Management 15    3/17/2023

## 2023-03-17 NOTE — CARE UPDATE
Patient with recurrent orthostatic hypotension.  Would place [facility] and patient, cardiology consult pending  H&P per previous physician, labs and images reviewed.  Continue current care management.

## 2023-03-17 NOTE — PT/OT/SLP EVAL
"Physical Therapy Evaluation and Discharge Note    Patient Name:  Mere Hoffmann   MRN:  [id number]    Recommendations:     Discharge Recommendations: home  Discharge Equipment Recommendations: none   Barriers to discharge:  Orthostatic Hypotension/High Fall Risk    Assessment:     Mere Hoffmann is a 89 y.o. female admitted with a medical diagnosis of Syncope. .  At this time, patient requires very close Supervision/Assist with mobility due to high fall risk from Orthostatic Hypotension related to labile BP. PT determined that therapy services will not benefit pt at this times as her issues are purely medical in nature and not related to mobility impairments.  Recent Surgery: * No surgery found *      Plan:     During this hospitalization, patient does not require further acute PT services.  Please re-consult if situation changes.      Subjective     Chief Complaint: "Dizziness" & "Woozy" sitting and standing  Patient/Family Comments/goals: home with daughter (retired RN) to supervise/assist  Pain/Comfort:  Pain Rating 1: 0/10  Pain Rating Post-Intervention 1: 0/10    Patients cultural, spiritual, Taoism conflicts given the current situation:      Living Environment:  Pt lives with her daughter in a [facility] with ANDRE.  Prior to admission, patients level of function was Supervision to Modified Independent with rollator in house in w/c level in community.  Equipment used at home: bath bench, grab bar, rollator, wheelchair.  DME owned (not currently used): none.  Upon discharge, patient will have assistance from her daughter.    Objective:     Communicated with RN & OT prior to session.  Patient found  sitting up EOB(stretcher in ED) at end of OT session  with blood pressure cuff, PureWick, peripheral IV, telemetry, pulse ox (continuous) upon PT entry to room.    General Precautions: Standard, fall    Orthopedic Precautions:N/A   Braces: N/A  Respiratory Status: Room air    Exams:  Cognitive Exam:  Patient is oriented " to Person, Place, and Situation  RLE ROM: WFL  RLE Strength: grossly 4/5  LLE ROM: WFL  LLE Strength: grossly 4/5    Functional Mobility:  Bed Mobility:   HOB 45 de/58  Scooting: contact guard assistance  Supine to Sit: contact guard assistance and minimum assistance; /59 with mild dizziness  Sit to Supine: contact guard assistance and minimum assistance  Transfers:     Sit to Stand:  contact guard assistance with hand-held assist and rolling walker; 96/52 with increasing dizziness    AM-PAC 6 CLICK MOBILITY  Total Score:20       Treatment and Education:  Pt was educated on the following: call light use, importance of OOB activity and functional mobility to negate the negative effects of prolonged bed rest during this hospitalization, safe transfers/ambulation and discharge planning recommendations/options.      AM-PAC 6 CLICK MOBILITY  Total Score:20     Patient left HOB elevated with all lines intact, call button in reach, RN present, and 167/72 .    GOALS:   Multidisciplinary Problems       Physical Therapy Goals       Not on file                    History:     Past Medical History:   Diagnosis Date    Hyperlipidemia     Hypertension        Past Surgical History:   Procedure Laterality Date    CARDIAC CATHETERIZATION      CHOLECYSTECTOMY      CORONARY ANGIOPLASTY      [facility] [place] [facility]'s surgery  2019    basal cell ca       Time Tracking:     PT Received On: 23  PT Start Time: 921     PT Stop Time: 937  PT Total Time (min): 16 min     Billable Minutes: Evaluation 8 and Therapeutic Activity 8      2023

## 2023-03-17 NOTE — H&P
Formerly [facility] - Emergency Dept    History & Physical      Patient Name: Mere Hoffmann  MRN: [id number]  Admission Date: 3/17/2023  Attending Physician: Thaddeus Galarza MD   Primary Care Provider: Vidal Kraus MD         Patient information was obtained from patient and ER records.     Subjective:     Principal Problem:Syncope    Chief Complaint:   Chief Complaint   Patient presents with    Fall     No LOC. + blood thinners        HPI:  Patient presents from home after daughter brought the patient in after unwitnessed fall.  Patient had gotten up out of bed to go the bathroom, apparently fell face 1st on the floor.  Patient did not know how she fell or symptoms leading up to this; was in usual state of health afterwards, no tongue biting, incontinence, confusion.    Patient's daughter reports patient has a ongoing nausea, some evidence dehydration.  Patient's has had ongoing issues with uncontrolled hypertension along with orthostasis at times.  Currently maintained on Coreg.  PMD prescribed p.r.n. losartan and was given 1 dose of losartan 50 mg with resultant hypotension.  Patient's brought blood pressure log, has mostly supine hypertension in the 200s consistently, later in the day the blood pressure comes down.  Currently only taking Coreg 3.125 mg b.i.d..  Because of occasional hypotension has been given midodrine 2.5 mg q.12 hours for orthostasis.    CT head, C-spine without acute issue.  Vomited 1 time in the ER, given Zofran.  Hydralazine given with good effect.     Currently pain-free.  Review systems otherwise negative.    Past Medical History:   Diagnosis Date    Hyperlipidemia     Hypertension        Past Surgical History:   Procedure Laterality Date    CARDIAC CATHETERIZATION      CHOLECYSTECTOMY      CORONARY ANGIOPLASTY      [facility]  2019    basal cell ca       Review of patient's allergies indicates:   Allergen Reactions    Penicillins     Sulfa (sulfonamide antibiotics)         No current facility-administered medications on file prior to encounter.     Current Outpatient Medications on File Prior to Encounter   Medication Sig    ascorbate calcium (NEFTALI-C ORAL) Take 0.5 tablets by mouth once daily.    atorvastatin (LIPITOR) 40 MG tablet Take 1 tablet (40 mg total) by mouth once daily.    carvediloL (COREG) 3.125 MG tablet Take 1 tablet (3.125 mg total) by mouth 2 (two) times daily.    cholecalciferol, vitamin D3, (VITAMIN D3) 50 mcg (2,000 unit) Cap Take 1 capsule by mouth once daily.    cyanocobalamin, vitamin B-12, 1,000 mcg TbSR Take 1 tablet by mouth once daily.    diphenoxylate-atropine 2.5-0.025 mg (LOMOTIL) 2.5-0.025 mg per tablet Take 1 tablet by mouth 4 (four) times daily as needed for Diarrhea.    dipyridamole-aspirin 200-25 mg (AGGRENOX)  mg CM12 TK 1 C PO BID (Patient taking differently: Take 1 capsule by mouth 2 (two) times daily. TK 1 C PO BID)    famotidine (PEPCID) 20 MG tablet Take 1 tablet (20 mg total) by mouth nightly as needed for Heartburn.    FLUoxetine 20 MG capsule Take 1 capsule (20 mg total) by mouth once daily.    losartan (COZAAR) 50 MG tablet Take 1 tablet (50 mg total) by mouth once daily. Prn systolic > 170 (Patient taking differently: Take 50 mg by mouth daily as needed. Prn systolic > 170)    midodrine (PROAMATINE) 2.5 MG Tab Take 1 tablet (2.5 mg total) by mouth every 12 (twelve) hours. for 15 days    multivitamin capsule Take 1 capsule by mouth once daily.    nitroGLYCERIN (NITROSTAT) 0.3 MG SL tablet Place 0.3 mg under the tongue every 5 (five) minutes as needed for Chest pain.    ondansetron (ZOFRAN-ODT) 4 MG TbDL Take 1 tablet (4 mg total) by mouth every 6 (six) hours as needed (nausea).    pantoprazole (PROTONIX) 40 MG tablet Take 1 tablet (40 mg total) by mouth once daily.    promethazine (PHENERGAN) 25 MG tablet Take 1 tablet (25 mg total) by mouth every 6 (six) hours as needed for Nausea.    UNITHROID 75 mcg tablet Take 1 tablet (75  mcg total) by mouth before breakfast.    clobetasoL (TEMOVATE) 0.05 % external solution Apply to scalp 1-2x/day prn itching/scaling (Patient not taking: Reported on 7/14/2022)    denosumab (PROLIA) 60 mg/mL Syrg Inject 60 mg into the skin every 6 (six) months. Next dose May 2023    RESTASIS 0.05 % ophthalmic emulsion Place 1 drop into both eyes 2 (two) times daily.     Family History       Problem Relation (Age of Onset)    Heart disease Mother          Tobacco Use    Smoking status: Former    Smokeless tobacco: Never   Substance and Sexual Activity    Alcohol use: Not Currently    Drug use: Never    Sexual activity: Not on file     Review of Systems  Objective:     Vital Signs (Most Recent):  Temp: 98 °F (36.7 °C) (03/17/23 0100)  Pulse: 70 (03/17/23 0340)  Resp: 14 (03/17/23 0100)  BP: (!) 166/74 (03/17/23 0340)  SpO2: 95 % (03/17/23 0340)   Vital Signs (24h Range):  Temp:  [98 °F (36.7 °C)] 98 °F (36.7 °C)  Pulse:  [63-81] 70  Resp:  [14-20] 14  SpO2:  [95 %-99 %] 95 %  BP: (166-230)/(74-98) 166/74     Weight: 58.1 kg (128 lb)  Body mass index is 23.41 kg/m².    Physical Exam   Physical Exam     Constitutional: She appears well-developed and well-nourished. No distress.   HENT:   Head: Normocephalic and facial erythema and tenderness.  Right Ear: External ear normal.   Left Ear: External ear normal.   Mouth/Throat: Oropharynx is clear and moist.   TMs clear bilaterally   Eyes: EOM are normal. Pupils are equal, round, and reactive to light.   Neck: Neck supple.   No midline spinal tenderness or step-off   Normal range of motion.  Cardiovascular:  Normal rate, regular rhythm, normal heart sounds and intact distal pulses.           Pulmonary/Chest: Breath sounds normal. No respiratory distress.   Abdominal: Abdomen is soft. Bowel sounds are normal. There is no abdominal tenderness.   Musculoskeletal:         General: Normal range of motion.      Cervical back: Normal range of motion and neck supple.       Neurological: She is alert. She has normal strength. No cranial nerve deficit. GCS score is 15. GCS eye subscore is 4. GCS verbal subscore is 5. GCS motor subscore is 6.   Skin: Skin is warm and dry. Capillary refill takes less than 2 seconds.   Psychiatric: She has a normal mood and affect. Her behavior is normal.        Significant Labs: All pertinent labs within the past 24 hours have been reviewed.    Significant Imaging: I have reviewed all pertinent imaging results/findings within the past 24 hours.    Assessment/Plan:     Active Diagnoses:    Diagnosis Date Noted POA    PRINCIPAL PROBLEM:  Syncope [R55] 03/17/2023 Unknown    Hypertensive urgency [I16.0] 03/17/2023 Unknown    Stented coronary artery [Z95.5] 05/10/2022 Not Applicable    Vascular dementia without behavioral disturbance [F01.50] 02/08/2022 Yes    Hypothyroidism, unspecified [E03.9] 06/17/2015 Yes      Problems Resolved During this Admission:     VTE Risk Mitigation (From admission, onward)           Ordered     IP VTE HIGH RISK PATIENT  Once         03/17/23 0339     Place sequential compression device  Until discontinued         03/17/23 0339                  #Labile blood pressure with predominant malignant hypertension and periods of orthostasis at times.  #Possible Syncope (cannot recall events prior to fall)  #Head trauma  #decreased appetite    History of Orthostatic hypotension   History of Essential hypertension   History of Vascular dementia without behavioral disturbance   History of Stroke of right basal ganglia   History of Anxiety disorder, unspecified type   History of Atherosclerotic heart disease of native coronary artery with other forms of angina pectoris   History of  Stented coronary artery   History of Menopausal state   History of Acquired hypothyroidism   History of Age-related osteoporosis without current pathological fracture     -Admit to telemetry  -Continue with PTA Coreg 3.125 mg b.i.d., p.r.n. labetalol.  Would not  lower blood pressure more than 25% (avoid lowering below 165 in 24 hours), add prn LR bolus if sbp<140. Coreg to be held if sbp<160  -may have more labile blood pressure than normal because of poor appetite, lack of eating, lack of hydration  -consider Imdur 30 mg for systolic HTN  -Fall precautions  -PT/OT evaluation  -Check TSH, plasma metanephrines, renal arterial Dopplers  -Check B12, TSH  -Cardiology consult, nutrition csx  -Check Carotid ultrasound, echo, orthostatics for possible syncope; consider getting CTA head and neck to assess for for vertebrobasilar insufficiency  -Nausea vomiting possibly related to labile blood pressure, increase Protonix to b.i.d. and consult GI as outpatient if persistent once blood pressure more regulated  -Heparin subQ    Thaddeus Galarza MD  Department of Hospital Medicine   [facility] - Emergency Dept

## 2023-03-17 NOTE — PLAN OF CARE
[facility] - Emergency Dept  Initial Discharge Assessment       Primary Care Provider: Vidal Kraus MD    Admission Diagnosis: Closed head injury, initial encounter [S09.90XA]    Admission Date: 3/17/2023  Expected Discharge Date:      met with Pt at bedside to complete discharge assessment. Pt's daughter (MARNI CASTRO (Daughter) [phone] (Mobile) also present at time and volunteered to participate. Pt AAOx4s. Demographics, PCP, and insurance verified. No home health. No dialysis. Pt reports ability to complete ADLs with the assistance of a rollator, a wheelchair, a shower chair, a bath bench, and grab bars in the bath room. Pt requires assistance from daughter for bathing and dressing. Pt verbalized plan to discharge home via family transport. Pt has no other needs to be addressed at this time.    Discharge Barriers Identified: None    Payor: MEDICARE / Plan: MEDICARE PART A & B / Product Type: Government /     Extended Emergency Contact Information  Primary Emergency Contact: MARNI CASTRO  Mobile Phone: [phone]  Relation: Daughter   needed? No    Discharge Plan A: Home with family  Discharge Plan B: Home with family      [facility] - [address]  [address] [facility]  [address] [place] [address]  Phone: [phone] Fax: [phone]    [id number] - [place] - [address] [facility]  [address]  [place] [address]  Phone: [phone] Fax: [phone]      Initial Assessment (most recent)       Adult Discharge Assessment - 03/17/23 0912          Discharge Assessment    Assessment Type Discharge Planning Assessment     Confirmed/corrected address, phone number and insurance Yes     Confirmed Demographics Correct on Facesheet     Source of Information patient;family     Does patient/caregiver understand observation status Yes     Communicated  KAIDEN with patient/caregiver Yes     Reason For Admission Syncope     People in Home child(donn), adult;other relative(s)     Facility Arrived From: Home     Do you expect to return to your current living situation? Yes     Do you have help at home or someone to help you manage your care at home? Yes     Who are your caregiver(s) and their phone number(s)? MARNI CASTRO (Daughter)   [phone] (Mobile)     Prior to hospitilization cognitive status: Alert/Oriented     Current cognitive status: Alert/Oriented     Walking or Climbing Stairs ambulation difficulty, requires equipment;ambulation difficulty, assistance 1 person;stair climbing difficulty, requires equipment;transferring difficulty, requires equipment;stair climbing difficulty, assistance 1 person;transferring difficulty, assistance 1 person     Mobility Management Rollator, wheelchair     Dressing/Bathing bathing difficulty, assistance 1 person;dressing difficulty, assistance 1 person;bathing difficulty, requires equipment;dressing difficulty, requires equipment     Dressing/Bathing Management Bath bench, shower chair, assistance of daughter, grab bar     Home Accessibility wheelchair accessible     Home Layout Able to live on 1st floor     Equipment Currently Used at Home bath bench;grab bar;rollator;wheelchair     Readmission within 30 days? No     Patient currently being followed by outpatient case management? No     Do you currently have service(s) that help you manage your care at home? No     Do you take prescription medications? Yes     Do you have prescription coverage? Yes     Coverage Payor:  MEDICARE - MEDICARE PART A & B     Do you have any problems affording any of your prescribed medications? No     Is the patient taking medications as prescribed? yes     Who is going to help you get home at discharge? MARNI CASTRO (Daughter)   [phone] (Mobile)     How do you get to doctors appointments? family or friend will provide     Are you on  dialysis? No     Do you take coumadin? No     Discharge Plan A Home with family     Discharge Plan B Home with family     DME Needed Upon Discharge  none     Discharge Plan discussed with: Patient;Adult children     Discharge Barriers Identified None        OTHER    Name(s) of People in Home MARNI CASTRO (Daughter)   [phone] (Mobile)

## 2023-03-17 NOTE — PLAN OF CARE
met with Pt at bedside to complete WOLF. Pt's daughter (MARNI CASTRO (Daughter) [phone] (Mobile) also present at time and volunteered to participate. Pt was explained WOLF. Pt verbalized understanding of WOLF and Pt's daughter signed. WOLF scanned to .       03/17/23 0909   WOLF Message   Medicare Outpatient and Observation Notification regarding financial responsibility Given to patient/caregiver;Explained to patient/caregiver;Signed/date by patient/caregiver   Date WOLF was signed 03/17/23   Time WOLF was signed 0909

## 2023-03-17 NOTE — PLAN OF CARE
Problem: Skin Injury Risk Increased  Goal: Skin Health and Integrity  Intervention: Promote and Optimize Oral Intake  Flowsheets (Taken 3/17/2023 1736)  Oral Nutrition Promotion: calorie-dense liquids provided     Problem: Oral Intake Inadequate  Goal: Improved Oral Intake  Outcome: Ongoing, Progressing  Intervention: Promote and Optimize Oral Intake  Flowsheets (Taken 3/17/2023 1736)  Oral Nutrition Promotion: calorie-dense liquids provided

## 2023-03-17 NOTE — PLAN OF CARE
CM requested follow up apt with PCP from [facility] (Ashleigh Marshall, Emma Gilman, Ilda Chaney, Marysol Santo, Luz Carrillo, and Melissa Bravo). Someone from [facility] will contact pt for scheduling.

## 2023-03-18 LAB
ANION GAP SERPL CALC-SCNC: 5 MMOL/L (ref 8–16)
BASOPHILS # BLD AUTO: 0.03 K/UL (ref 0–0.2)
BASOPHILS NFR BLD: 0.4 % (ref 0–1.9)
BUN SERPL-MCNC: 11 MG/DL (ref 8–23)
CALCIUM SERPL-MCNC: 8.2 MG/DL (ref 8.7–10.5)
CHLORIDE SERPL-SCNC: 107 MMOL/L (ref 95–110)
CO2 SERPL-SCNC: 26 MMOL/L (ref 23–29)
CREAT SERPL-MCNC: 0.7 MG/DL (ref 0.5–1.4)
DIFFERENTIAL METHOD: ABNORMAL
EOSINOPHIL # BLD AUTO: 0.1 K/UL (ref 0–0.5)
EOSINOPHIL NFR BLD: 1 % (ref 0–8)
ERYTHROCYTE [DISTWIDTH] IN BLOOD BY AUTOMATED COUNT: 13.1 % (ref 11.5–14.5)
EST. GFR  (NO RACE VARIABLE): >60 ML/MIN/1.73 M^2
GLUCOSE SERPL-MCNC: 122 MG/DL (ref 70–110)
HCT VFR BLD AUTO: 37.8 % (ref 37–48.5)
HGB BLD-MCNC: 12.5 G/DL (ref 12–16)
IMM GRANULOCYTES # BLD AUTO: 0.04 K/UL (ref 0–0.04)
IMM GRANULOCYTES NFR BLD AUTO: 0.6 % (ref 0–0.5)
LYMPHOCYTES # BLD AUTO: 1.6 K/UL (ref 1–4.8)
LYMPHOCYTES NFR BLD: 22.7 % (ref 18–48)
MCH RBC QN AUTO: 29.3 PG (ref 27–31)
MCHC RBC AUTO-ENTMCNC: 33.1 G/DL (ref 32–36)
MCV RBC AUTO: 89 FL (ref 82–98)
MONOCYTES # BLD AUTO: 0.7 K/UL (ref 0.3–1)
MONOCYTES NFR BLD: 9.7 % (ref 4–15)
NEUTROPHILS # BLD AUTO: 4.6 K/UL (ref 1.8–7.7)
NEUTROPHILS NFR BLD: 65.6 % (ref 38–73)
NRBC BLD-RTO: 0 /100 WBC
PLATELET # BLD AUTO: 205 K/UL (ref 150–450)
PMV BLD AUTO: 9.7 FL (ref 9.2–12.9)
POTASSIUM SERPL-SCNC: 3.6 MMOL/L (ref 3.5–5.1)
RBC # BLD AUTO: 4.27 M/UL (ref 4–5.4)
SODIUM SERPL-SCNC: 138 MMOL/L (ref 136–145)
WBC # BLD AUTO: 6.99 K/UL (ref 3.9–12.7)

## 2023-03-18 PROCEDURE — 36415 COLL VENOUS BLD VENIPUNCTURE: CPT | Performed by: INTERNAL MEDICINE

## 2023-03-18 PROCEDURE — 25000003 PHARM REV CODE 250: Performed by: NURSE PRACTITIONER

## 2023-03-18 PROCEDURE — 85025 COMPLETE CBC W/AUTO DIFF WBC: CPT | Performed by: INTERNAL MEDICINE

## 2023-03-18 PROCEDURE — 99233 PR SUBSEQUENT HOSPITAL CARE,LEVL III: ICD-10-PCS | Mod: ,,, | Performed by: INTERNAL MEDICINE

## 2023-03-18 PROCEDURE — 99233 SBSQ HOSP IP/OBS HIGH 50: CPT | Mod: ,,, | Performed by: INTERNAL MEDICINE

## 2023-03-18 PROCEDURE — 25000003 PHARM REV CODE 250: Performed by: GENERAL PRACTICE

## 2023-03-18 PROCEDURE — 96376 TX/PRO/DX INJ SAME DRUG ADON: CPT

## 2023-03-18 PROCEDURE — 63600175 PHARM REV CODE 636 W HCPCS: Performed by: STUDENT IN AN ORGANIZED HEALTH CARE EDUCATION/TRAINING PROGRAM

## 2023-03-18 PROCEDURE — S0179 MEGESTROL 20 MG: HCPCS | Performed by: INTERNAL MEDICINE

## 2023-03-18 PROCEDURE — 25000003 PHARM REV CODE 250: Performed by: INTERNAL MEDICINE

## 2023-03-18 PROCEDURE — 80048 BASIC METABOLIC PNL TOTAL CA: CPT | Performed by: INTERNAL MEDICINE

## 2023-03-18 PROCEDURE — 96366 THER/PROPH/DIAG IV INF ADDON: CPT

## 2023-03-18 PROCEDURE — 96367 TX/PROPH/DG ADDL SEQ IV INF: CPT

## 2023-03-18 PROCEDURE — 63600175 PHARM REV CODE 636 W HCPCS: Mod: JZ,JG | Performed by: NURSE PRACTITIONER

## 2023-03-18 PROCEDURE — P9045 ALBUMIN (HUMAN), 5%, 250 ML: HCPCS | Mod: JZ,JG | Performed by: NURSE PRACTITIONER

## 2023-03-18 PROCEDURE — G0378 HOSPITAL OBSERVATION PER HR: HCPCS

## 2023-03-18 RX ORDER — ALBUMIN HUMAN 50 G/1000ML
25 SOLUTION INTRAVENOUS ONCE
Status: COMPLETED | OUTPATIENT
Start: 2023-03-18 | End: 2023-03-18

## 2023-03-18 RX ORDER — MEGESTROL ACETATE 40 MG/ML
200 SUSPENSION ORAL DAILY
Status: DISCONTINUED | OUTPATIENT
Start: 2023-03-18 | End: 2023-03-19 | Stop reason: HOSPADM

## 2023-03-18 RX ORDER — METOPROLOL TARTRATE 25 MG/1
12.5 TABLET ORAL 2 TIMES DAILY
Status: DISCONTINUED | OUTPATIENT
Start: 2023-03-18 | End: 2023-03-19 | Stop reason: HOSPADM

## 2023-03-18 RX ADMIN — CARBOXYMETHYLCELLULOSE SODIUM 2 DROP: 0.5 SOLUTION, GEL FORMING / DROPS OPHTHALMIC at 05:03

## 2023-03-18 RX ADMIN — FLUOXETINE 20 MG: 20 CAPSULE ORAL at 09:03

## 2023-03-18 RX ADMIN — FLUDROCORTISONE ACETATE 100 MCG: 0.1 TABLET ORAL at 09:03

## 2023-03-18 RX ADMIN — ALBUMIN (HUMAN) 25 G: 12.5 SOLUTION INTRAVENOUS at 04:03

## 2023-03-18 RX ADMIN — MEGESTROL ACETATE 200 MG: 400 SUSPENSION ORAL at 12:03

## 2023-03-18 RX ADMIN — HYDRALAZINE HYDROCHLORIDE 5 MG: 20 INJECTION INTRAMUSCULAR; INTRAVENOUS at 07:03

## 2023-03-18 RX ADMIN — ATORVASTATIN CALCIUM 40 MG: 40 TABLET, FILM COATED ORAL at 09:03

## 2023-03-18 RX ADMIN — ASPIRIN AND EXTENDED-RELEASE DIPYRIDAMOLE 1 CAPSULE: 25; 200 CAPSULE ORAL at 09:03

## 2023-03-18 RX ADMIN — METOPROLOL TARTRATE 12.5 MG: 25 TABLET, FILM COATED ORAL at 09:03

## 2023-03-18 RX ADMIN — POTASSIUM BICARBONATE 50 MEQ: 977.5 TABLET, EFFERVESCENT ORAL at 09:03

## 2023-03-18 RX ADMIN — PANTOPRAZOLE SODIUM 40 MG: 40 TABLET, DELAYED RELEASE ORAL at 05:03

## 2023-03-18 RX ADMIN — CARBOXYMETHYLCELLULOSE SODIUM 2 DROP: 0.5 SOLUTION, GEL FORMING / DROPS OPHTHALMIC at 12:03

## 2023-03-18 RX ADMIN — CARBOXYMETHYLCELLULOSE SODIUM 2 DROP: 0.5 SOLUTION, GEL FORMING / DROPS OPHTHALMIC at 03:03

## 2023-03-18 RX ADMIN — ASPIRIN AND EXTENDED-RELEASE DIPYRIDAMOLE 1 CAPSULE: 25; 200 CAPSULE ORAL at 10:03

## 2023-03-18 RX ADMIN — CYANOCOBALAMIN TAB 1000 MCG 1000 MCG: 1000 TAB at 09:03

## 2023-03-18 RX ADMIN — CARBOXYMETHYLCELLULOSE SODIUM 2 DROP: 0.5 SOLUTION, GEL FORMING / DROPS OPHTHALMIC at 09:03

## 2023-03-18 RX ADMIN — LEVOTHYROXINE SODIUM 75 MCG: 0.03 TABLET ORAL at 05:03

## 2023-03-18 NOTE — PLAN OF CARE
Problem: Adult Inpatient Plan of Care  Goal: Plan of Care Review  Outcome: Ongoing, Progressing  Goal: Patient-Specific Goal (Individualized)  Outcome: Ongoing, Progressing  Goal: Absence of Hospital-Acquired Illness or Injury  Outcome: Ongoing, Progressing  Goal: Optimal Comfort and Wellbeing  Outcome: Ongoing, Progressing  Goal: Readiness for Transition of Care  Outcome: Ongoing, Progressing     Problem: Fall Injury Risk  Goal: Absence of Fall and Fall-Related Injury  Outcome: Ongoing, Progressing     Problem: Skin Injury Risk Increased  Goal: Skin Health and Integrity  Outcome: Ongoing, Progressing     Problem: Oral Intake Inadequate  Goal: Improved Oral Intake  Outcome: Ongoing, Progressing

## 2023-03-18 NOTE — PLAN OF CARE
Problem: Skin Injury Risk Increased  Goal: Skin Health and Integrity  Intervention: Promote and Optimize Oral Intake  Flowsheets (Taken 3/18/2023 0906)  Oral Nutrition Promotion:   calorie-dense foods provided   calorie-dense liquids provided   other (see comments)     Problem: Oral Intake Inadequate  Goal: Improved Oral Intake  Outcome: Ongoing, Progressing  Intervention: Promote and Optimize Oral Intake  Flowsheets (Taken 3/18/2023 0906)  Oral Nutrition Promotion:   calorie-dense foods provided   calorie-dense liquids provided   other (see comments)     Recommendations  1.) Continue cardiac diet with Ensure to promote intake.   2.) If appetite with no improvement within 48hr, suggest appetite stimulant.   3.) Suggest MVI for general health.   4.)  to aid in food preferences.     Goals:   1.) Pt to consume/tolerate >75% of meals and ONS.  Nutrition Goal Status: new

## 2023-03-18 NOTE — PROGRESS NOTES
"[facility] Medicine Progress Note  Patient Name: Mere Hoffmann MRN: [id number]   Patient Class: OP- Observation  Length of Stay: 0   Admission Date: 3/17/2023 12:50 AM Attending Physician: Carmelina Flores MD   Primary Care Provider: Vidal Kraus MD Face-to-Face encounter date: 03/18/2023   Chief Complaint: Fall (No LOC. + blood thinners)      Subjective:    Interval History   Reports orthostatic symptoms when patient tries to get up  Denies chest pain, shortness of breath, palpitations, abdominal pain, nausea/vomiting.   No concerns/issues overnight reported by the patient or the nursing staff.  Reviewed the labs and discussed the plan of care.   D/w daughter at bedside    Review of Systems   All other Review of Systems were found to be negative expect for that mentioned already in HPI.     Hospital Course     03/18/2023     amLODIPine  2.5 mg Oral Daily    atorvastatin  40 mg Oral Daily    carboxymethylcellulose  2 drop Both Eyes Q4H While awake    cyanocobalamin  1,000 mcg Oral Daily    dipyridamole-aspirin 200-25 mg  1 capsule Oral BID    fludrocortisone  100 mcg Oral BID    FLUoxetine  20 mg Oral Daily    levothyroxine  75 mcg Oral Before breakfast    megestroL  200 mg Oral Daily    pantoprazole  40 mg Oral BID       albumin human 25%, carboxymethylcellulose, dextrose 10%, dextrose 10%, glucagon (human recombinant), glucose, glucose, hydrALAZINE, lactated ringers, magnesium oxide, magnesium oxide, magnesium sulfate IVPB, magnesium sulfate IVPB, naloxone, ondansetron, potassium bicarbonate, potassium bicarbonate, potassium bicarbonate, potassium, sodium phosphates, potassium, sodium phosphates, potassium, sodium phosphates, promethazine (PHENERGAN) IVPB, sodium chloride 0.9%      Objective:   Physical Exam  BP (!) 151/86 (BP Location: Left arm, Patient Position: Standing)   Pulse 104   Temp 98.1 °F (36.7 °C) (Oral)   Resp 18   Ht 5' 2" (1.575 m)   Wt 59 kg (130 lb) Comment: " bed scale  SpO2 96%   Breastfeeding No   BMI 23.78 kg/m²   Constitutional: No distress.   HENT: Atraumatic.   Cardiovascular: Normal rate, regular rhythm and normal heart sounds.   Pulmonary/Chest: Effort normal. Clear to auscultation bilaterally. No wheezes.   Abdominal: Soft. Bowel sounds are normal. Exhibits no distension and no mass. No tenderness  Neurological: Alert.   Skin: Skin is warm and dry.     Labs and Imaging    Significant Labs: All pertinent labs within the past 24 hours have been reviewed.    Significant Imaging: I have reviewed all pertinent imaging results/findings within the past 24 hours.    I have reviewed the Vitals, labs and imaging as above.     Assessment & Plan:   Mere Hoffmann is a 89 y.o. female admitted for    Active Hospital Problems    Diagnosis    *Syncope    Hypertensive urgency    Stented coronary artery    Vascular dementia without behavioral disturbance    Hypothyroidism, unspecified       Plan  Agree with Kishore  Discontinued losartan  Continue Norvasc only if BP >160  Compression stocking  D/w with Dr Black  Possible discharge tomorrow    Active PT: No  Active OT: Yes  Active SLP: No    Core measures:  - Code status:   - Diet: Cardiac  VTE Risk Mitigation (From admission, onward)           Ordered     Place TIFFANIE hose  Until discontinued         03/17/23 1641     IP VTE HIGH RISK PATIENT  Once         03/17/23 0339     Place sequential compression device  Until discontinued         03/17/23 0339                    Discharge Planning:   Discharge Planning   KAIDEN: 03/19    Code Status: Full Code   Is the patient medically ready for discharge?: no    Reason for patient still in hospital (select all that apply): Patient trending condition  Discharge Plan A: Home with assistance from family        Above encounter included review of the medical records, interviewing and examining the patient face-to-face, discussion with family and other health care providers, ordering and  interpreting lab/test results and formulating a plan of care.     Medical Decision Making:  [] Low Complexity  [x] Moderate Complexity  [] High Complexity    Carmelina Flores MD  [facility] Hospitalist  03/18/2023

## 2023-03-18 NOTE — PROGRESS NOTES
[facility]  Department of Cardiology  Progress Note      PATIENT NAME: Mere Hoffmann    MRN: [id number]  TODAY'S DATE: 03/18/2023  ADMIT DATE: 3/17/2023                          CONSULT REQUESTED BY: Carmelina Flores MD    SUBJECTIVE     PRINCIPAL PROBLEM: Syncope    3/18/23:  Patient seen resting in bed. BP is more stable this morning at rest. Orthostatic BP has improved.       REASON FOR CONSULT:    From H&P:    Fall       No LOC. + blood thinners         HPI:  Patient presents from home after daughter brought the patient in after unwitnessed fall.  Patient had gotten up out of bed to go the bathroom, apparently fell face 1st on the floor.  Patient did not know how she fell or symptoms leading up to this; was in usual state of health afterwards, no tongue biting, incontinence, confusion.     Patient's daughter reports patient has a ongoing nausea, some evidence dehydration.  Patient's has had ongoing issues with uncontrolled hypertension along with orthostasis at times.  Currently maintained on Coreg.  PMD prescribed p.r.n. losartan and was given 1 dose of losartan 50 mg with resultant hypotension.  Patient's brought blood pressure log, has mostly supine hypertension in the 200s consistently, later in the day the blood pressure comes down.  Currently only taking Coreg 3.125 mg b.i.d..  Because of occasional hypotension has been given midodrine 2.5 mg q.12 hours for orthostasis.     CT head, C-spine without acute issue.  Vomited 1 time in the ER, given Zofran.  Hydralazine given with good effect.      Currently pain-free.  Review systems otherwise negative.      HPI:    Patient is a 89 year old female who presented to the Er with complaints of unwitnessed fall at home. Patient has known orthostatic hypotension and has been treated by other cardiologist and PCP for this. Her daughter lives with her and follows her BP closely. Unfortunately the patient's baseline BP tends to run very elevated so her  "daughter has not been giving her midodrine due to SBP in the 200s and higher at times. Patient does have nausea, poor appetite, and poor hydration over the past week or so. She does drink coffee to "regulate" her bowels but has also had bouts of diarrhea.         Review of patient's allergies indicates:   Allergen Reactions    Penicillins     Sulfa (sulfonamide antibiotics)        Past Medical History:   Diagnosis Date    Hyperlipidemia     Hypertension      Past Surgical History:   Procedure Laterality Date    CARDIAC CATHETERIZATION      CHOLECYSTECTOMY      CORONARY ANGIOPLASTY      moh's surgery  2019    basal cell ca     Social History     Tobacco Use    Smoking status: Former    Smokeless tobacco: Never   Substance Use Topics    Alcohol use: Not Currently    Drug use: Never        REVIEW OF SYSTEMS  CONSTITUTIONAL: Negative for chills, fatigue and fever.   EYES: No double vision, No blurred vision  NEURO: No headaches, + dizziness with position changes   RESPIRATORY: Negative for cough, shortness of breath and wheezing.    CARDIOVASCULAR: Negative for chest pain. Negative for palpitations and leg swelling.   GI: + intermittent incontinence; +diarrhea, nausea and vomiting.   : + incontinence   SKIN: Negative for bruising, Negative for edema or discoloration noted.   PSYCHIATRIC: + short term memory loss  MUSCULOSKELETAL: + fall at home     OBJECTIVE     VITAL SIGNS (Most Recent)  Temp: 98.1 °F (36.7 °C) (03/18/23 1135)  Pulse: 104 (03/18/23 1139)  Resp: 18 (03/18/23 1135)  BP: (!) 151/86 (03/18/23 1139)  SpO2: 96 % (03/18/23 1139)    VENTILATION STATUS  Resp: 18 (03/18/23 1135)  SpO2: 96 % (03/18/23 1139)       I & O (Last 24H):  Intake/Output Summary (Last 24 hours) at 3/18/2023 1309  Last data filed at 3/17/2023 1500  Gross per 24 hour   Intake 0 ml   Output --   Net 0 ml       WEIGHTS  Wt Readings from Last 1 Encounters:   03/18/23 0437 59 kg (130 lb)   03/17/23 1445 58.4 kg (128 lb 11.2 oz)   03/17/23 " 0100 58.1 kg (128 lb)   03/17/23 0055 58.5 kg (129 lb)       PHYSICAL EXAM  CONSTITUTIONAL: No fever, no chills  HEENT: Normocephalic, atraumatic,pupils reactive to light; + right eye periorbital bruising.                  NECK:  No JVD no carotid bruit  CVS: S1S2+, RRR  LUNGS: Clear  ABDOMEN: Soft, NT, BS+  EXTREMITIES: No cyanosis, edema  : No ramsey catheter  NEURO: AAO X 3 but forgetful and easily confused  PSY: Normal affect      HOME MEDICATIONS:  No current facility-administered medications on file prior to encounter.     Current Outpatient Medications on File Prior to Encounter   Medication Sig Dispense Refill    ascorbate calcium (NEFTALI-C ORAL) Take 0.5 tablets by mouth once daily.      atorvastatin (LIPITOR) 40 MG tablet Take 1 tablet (40 mg total) by mouth once daily. 90 tablet 1    carvediloL (COREG) 3.125 MG tablet Take 1 tablet (3.125 mg total) by mouth 2 (two) times daily. 180 tablet 1    cholecalciferol, vitamin D3, (VITAMIN D3) 50 mcg (2,000 unit) Cap Take 1 capsule by mouth once daily.      cyanocobalamin, vitamin B-12, 1,000 mcg TbSR Take 1 tablet by mouth once daily.      diphenoxylate-atropine 2.5-0.025 mg (LOMOTIL) 2.5-0.025 mg per tablet Take 1 tablet by mouth 4 (four) times daily as needed for Diarrhea.      dipyridamole-aspirin 200-25 mg (AGGRENOX)  mg CM12 TK 1 C PO BID (Patient taking differently: Take 1 capsule by mouth 2 (two) times daily. TK 1 C PO BID) 180 capsule 1    famotidine (PEPCID) 20 MG tablet Take 1 tablet (20 mg total) by mouth nightly as needed for Heartburn. 90 tablet 1    FLUoxetine 20 MG capsule Take 1 capsule (20 mg total) by mouth once daily. 90 capsule 1    losartan (COZAAR) 50 MG tablet Take 1 tablet (50 mg total) by mouth once daily. Prn systolic > 170 (Patient taking differently: Take 50 mg by mouth daily as needed. Prn systolic > 170) 30 tablet 3    midodrine (PROAMATINE) 2.5 MG Tab Take 1 tablet (2.5 mg total) by mouth every 12 (twelve) hours. for 15 days  30 tablet 1    multivitamin capsule Take 1 capsule by mouth once daily.      nitroGLYCERIN (NITROSTAT) 0.3 MG SL tablet Place 0.3 mg under the tongue every 5 (five) minutes as needed for Chest pain.      ondansetron (ZOFRAN-ODT) 4 MG TbDL Take 1 tablet (4 mg total) by mouth every 6 (six) hours as needed (nausea). 40 tablet 2    pantoprazole (PROTONIX) 40 MG tablet Take 1 tablet (40 mg total) by mouth once daily. 90 tablet 1    promethazine (PHENERGAN) 25 MG tablet Take 1 tablet (25 mg total) by mouth every 6 (six) hours as needed for Nausea. 15 tablet 0    UNITHROID 75 mcg tablet Take 1 tablet (75 mcg total) by mouth before breakfast. 90 tablet 1    clobetasoL (TEMOVATE) 0.05 % external solution Apply to scalp 1-2x/day prn itching/scaling (Patient not taking: Reported on 7/14/2022) 50 mL 11    denosumab (PROLIA) 60 mg/mL Syrg Inject 60 mg into the skin every 6 (six) months. Next dose May 2023      RESTASIS 0.05 % ophthalmic emulsion Place 1 drop into both eyes 2 (two) times daily.         SCHEDULED MEDS:   amLODIPine  2.5 mg Oral Daily    atorvastatin  40 mg Oral Daily    carboxymethylcellulose  2 drop Both Eyes Q4H While awake    cyanocobalamin  1,000 mcg Oral Daily    dipyridamole-aspirin 200-25 mg  1 capsule Oral BID    fludrocortisone  100 mcg Oral BID    FLUoxetine  20 mg Oral Daily    levothyroxine  75 mcg Oral Before breakfast    losartan  25 mg Oral QHS    megestroL  200 mg Oral Daily    pantoprazole  40 mg Oral BID       CONTINUOUS INFUSIONS:    PRN MEDS:albumin human 25%, carboxymethylcellulose, dextrose 10%, dextrose 10%, glucagon (human recombinant), glucose, glucose, hydrALAZINE, lactated ringers, magnesium oxide, magnesium oxide, magnesium sulfate IVPB, magnesium sulfate IVPB, naloxone, ondansetron, potassium bicarbonate, potassium bicarbonate, potassium bicarbonate, potassium, sodium phosphates, potassium, sodium phosphates, potassium, sodium phosphates, promethazine (PHENERGAN) IVPB, sodium chloride  0.9%    LABS AND DIAGNOSTICS     CBC LAST 3 DAYS  Recent Labs   Lab 03/17/23 0238 03/17/23 0425 03/18/23 0445   WBC 10.82 9.94 6.99   RBC 4.53 4.16 4.27   HGB 13.2 12.3 12.5   HCT 40.1 36.7* 37.8   MCV 89 88 89   MCH 29.1 29.6 29.3   MCHC 32.9 33.5 33.1   RDW 12.8 12.8 13.1    194 205   MPV 9.7 9.3 9.7   GRAN 74.2*  8.0* 83.5*  8.3* 65.6  4.6   LYMPH 17.7*  1.9 10.5*  1.0 22.7  1.6   MONO 6.1  0.7 4.9  0.5 9.7  0.7   BASO 0.05 0.04 0.03   NRBC 0 0 0         COAGULATION LAST 3 DAYS  Recent Labs   Lab 03/17/23 0238   INR 1.0         CHEMISTRY LAST 3 DAYS  Recent Labs   Lab 03/17/23 0238 03/17/23 0426 03/18/23 0445    137 138   K 3.5 3.2* 3.6    104 107   CO2 22* 24 26   ANIONGAP 13 9 5*   BUN 15 16 11   CREATININE 0.8 0.8 0.7    116* 122*   CALCIUM 8.7 8.3* 8.2*   MG  --  1.9  --    ALBUMIN 3.7  --   --    PROT 6.9  --   --    ALKPHOS 79  --   --    ALT 14  --   --    AST 21  --   --    BILITOT 1.1*  --   --          CARDIAC PROFILE LAST 3 DAYS  Recent Labs   Lab 03/17/23 0238 03/17/23 0426   BNP 63  --    TROPONINIHS 9.2 10.5         ENDOCRINE LAST 3 DAYS  Recent Labs   Lab 03/17/23  0232   TSH 3.240         LAST ARTERIAL BLOOD GAS  ABG  No results for input(s): PH, PO2, PCO2, HCO3, BE in the last 168 hours.    LAST 7 DAYS MICROBIOLOGY   Microbiology Results (last 7 days)       ** No results found for the last 168 hours. **            MOST RECENT IMAGING  Echo  · The left ventricle is normal in size with mild concentric hypertrophy   and normal systolic function.  · The estimated ejection fraction is 65%.  · Normal left ventricular diastolic function.  · Normal right ventricular size with normal right ventricular systolic   function.  · Normal central venous pressure (3 mmHg).  · The estimated PA systolic pressure is 22 mmHg.  · Mild aortic regurgitation.  · Trivial pericardial effusion.     US Doppler Abd/Retroperitoneal Limited  CLINICAL HISTORY:  89 years (3/4/1933)  Female labile hypertension    TECHNIQUE:  US RETROPERITONEUM AORTA. A complete ultrasound examination of the retroperitoneum and a color flow and spectral Doppler studies of the renal vasculature were performed.    COMPARISON:  None available.    FINDINGS:  Right kidney: Small in size and slightly increased in echotexture compatible with senescence and/or medical renal disease. There is no evidence of renal cysts, stones large enough to cause acoustic shadowing, contour-deforming mass, or hydronephrosis.  Renal size: 9.2 x 4.9 x 3.9 cm    Left kidney: Small in size and slightly increased in echotexture in keeping with senescence and/or medical renal disease. There is no evidence of renal cysts, stones large enough to cause acoustic shadowing, contour-deforming mass, or hydronephrosis.  Renal size: 9.3 x 5.1 x 4.7 cm    Urinary bladder: Normally distended. No stones, wall thickening, or focal mass.    Additional findings:  The visualized IVC is unremarkable. There are scattered calcified plaques throughout the abdominal aorta with no aneurysm identified in the visualized segments.    Vascular examination:  Abdominal aorta (PSV in the region of the main renal arteries): 223 centimeters per second    Right renal vascular exam: The resistive indices are within normal limits. The peak systolic velocities (PSV) in the main renal artery are within normal range. The right main renal artery to aorta peak systolic velocity ratio is normal. The right renal vein is patent.  Resistive index (upper, mid, lower): 0.59, 0.67, 0.65  Main renal artery PSV (origin, mid, hilar): 88 cm/s, 83 cm/s, and  83 cm/s  RAR/Ao PSV ratio: 0.4  Renal vein: Patent.    Left renal vascular exam: The resistive indices are within normal limits. The peak systolic velocities (PSV) in the main renal artery are within normal range. The left main renal artery to aorta peak systolic velocity ratio is normal. The left renal vein is patent.  Resistive index  (upper, mid, lower): 0.68, 0.5, and 0.45  Main renal artery PSV (origin, mid, hilar): 120 cm/s, 142 cm/s, and 1 38 cm/s  RAR/Ao PSV ratio: 0.6  Renal vein: Patent.    IMPRESSION:  1. Likely age-related renal parenchymal change and/or medical renal disease but with no hydronephrosis/shadowing stone, contour deforming renal mass or cyst.    2. No spectral Doppler findings of a hemodynamically significant renal artery stenosis.    3. Atherosclerotic plaques in the abdominal aorta, with relative elevated velocity.    Electronically signed by:  Mitul Brown MD  3/17/2023 7:26 AM CDT Workstation: [id number]  US Carotid Bilateral  CMS MANDATED QUALITY DATA - CAROTID - 195    All measurements and percent stenosis described below were determined using NASCET criteria or criteria similar to NASCET, as defined by the Society of Radiologists in Ultrasound Consensus Conference, Radiology, 2003    CLINICAL HISTORY:  89 years (3/4/1933) Female syncope    TECHNIQUE  US CAROTID DOPPLER BILATERAL Duplex sonographic ultrasound of the bilateral carotid arteries. Color and grayscale images were obtained.    COMPARISON:  None available.    FINDINGS:  For the RIGHT carotid artery, there is diffuse intimal thickening with a small calcified plaque in the right bulb. The highest peak systolic velocity noted in the right internal carotid artery is 112 cm/s.  The highest peak diastolic velocity noted in the right internal carotid artery is 14 cm/s. The ICA/CCA peak systolic ratio is 1.7. These values suggest less than 50% stenosis. The right vertebral artery shows antegrade flow. The external carotid artery is patent.    For the LEFT carotid artery, there is diffuse intimal thickening with a small partially calcified plaque in the left bulb. The highest peak systolic velocity noted in the left internal carotid artery is 104 cm/s.  The highest peak diastolic velocity noted in the left internal carotid artery is 14 cm/s. The ICA/CCA peak  systolic ratio is 1.1. These values suggest no clinically significant degree of stenosis. The left vertebral artery shows retrograde flow. The external carotid artery is patent.    IMPRESSION:  1. Small calcified plaque in the right carotid bulb, with velocities suggesting a less than 50% stenosis.  2. Partially calcified plaque in the left bulb with no clinically significant stenosis.  3. Retrograde flow in the left vertebral artery (suspicious for subclavian steal)  4. Antegrade flow in the right vertebral artery.    Electronically signed by:  Mitul Brown MD  3/17/2023 7:21 AM CDT Workstation: [id number]  CT Cervical Spine Without Contrast  EXAM DESCRIPTION: CT CERVICAL SPINE WITHOUT CONTRAST 3/17/2023 2:08 AM CDT    CLINICAL HISTORY: 89 years, Female, Neck trauma (Age >= 65y)    COMPARISON: None    PROCEDURE:  Multiple axial CT images through the cervical spine were obtained at 2 mm slice thickness at 2 mm interval reconstruction. In addition 2-D multiplanar reformats and the sagittal coronal plane were performed and reviewed.  This exam was performed according to our departmental dose-optimization protocol, which includes automated exposure control, adjustment of the mA and/or kV according to patient size and/or use of iterative reconstruction technique.    FINDINGS:  The alignment of the vertebral bodies are normal.  There is no evidence of fracture or subluxation. Their is anterior spurring with increased sclerosis along the anterior arch of C1/dens. There is degenerative disc disease with decreased intervertebral disc height, anterior spondylosis and posterior osteophyte complex at C3/C4 C4/C5 C5/C6/C7 and C7/T1. There is significant bone bridging at C5/C7 right side. There is minimal anterolisthesis of C4 over C5 from uncovertebral degenerative changes. There is significant spinal canal narrowing at C5/C6 C6/C7 there is uncovertebral degenerative changes C2-T1. There is no prevertebral soft tissue  swelling.  Sagittal coronal reformatted images demonstrate no subluxation or bony abnormalities.    IMPRESSION:  No evidence of acute fracture or subluxation of the cervical spine.    Multilevel degenerative disc disease and uncovertebral degenerative changes with significant spinal canal narrowing at C5/C6 and C6/C7.    Minimal anterolisthesis of C4 over C5 from uncovertebral degenerative changes.    Electronically signed by:  Diomedes Stephenson MD  3/17/2023 2:11 AM CDT Workstation: [id number]  CT Head Without Contrast  EXAM DESCRIPTION: CT HEAD WITHOUT CONTRAST 3/17/2023 2:01 AM CDT    CLINICAL HISTORY: 89 years, Female, Facial trauma, blunt    COMPARISON: None.    FINDINGS:    Multiple transaxial tomograms of the brain were obtained from the base of the skull to the vertex without contrast. 2-D multiplanar reformats and the coronal and sagittal plane were performed and reviewed.  This exam was performed according to our departmental dose-optimization protocol, which includes automated exposure control, adjustment of the mA and/or kV according to patient size and/or use of iterative reconstruction technique.    Brain parenchyma demonstrate mild prominence of the sulci and gyri are corresponding to mild cerebral and cerebellar atrophy. There is minimal periventricular white matter changes of microvascular ischemia. There is a small focal area of hypodensity within the right basal ganglia corresponding to old lacunar infarct There is no midline shift and/or mass effect. There is no evidence for acute intracranial hemorrhage.  Lateral ventricles and cisterns displace normal appearance.  No intra or extra axial fluid collections were seen. The calvarium is intact with no evidence for fracture. The visualized portions of the paranasal sinuses and orbits demonstrate to be clear. There is a anterior subcutaneous right frontal area of hyperdensity corresponding to site of injury/contusion.    IMPRESSION:  No acute  intracranial hemorrhage identified.    Mild brain atrophy with minimal periventricular white matter changes of microvascular ischemia.    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    Anterior subcutaneous right frontal area of hyperdensity corresponding to site of injury/contusion.    Electronically signed by:  Diomedes Stephenson MD  3/17/2023 2:02 AM CDT Workstation: [id number]      ECHOCARDIOGRAM RESULTS (last 5)  Results for orders placed during the hospital encounter of 03/17/23    Echo    Interpretation Summary  · The left ventricle is normal in size with mild concentric hypertrophy and normal systolic function.  · The estimated ejection fraction is 65%.  · Normal left ventricular diastolic function.  · Normal right ventricular size with normal right ventricular systolic function.  · Normal central venous pressure (3 mmHg).  · The estimated PA systolic pressure is 22 mmHg.  · Mild aortic regurgitation.  · Trivial pericardial effusion.      Results for orders placed during the hospital encounter of 12/02/21    Echo    Interpretation Summary  · Normal systolic function.  · The estimated ejection fraction is 65%.  · Grade I left ventricular diastolic dysfunction.  · Normal right ventricular size with normal right ventricular systolic function.  · Mild aortic regurgitation.      CURRENT/PREVIOUS VISIT EKG  Results for orders placed or performed during the hospital encounter of 03/17/23   EKG 12-lead    Collection Time: 03/17/23  2:38 AM    Narrative    Test Reason : R07.9,    Vent. Rate : 061 BPM     Atrial Rate : 061 BPM     P-R Int : 126 ms          QRS Dur : 090 ms      QT Int : 486 ms       P-R-T Axes : 041 -17 046 degrees     QTc Int : 489 ms    Normal sinus rhythm  Nonspecific ST and T wave abnormality  Abnormal ECG  When compared with ECG of 27-JUN-2022 14:54,  Previous ECG has undetermined rhythm, needs review  Nonspecific T wave abnormality now evident in Inferior leads  Nonspecific T wave abnormality  now evident in Lateral leads  QT has lengthened  Confirmed by Aydin Black MD ([id number]) on 3/17/2023 8:57:24 PM    Referred By: CONSUELO   SELF           Confirmed By:Aydin Black MD           ASSESSMENT/PLAN:     Active Hospital Problems    Diagnosis    *Syncope    Hypertensive urgency    Stented coronary artery    Vascular dementia without behavioral disturbance    Hypothyroidism, unspecified       ASSESSMENT & PLAN:     Abnormal neurocardiogenic axis  Profound orthostatic hypotension   Fall at home  HTN urgency  Dementia  Depression       RECOMMENDATIONS:    Continue florinef 100 mcg po twice daily.   Add metoprolol tartrate 12.5 mg pO BID.   Continue with PT and OT.   Recommend dietician to assist with dietary recs due to poor appetite.   Will follow up with her on Monday if she remains inpatient.       Alma Dunne NP  Department of Cardiology   [facility]  Date of Service: 03/18/2023  4:24 PM       I have personally interviewed and examined the patient. I have reviewed the Nurse Practitioner's history and physical, assessment, and plan. I agree with the findings and made appropriate changes as necessary in recommendations.  1. Patient probably will handle metoprolol tartrate at a low dose better than losartan or ACE inhibitors.  Discussed with patient in regards with it patient is agreeable continue Florinef.    Consideration for pantyhose compression stockings.      Carloz Black MD  Department of Cardiology  [facility]  03/18/2023 3:03 PM

## 2023-03-18 NOTE — CONSULTS
"Count includes the [facility]  Adult Nutrition   Consult Note (Initial Assessment)     SUMMARY     Recommendations  1.) Continue cardiac diet with Ensure to promote intake.   2.) If appetite with no improvement within 48hr, suggest appetite stimulant.   3.) Suggest MVI for general health.   4.)  to aid in food preferences.    Goals:   1.) Pt to consume/tolerate >75% of meals and ONS.  Nutrition Goal Status: new    Dietitian Rounds Brief  Pt presents to [facility] s/p fall. Per H&P, pt with poor appetite for unknown reason. Dtr to bedside at time of visit reports pt with lack of appetite. She reports attempting to have pt consume Ensures, however pt forgets. Ensures ordered by RD yesterday to encourage intake. Dtr unsure of pt has consumed any. Pt denies chew/swallowing issues. No GI distress. LBM 3/16. Unknown etiology for poor appetite other than age. Wt reviewed. UBW 60kg (11/2022); admit wt 59kg reflecting wt stability. NFPE completed with mild, age appropriate wasting. No malnutrition at this time.    Diet order:   Current Diet Order: cardiac   Oral Nutrition Supplement: Ensure HP with meals    Evaluation of Received Nutrient/Fluid Intake  Energy Calories Required: not meeting needs  Protein Required: not meeting needs  Fluid Required: meeting needs  Tolerance: tolerating     % Intake of Estimated Energy Needs: 0 - 25 %  % Meal Intake: 0 - 25 %      Intake/Output Summary (Last 24 hours) at 3/18/2023 0905  Last data filed at 3/17/2023 1500  Gross per 24 hour   Intake 0 ml   Output --   Net 0 ml        Anthropometrics  Temp: 97.2 °F (36.2 °C)  Height Method: Stated  Height: 5' 2" (157.5 cm)  Height (inches): 62 in  Weight Method: Bed Scale  Weight: 59 kg (130 lb) (bed scale)  Weight (lb): 130 lb  Ideal Body Weight (IBW), Female: 110 lb  % Ideal Body Weight, Female (lb): 117 %  BMI (Calculated): 23.8  BMI Grade: 18.5-24.9 - normal       Estimated/Assessed Needs  Weight Used For Calorie Calculations: 59 kg (130 lb 1.1 " oz)  Energy Calorie Requirements (kcal): 1628-8379  Energy Need Method: Kcal/kg (25-30)  Protein Requirements: 47-59 (0.8-1.0)  Weight Used For Protein Calculations: 59 kg (130 lb 1.1 oz)  Fluid Requirements (mL): 0093-9658     RDA Method (mL): 1475       Reason for Assessment  Reason For Assessment: consult  Diagnosis: other (see comments) (syncope)  Relevant Medical History: HLD, HTN    Nutrition/Diet History  Spiritual, Cultural Beliefs, Sikhism Practices, Values that Affect Care: no  Food Allergies: NKFA  Factors Affecting Nutritional Intake: decreased appetite    Nutrition Risk Screen  Nutrition Risk Screen: no indicators present     MST Score: 3  Have you recently lost weight without trying?: Yes: 14-23 lbs  Weight loss score: 2  Have you been eating poorly because of a decreased appetite?: Yes  Appetite score: 1       Weight History:  Wt Readings from Last 5 Encounters:   03/18/23 59 kg (130 lb)   03/17/23 58.1 kg (128 lb)   02/20/23 58.1 kg (128 lb)   11/23/22 60.2 kg (132 lb 11.2 oz)   11/03/22 61.6 kg (135 lb 12.9 oz)        Lab/Procedures/Meds: Pertinent Labs/Meds Reviewed    Medications:Pertinent Medications Reviewed  Scheduled Meds:   amLODIPine  2.5 mg Oral Daily    atorvastatin  40 mg Oral Daily    carboxymethylcellulose  2 drop Both Eyes Q4H While awake    cyanocobalamin  1,000 mcg Oral Daily    dipyridamole-aspirin 200-25 mg  1 capsule Oral BID    fludrocortisone  100 mcg Oral BID    FLUoxetine  20 mg Oral Daily    levothyroxine  75 mcg Oral Before breakfast    losartan  25 mg Oral QHS    pantoprazole  40 mg Oral BID     Continuous Infusions:  PRN Meds:.albumin human 25%, carboxymethylcellulose, dextrose 10%, dextrose 10%, glucagon (human recombinant), glucose, glucose, hydrALAZINE, lactated ringers, magnesium oxide, magnesium oxide, magnesium sulfate IVPB, magnesium sulfate IVPB, naloxone, ondansetron, potassium bicarbonate, potassium bicarbonate, potassium bicarbonate, potassium, sodium  phosphates, potassium, sodium phosphates, potassium, sodium phosphates, promethazine (PHENERGAN) IVPB, sodium chloride 0.9%    Labs: Pertinent Labs Reviewed  Clinical Chemistry:  Recent Labs   Lab 03/17/23  0238 03/17/23  0426 03/18/23  0445    137 138   K 3.5 3.2* 3.6    104 107   CO2 22* 24 26    116* 122*   BUN 15 16 11   CREATININE 0.8 0.8 0.7   CALCIUM 8.7 8.3* 8.2*   PROT 6.9  --   --    ALBUMIN 3.7  --   --    BILITOT 1.1*  --   --    ALKPHOS 79  --   --    AST 21  --   --    ALT 14  --   --    ANIONGAP 13 9 5*   MG  --  1.9  --      CBC:   Recent Labs   Lab 03/18/23 0445   WBC 6.99   RBC 4.27   HGB 12.5   HCT 37.8      MCV 89   MCH 29.3   MCHC 33.1     Cardiac Profile:  Recent Labs   Lab 03/17/23  0238   BNP 63     Thyroid & Parathyroid:  Recent Labs   Lab 03/17/23  0232   TSH 3.240       Monitor and Evaluation  Food and Nutrient Intake: energy intake, food and beverage intake  Food and Nutrient Adminstration: diet order  Knowledge/Beliefs/Attitudes: food and nutrition knowledge/skill  Physical Activity and Function: nutrition-related ADLs and IADLs  Anthropometric Measurements: weight, weight change  Biochemical Data, Medical Tests and Procedures: electrolyte and renal panel, gastrointestinal profile, glucose/endocrine profile  Nutrition-Focused Physical Findings: overall appearance     Nutrition Risk  Level of Risk/Frequency of Follow-up: moderate     Nutrition Follow-Up  RD Follow-up?: Yes      Shilpi Peña RD 03/18/2023 9:05 AM

## 2023-03-19 VITALS
HEIGHT: 62 IN | DIASTOLIC BLOOD PRESSURE: 63 MMHG | OXYGEN SATURATION: 98 % | RESPIRATION RATE: 16 BRPM | WEIGHT: 126.38 LBS | BODY MASS INDEX: 23.25 KG/M2 | HEART RATE: 65 BPM | TEMPERATURE: 98 F | SYSTOLIC BLOOD PRESSURE: 140 MMHG

## 2023-03-19 LAB
ANION GAP SERPL CALC-SCNC: 9 MMOL/L (ref 8–16)
BASOPHILS # BLD AUTO: 0.04 K/UL (ref 0–0.2)
BASOPHILS NFR BLD: 0.5 % (ref 0–1.9)
BUN SERPL-MCNC: 11 MG/DL (ref 8–23)
CALCIUM SERPL-MCNC: 8.5 MG/DL (ref 8.7–10.5)
CHLORIDE SERPL-SCNC: 106 MMOL/L (ref 95–110)
CO2 SERPL-SCNC: 23 MMOL/L (ref 23–29)
CREAT SERPL-MCNC: 0.6 MG/DL (ref 0.5–1.4)
DIFFERENTIAL METHOD: ABNORMAL
EOSINOPHIL # BLD AUTO: 0.1 K/UL (ref 0–0.5)
EOSINOPHIL NFR BLD: 1.4 % (ref 0–8)
ERYTHROCYTE [DISTWIDTH] IN BLOOD BY AUTOMATED COUNT: 13.2 % (ref 11.5–14.5)
EST. GFR  (NO RACE VARIABLE): >60 ML/MIN/1.73 M^2
GLUCOSE SERPL-MCNC: 102 MG/DL (ref 70–110)
HCT VFR BLD AUTO: 34.7 % (ref 37–48.5)
HGB BLD-MCNC: 11.5 G/DL (ref 12–16)
IMM GRANULOCYTES # BLD AUTO: 0.02 K/UL (ref 0–0.04)
IMM GRANULOCYTES NFR BLD AUTO: 0.2 % (ref 0–0.5)
LYMPHOCYTES # BLD AUTO: 2.1 K/UL (ref 1–4.8)
LYMPHOCYTES NFR BLD: 24.6 % (ref 18–48)
MCH RBC QN AUTO: 29.7 PG (ref 27–31)
MCHC RBC AUTO-ENTMCNC: 33.1 G/DL (ref 32–36)
MCV RBC AUTO: 90 FL (ref 82–98)
MONOCYTES # BLD AUTO: 0.8 K/UL (ref 0.3–1)
MONOCYTES NFR BLD: 9.4 % (ref 4–15)
NEUTROPHILS # BLD AUTO: 5.4 K/UL (ref 1.8–7.7)
NEUTROPHILS NFR BLD: 63.9 % (ref 38–73)
NRBC BLD-RTO: 0 /100 WBC
PLATELET # BLD AUTO: 195 K/UL (ref 150–450)
PMV BLD AUTO: 9.5 FL (ref 9.2–12.9)
POTASSIUM SERPL-SCNC: 3.6 MMOL/L (ref 3.5–5.1)
RBC # BLD AUTO: 3.87 M/UL (ref 4–5.4)
SODIUM SERPL-SCNC: 138 MMOL/L (ref 136–145)
WBC # BLD AUTO: 8.41 K/UL (ref 3.9–12.7)

## 2023-03-19 PROCEDURE — 85025 COMPLETE CBC W/AUTO DIFF WBC: CPT | Performed by: INTERNAL MEDICINE

## 2023-03-19 PROCEDURE — G0378 HOSPITAL OBSERVATION PER HR: HCPCS

## 2023-03-19 PROCEDURE — 25000003 PHARM REV CODE 250: Performed by: INTERNAL MEDICINE

## 2023-03-19 PROCEDURE — 36415 COLL VENOUS BLD VENIPUNCTURE: CPT | Performed by: INTERNAL MEDICINE

## 2023-03-19 PROCEDURE — 25000003 PHARM REV CODE 250: Performed by: GENERAL PRACTICE

## 2023-03-19 PROCEDURE — 63600175 PHARM REV CODE 636 W HCPCS: Performed by: HOSPITALIST

## 2023-03-19 PROCEDURE — 96376 TX/PRO/DX INJ SAME DRUG ADON: CPT

## 2023-03-19 PROCEDURE — 80048 BASIC METABOLIC PNL TOTAL CA: CPT | Mod: XB | Performed by: INTERNAL MEDICINE

## 2023-03-19 PROCEDURE — S0179 MEGESTROL 20 MG: HCPCS | Performed by: INTERNAL MEDICINE

## 2023-03-19 PROCEDURE — 25000003 PHARM REV CODE 250: Performed by: NURSE PRACTITIONER

## 2023-03-19 RX ORDER — FLUDROCORTISONE ACETATE 0.1 MG/1
100 TABLET ORAL 2 TIMES DAILY
Qty: 60 TABLET | Refills: 0 | Status: SHIPPED | OUTPATIENT
Start: 2023-03-19 | End: 2023-04-18

## 2023-03-19 RX ORDER — METOPROLOL TARTRATE 25 MG/1
12.5 TABLET, FILM COATED ORAL 2 TIMES DAILY
Qty: 30 TABLET | Refills: 0 | Status: SHIPPED | OUTPATIENT
Start: 2023-03-19 | End: 2023-04-20 | Stop reason: ALTCHOICE

## 2023-03-19 RX ORDER — MEGESTROL ACETATE 40 MG/ML
200 SUSPENSION ORAL DAILY
Qty: 150 ML | Refills: 0 | Status: SHIPPED | OUTPATIENT
Start: 2023-03-20 | End: 2023-04-26 | Stop reason: SDUPTHER

## 2023-03-19 RX ADMIN — FLUDROCORTISONE ACETATE 100 MCG: 0.1 TABLET ORAL at 09:03

## 2023-03-19 RX ADMIN — ATORVASTATIN CALCIUM 40 MG: 40 TABLET, FILM COATED ORAL at 09:03

## 2023-03-19 RX ADMIN — ONDANSETRON 4 MG: 2 INJECTION INTRAMUSCULAR; INTRAVENOUS at 12:03

## 2023-03-19 RX ADMIN — LEVOTHYROXINE SODIUM 75 MCG: 0.03 TABLET ORAL at 06:03

## 2023-03-19 RX ADMIN — CARBOXYMETHYLCELLULOSE SODIUM 2 DROP: 0.5 SOLUTION, GEL FORMING / DROPS OPHTHALMIC at 06:03

## 2023-03-19 RX ADMIN — POTASSIUM BICARBONATE 50 MEQ: 977.5 TABLET, EFFERVESCENT ORAL at 09:03

## 2023-03-19 RX ADMIN — FLUOXETINE 20 MG: 20 CAPSULE ORAL at 09:03

## 2023-03-19 RX ADMIN — ASPIRIN AND EXTENDED-RELEASE DIPYRIDAMOLE 1 CAPSULE: 25; 200 CAPSULE ORAL at 09:03

## 2023-03-19 RX ADMIN — MEGESTROL ACETATE 200 MG: 400 SUSPENSION ORAL at 09:03

## 2023-03-19 RX ADMIN — PANTOPRAZOLE SODIUM 40 MG: 40 TABLET, DELAYED RELEASE ORAL at 06:03

## 2023-03-19 RX ADMIN — CARBOXYMETHYLCELLULOSE SODIUM 2 DROP: 0.5 SOLUTION, GEL FORMING / DROPS OPHTHALMIC at 09:03

## 2023-03-19 RX ADMIN — CYANOCOBALAMIN TAB 1000 MCG 1000 MCG: 1000 TAB at 09:03

## 2023-03-19 RX ADMIN — METOPROLOL TARTRATE 12.5 MG: 25 TABLET, FILM COATED ORAL at 09:03

## 2023-03-19 NOTE — PLAN OF CARE
Discharge orders and chart reviewed with no further post-acute discharge needs identified at this time.  At this time, patient is cleared for discharge from Case Management standpoint.        03/19/23 1155   Final Note   Assessment Type Final Discharge Note   Anticipated Discharge Disposition Home   What phone number can be called within the next 1-3 days to see how you are doing after discharge? [phone]   Post-Acute Status   Discharge Delays None known at this time

## 2023-03-19 NOTE — DISCHARGE SUMMARY
"[facility]  Discharge Summary  Patient Name: Mere Hoffmann MRN: [id number]   Patient Class: OP- Observation  Length of Stay: 0   Admission Date: 3/17/2023 12:50 AM Attending Physician: Carmelina Flores MD   Primary Care Provider: Vidal Kraus MD Face-to-Face encounter date: 03/19/2023   Chief Complaint: Fall (No LOC. + blood thinners)    Date of Discharge: 3/19/2023  Discharge Disposition:Home-Health Care Sv    Condition: Stable       Reason for Hospitalization     Active Hospital Problems    Diagnosis    *Orthostatic hypotension    Syncope    Stented coronary artery    Vascular dementia without behavioral disturbance    Hypothyroidism, unspecified         Brief History of Present Illness    Mere Hoffmann is a 89 y.o.  female who  has a past medical history of Hyperlipidemia and Hypertension.. The patient presented to [facility] on 3/17/2023 with a primary complaint of Fall (No LOC. + blood thinners)  .     For the full HPI please refer to the History & Physical from this admission.    Hospital Course By Problem with Pertinent Findings     Admitted for presyncope secondary to orthostatic hypotension. Cardiology evaluated the patient and started her on Florinef and stopped all blood pressure medications. Carvedilol was changed to metoprolol. Patient was also given Megace for appetite stimulation. Patient clinically improved and orthostatic blood pressures improved. Discharged to follow up with PCP and cardiologist in a week time    Patient was seen and examined on the date of discharge and determined to be suitable for discharge.    Physical Exam  BP (!) 171/69 (BP Location: Right arm, Patient Position: Lying)   Pulse 65   Temp 98.2 °F (36.8 °C) (Oral)   Resp 16   Ht 5' 2" (1.575 m)   Wt 57.3 kg (126 lb 6 oz)   SpO2 96%   Breastfeeding No   BMI 23.11 kg/m²   Vitals reviewed.    Constitutional: No distress.   HENT: Atraumatic.   Cardiovascular: Normal rate, regular " rhythm and normal heart sounds.   Pulmonary/Chest: Effort normal. Clear to auscultation bilaterally. No wheezes.   Abdominal: Soft. Bowel sounds are normal. Exhibits no distension and no mass. No tenderness  Neurological: Alert.   Skin: Skin is warm and dry.     Following labs were Reviewed   Recent Labs   Lab 03/19/23 0455 03/19/23  0456   WBC  --  8.41   HGB  --  11.5*   HCT  --  34.7*   PLT  --  195   CALCIUM 8.5*  --      --    K 3.6  --    CO2 23  --      --    BUN 11  --    CREATININE 0.6  --      No results found for: POCTGLUCOSE     All labs within the past 24 hours have been reviewed    Microbiology Results (last 7 days)       ** No results found for the last 168 hours. **          US Doppler Abd/Retroperitoneal Limited   Final Result      US Carotid Bilateral   Final Result      CT Cervical Spine Without Contrast   Final Result      CT Head Without Contrast   Final Result          No results found for this or any previous visit.      Consultants and Procedures   Consultants:  Consults (From admission, onward)          Status Ordering Provider     Inpatient consult to Registered Dietitian/Nutritionist  Once        Provider:  (Not yet assigned)    Completed KAYLEE ARIAS     Inpatient consult to Cardiology  Once        Provider:  Arturo Owens MD    Completed KAYLEE ARIAS            Procedures:   * No surgery found *     Discharge Information:   Diet:  Resume regular diet    Physical Activity:  Activity as tolerated    Instructions:  1. Take all medications as prescribed  2. Keep all follow-up appointments  3. Return to the hospital or call your primary care physicians if any worsening symptoms such as dizziness, lethargy occur.    Follow-Up Appointments:  Please call your primary care physician to schedule an appointment in 1 week time.     Follow-up Information       Vidal Kraus MD Follow up.    Specialties: Family Medicine, Home Health Services, Hospice Services  Why:  follow up apt requested. office will contact you for scheduling  Contact information:  [address]  [address]  [place] [address]  [phone]               Vidal Kraus MD Follow up in 1 week(s).    Specialties: Family Medicine, Home Health Services, Hospice Services  Contact information:  [address]  [address]  [place] [address]  [phone]                                Pending laboratory work/Tests to be performed/followed by the Primary care Physician: none    The patient was discharged in the care of her parents//wife/family/caregiver, with discharge instructions were reviewed in written and verbal form. All pertinent questions were discussed and prescriptions were provided. The importance of making follow up appointments and compliance of medications has been stressed repeatedly. The patient will follow up in 1 week or sooner as needed with the PCP, and the patient is on board with the plan. Upon discharge, patient needs to be on following medications.    Discharge Medications:     Medication List        START taking these medications      fludrocortisone 0.1 mg Tab  Commonly known as: FLORINEF  Take 1 tablet (100 mcg total) by mouth 2 (two) times daily.     megestroL 400 mg/10 mL (10 mL) Susp  Commonly known as: MEGACE  Take 5 mLs (200 mg total) by mouth once daily.  Start taking on: March 20, 2023     metoprolol tartrate 25 MG tablet  Commonly known as: LOPRESSOR  Take 0.5 tablets (12.5 mg total) by mouth 2 (two) times daily.            CHANGE how you take these medications      dipyridamole-aspirin 200-25 mg  mg Cm12  Commonly known as: AGGRENOX  TK 1 C PO BID  What changed:   how much to take  how to take this  when to take this            CONTINUE taking these medications      atorvastatin 40 MG tablet  Commonly known as: LIPITOR  Take 1 tablet (40 mg total) by mouth once daily.     cholecalciferol (vitamin D3) 50 mcg  (2,000 unit) Cap capsule  Commonly known as: VITAMIN D3     cyanocobalamin (vitamin B-12) 1,000 mcg Tbsr     diphenoxylate-atropine 2.5-0.025 mg 2.5-0.025 mg per tablet  Commonly known as: LOMOTIL     famotidine 20 MG tablet  Commonly known as: PEPCID  Take 1 tablet (20 mg total) by mouth nightly as needed for Heartburn.     FLUoxetine 20 MG capsule  Take 1 capsule (20 mg total) by mouth once daily.     multivitamin capsule     nitroGLYCERIN 0.3 MG SL tablet  Commonly known as: NITROSTAT     ondansetron 4 MG Tbdl  Commonly known as: ZOFRAN-ODT  Take 1 tablet (4 mg total) by mouth every 6 (six) hours as needed (nausea).     pantoprazole 40 MG tablet  Commonly known as: PROTONIX  Take 1 tablet (40 mg total) by mouth once daily.     PROLIA 60 mg/mL Syrg  Generic drug: denosumab     promethazine 25 MG tablet  Commonly known as: PHENERGAN  Take 1 tablet (25 mg total) by mouth every 6 (six) hours as needed for Nausea.     RESTASIS 0.05 % ophthalmic emulsion  Generic drug: cycloSPORINE     UNITHROID 75 MCG tablet  Generic drug: levothyroxine  Take 1 tablet (75 mcg total) by mouth before breakfast.            STOP taking these medications      carvediloL 3.125 MG tablet  Commonly known as: COREG     clobetasoL 0.05 % external solution  Commonly known as: TEMOVATE     NEFTALI-C ORAL     losartan 50 MG tablet  Commonly known as: COZAAR     midodrine 2.5 MG Tab  Commonly known as: PROAMATINE               Where to Get Your Medications        These medications were sent to [id number] - [place] - [address] [facility]  [address] [place] [address]      Phone: [phone]   fludrocortisone 0.1 mg Tab  megestroL 400 mg/10 mL (10 mL) Susp  metoprolol tartrate 25 MG tablet           I spent 30 minutes preparing the discharge including reviewing records from previous encounters, preparation of discharge summary, assessing and final examination of the patient, discharge  medicine reconciliation, discussing plan of care, follow up and education and prescriptions.       Carmelina Flores  [facility] Hospitalist  03/19/2023

## 2023-03-19 NOTE — PLAN OF CARE
Problem: Adult Inpatient Plan of Care  Goal: Plan of Care Review  Outcome: Met  Goal: Patient-Specific Goal (Individualized)  Outcome: Met  Goal: Absence of Hospital-Acquired Illness or Injury  Outcome: Met  Goal: Optimal Comfort and Wellbeing  Outcome: Met  Goal: Readiness for Transition of Care  Outcome: Met     Problem: Fall Injury Risk  Goal: Absence of Fall and Fall-Related Injury  Outcome: Met     Problem: Skin Injury Risk Increased  Goal: Skin Health and Integrity  Outcome: Met     Problem: Oral Intake Inadequate  Goal: Improved Oral Intake  Outcome: Met

## 2023-03-19 NOTE — PLAN OF CARE
Per liaison Marjorie with [facility], patients start of care date is set for 3/21/23.

## 2023-03-19 NOTE — PLAN OF CARE
Spoke with patient at bedside concerning Home Health, to which patient verbalizes understanding and agreement.  Patient choice form signed and scanned into [employer].  Referral sent to the following agency via [facility]: [facility].    Due to weekend closure  will follow up with [facility] on Monday 3/20/23 for start of care date.    Discharge orders and chart reviewed with no further post-acute discharge needs identified at this time.  At this time, patient is cleared for discharge from Case Management standpoint.        03/19/23 1551   Final Note   Assessment Type Final Discharge Note   Anticipated Discharge Disposition Home-Health   What phone number can be called within the next 1-3 days to see how you are doing after discharge? [phone]   Post-Acute Status   Post-Acute Authorization Home [facility]   Home Health Status Referrals Sent   Patient choice form signed by patient/caregiver List with quality metrics by geographic area provided   Discharge Delays None known at this time

## 2023-03-20 ENCOUNTER — TELEPHONE (OUTPATIENT)
Dept: FAMILY MEDICINE | Facility: CLINIC | Age: 88
End: 2023-03-20

## 2023-03-20 NOTE — PT/OT/SLP DISCHARGE
Occupational Therapy Discharge Summary    Mere Hoffmann  MRN: [id number]   Principal Problem: Orthostatic hypotension      Patient Discharged from acute Occupational Therapy on 3/19/2023.  Please refer to prior OT note dated 3/17/2023 for functional status.    Assessment:      Patient has not met goals.    Objective:     GOALS:   Multidisciplinary Problems       Occupational Therapy Goals       Not on file              Multidisciplinary Problems (Resolved)          Problem: Occupational Therapy    Goal Priority Disciplines Outcome Interventions   Occupational Therapy Goal   (Resolved)     OT, PT/OT Met    Description: Goals to be met by: 4/17/2023     Patient will increase functional independence with ADLs by performing:    UE Dressing with Supervision.  LE Dressing with Supervision.  Grooming while standing at sink with Supervision.  Toileting from toilet with Supervision for hygiene and clothing management.   Toilet transfer to toilet with Supervision.                         Reasons for Discontinuation of Therapy Services  Patient d/c home.       Plan:     Patient Discharged to: Home with Home Health Service    3/20/2023

## 2023-03-21 ENCOUNTER — PATIENT OUTREACH (OUTPATIENT)
Dept: FAMILY MEDICINE | Facility: CLINIC | Age: 88
End: 2023-03-21

## 2023-03-21 LAB
DOPAMINE SERPL-MCNC: <30 PG/ML (ref 0–48)
EPINEPH PLAS-MCNC: 28 PG/ML (ref 0–62)
NOREPINEPH PLAS-MCNC: 301 PG/ML (ref 0–874)

## 2023-03-21 PROCEDURE — G0180 PR HOME HEALTH MD CERTIFICATION: ICD-10-PCS | Mod: ,,, | Performed by: FAMILY MEDICINE

## 2023-03-21 PROCEDURE — G0180 MD CERTIFICATION HHA PATIENT: HCPCS | Mod: ,,, | Performed by: FAMILY MEDICINE

## 2023-03-21 NOTE — TELEPHONE ENCOUNTER
Spoke with patients daughter, states she is doing a little better since discharge. They gave her Megace for her appetite and that as helped mildly. They put her on a very mild dose of Lopressor and Florinef, stopped her Coreg. States they basically told her there wasn't much they were able to do for her at this time in regards to her blood pressure. The daughter states she doesn't feel like she needs to be seen for a hospital follow up - states they ordered physical therapy to come out because she isn't able to walk at this time - will let ue know if anything changes or if she needs anything from us.

## 2023-03-21 NOTE — PROGRESS NOTES
Discharge Information     Discharge Date:   3/19    Primary Discharge Diagnosis:  hypotension      Discharge Summary:  Reviewed      Medication & Order Review     Were medication changes made or new medications added?   Yes    If so, has the patient filled the prescriptions?  Yes     Was Home Health ordered? Yes    If so, has Home Health contacted patient and/or initiated services?  No    Name of Home Health Agency? N/A    Durable Medical Equipment ordered?  No     If so, has the DME provider contacted patient and delivered equipment?  N/A    Follow Up               Any problems since discharge? No    How is the patient feeling since returning home?      Have you set up recommended follow up appointments?  (cardiology, surgery, etc.)    Schedule Hospital Follow-up appointment within 7-14 days (preferably 7).      Notes:  Spoke with patients daughter, states she is doing a little better since discharge. They gave her Megace for her appetite and that as helped mildly. They put her on a very mild dose of Lopressor and Florinef, stopped her Coreg. States they basically told her there wasn't much they were able to do for her at this time in regards to her blood pressure. The daughter states she doesn't feel like she needs to be seen for a hospital follow up - states they ordered physical therapy to come out because she isn't able to walk at this time - will let ue know if anything changes or if she needs anything from us.       Ilda Chaney

## 2023-03-26 LAB — ALDOST SERPL-MCNC: <1 NG/DL (ref 0–30)

## 2023-03-27 LAB — RENIN PLAS-CCNC: NORMAL NG/ML/HR

## 2023-04-18 ENCOUNTER — DOCUMENT SCAN (OUTPATIENT)
Dept: HOME HEALTH SERVICES | Facility: HOSPITAL | Age: 88
End: 2023-04-18
Payer: MEDICARE

## 2023-04-20 ENCOUNTER — OFFICE VISIT (OUTPATIENT)
Dept: CARDIOLOGY | Facility: CLINIC | Age: 88
End: 2023-04-20
Payer: MEDICARE

## 2023-04-20 VITALS
SYSTOLIC BLOOD PRESSURE: 139 MMHG | BODY MASS INDEX: 22.45 KG/M2 | HEIGHT: 62 IN | WEIGHT: 122 LBS | HEART RATE: 75 BPM | DIASTOLIC BLOOD PRESSURE: 65 MMHG

## 2023-04-20 DIAGNOSIS — I25.118 ATHEROSCLEROTIC HEART DISEASE OF NATIVE CORONARY ARTERY WITH OTHER FORMS OF ANGINA PECTORIS: Primary | Chronic | ICD-10-CM

## 2023-04-20 DIAGNOSIS — R09.89 LABILE HYPERTENSION: Chronic | ICD-10-CM

## 2023-04-20 DIAGNOSIS — I35.1 NONRHEUMATIC AORTIC VALVE INSUFFICIENCY: Chronic | ICD-10-CM

## 2023-04-20 DIAGNOSIS — I70.0 AORTIC ATHEROSCLEROSIS: Chronic | ICD-10-CM

## 2023-04-20 DIAGNOSIS — E78.5 HYPERLIPIDEMIA, UNSPECIFIED HYPERLIPIDEMIA TYPE: Chronic | ICD-10-CM

## 2023-04-20 DIAGNOSIS — I10 ESSENTIAL HYPERTENSION: ICD-10-CM

## 2023-04-20 DIAGNOSIS — E78.00 PURE HYPERCHOLESTEROLEMIA: Chronic | ICD-10-CM

## 2023-04-20 DIAGNOSIS — I65.23 CAROTID ARTERY PLAQUE, BILATERAL: Chronic | ICD-10-CM

## 2023-04-20 PROCEDURE — 99999 PR PBB SHADOW E&M-EST. PATIENT-LVL III: CPT | Mod: PBBFAC,,, | Performed by: INTERNAL MEDICINE

## 2023-04-20 PROCEDURE — 99214 OFFICE O/P EST MOD 30 MIN: CPT | Mod: S$PBB,,, | Performed by: INTERNAL MEDICINE

## 2023-04-20 PROCEDURE — 99213 OFFICE O/P EST LOW 20 MIN: CPT | Mod: PBBFAC,PO | Performed by: INTERNAL MEDICINE

## 2023-04-20 PROCEDURE — 99999 PR PBB SHADOW E&M-EST. PATIENT-LVL III: ICD-10-PCS | Mod: PBBFAC,,, | Performed by: INTERNAL MEDICINE

## 2023-04-20 PROCEDURE — 99214 PR OFFICE/OUTPT VISIT, EST, LEVL IV, 30-39 MIN: ICD-10-PCS | Mod: S$PBB,,, | Performed by: INTERNAL MEDICINE

## 2023-04-20 RX ORDER — METOPROLOL SUCCINATE 25 MG/1
12.5 TABLET, EXTENDED RELEASE ORAL NIGHTLY
Qty: 45 TABLET | Refills: 1 | Status: SHIPPED | OUTPATIENT
Start: 2023-04-20 | End: 2023-09-06

## 2023-04-20 RX ORDER — ATORVASTATIN CALCIUM 40 MG/1
40 TABLET, FILM COATED ORAL DAILY
Qty: 90 TABLET | Refills: 1 | Status: SHIPPED | OUTPATIENT
Start: 2023-04-20

## 2023-04-20 NOTE — PROGRESS NOTES
Subjective:    Patient ID:  Mere Hoffmann is a 89 y.o. female who presents for Hypertension and Coronary Artery Disease        HPI    RECENT LABS BMP OK HEMOGLOBIN 11.5, HAD A FALL, WAS AMH, BP WAS FLUCTUATING. NO FX, ON LOPRESSOR  AND FLORINEF, THEN DAUGHTER STOPPED FLORINEF, BP WAS UP, AND TAKING 1/2 LOPRESSOR AT NIGHT FOR HIGH BP IN AM,   Past Medical History:   Diagnosis Date    Hyperlipidemia     Hypertension      Past Surgical History:   Procedure Laterality Date    CARDIAC CATHETERIZATION      CHOLECYSTECTOMY      CORONARY ANGIOPLASTY      moh's surgery  2019    basal cell ca     Family History   Problem Relation Age of Onset    Heart disease Mother      Social History     Socioeconomic History    Marital status:    Tobacco Use    Smoking status: Former    Smokeless tobacco: Never   Substance and Sexual Activity    Alcohol use: Not Currently    Drug use: Never       Review of patient's allergies indicates:   Allergen Reactions    Penicillins     Sulfa (sulfonamide antibiotics)        Current Outpatient Medications:     cholecalciferol, vitamin D3, (VITAMIN D3) 50 mcg (2,000 unit) Cap, Take 1 capsule by mouth once daily., Disp: , Rfl:     cyanocobalamin, vitamin B-12, 1,000 mcg TbSR, Take 1 tablet by mouth once daily., Disp: , Rfl:     denosumab (PROLIA) 60 mg/mL Syrg, Inject 60 mg into the skin every 6 (six) months. Next dose May 2023, Disp: , Rfl:     diphenoxylate-atropine 2.5-0.025 mg (LOMOTIL) 2.5-0.025 mg per tablet, Take 1 tablet by mouth 4 (four) times daily as needed for Diarrhea., Disp: , Rfl:     dipyridamole-aspirin 200-25 mg (AGGRENOX)  mg CM12, TK 1 C PO BID (Patient taking differently: Take 1 capsule by mouth 2 (two) times daily. TK 1 C PO BID), Disp: 180 capsule, Rfl: 1    famotidine (PEPCID) 20 MG tablet, Take 1 tablet (20 mg total) by mouth nightly as needed for Heartburn., Disp: 90 tablet, Rfl: 1    megestroL (MEGACE) 400 mg/10 mL (10 mL) Susp, Take 5 mLs (200 mg total) by  mouth once daily., Disp: 150 mL, Rfl: 0    multivitamin capsule, Take 1 capsule by mouth once daily., Disp: , Rfl:     nitroGLYCERIN (NITROSTAT) 0.3 MG SL tablet, Place 0.3 mg under the tongue every 5 (five) minutes as needed for Chest pain., Disp: , Rfl:     ondansetron (ZOFRAN-ODT) 4 MG TbDL, Take 1 tablet (4 mg total) by mouth every 6 (six) hours as needed (nausea)., Disp: 40 tablet, Rfl: 2    pantoprazole (PROTONIX) 40 MG tablet, Take 1 tablet (40 mg total) by mouth once daily., Disp: 90 tablet, Rfl: 1    promethazine (PHENERGAN) 25 MG tablet, Take 1 tablet (25 mg total) by mouth every 6 (six) hours as needed for Nausea., Disp: 15 tablet, Rfl: 0    RESTASIS 0.05 % ophthalmic emulsion, Place 1 drop into both eyes 2 (two) times daily., Disp: , Rfl:     UNITHROID 75 mcg tablet, Take 1 tablet (75 mcg total) by mouth before breakfast., Disp: 90 tablet, Rfl: 1    atorvastatin (LIPITOR) 40 MG tablet, Take 1 tablet (40 mg total) by mouth once daily., Disp: 90 tablet, Rfl: 1    FLUoxetine 20 MG capsule, Take 1 capsule (20 mg total) by mouth once daily. (Patient not taking: Reported on 4/20/2023), Disp: 90 capsule, Rfl: 1    metoprolol succinate (TOPROL-XL) 25 MG 24 hr tablet, Take 0.5 tablets (12.5 mg total) by mouth every evening., Disp: 45 tablet, Rfl: 1    Review of Systems   Constitutional: Negative for chills, diaphoresis, fever, malaise/fatigue and night sweats.   HENT:  Negative for congestion and nosebleeds.    Eyes:  Negative for blurred vision and visual disturbance.   Cardiovascular:  Negative for chest pain, claudication, cyanosis, dyspnea on exertion (MILD), irregular heartbeat, leg swelling, near-syncope, orthopnea, palpitations, paroxysmal nocturnal dyspnea and syncope.   Respiratory:  Negative for cough, hemoptysis, shortness of breath and wheezing (LESS).    Endocrine: Negative for cold intolerance, heat intolerance and polyuria.   Hematologic/Lymphatic: Negative for adenopathy. Does not bruise/bleed  "easily.   Skin:  Negative for color change and itching.   Musculoskeletal:  Positive for falls (COMING BACK FROM [place], ON PT). Negative for back pain.   Gastrointestinal:  Negative for abdominal pain, change in bowel habit, dysphagia, jaundice, melena and nausea.   Genitourinary:  Negative for dysuria and flank pain.   Neurological:  Positive for dizziness (MILD ORTHOSTASIS). Negative for brief paralysis, focal weakness, light-headedness, loss of balance, numbness and paresthesias.   Psychiatric/Behavioral:  Positive for memory loss. Negative for depression.    Allergic/Immunologic: Negative for persistent infections.      Objective:      Vitals:    04/20/23 1327   BP: 139/65   Pulse: 75   Weight: 55.3 kg (122 lb)   Height: 5' 2" (1.575 m)   PainSc: 0-No pain     Body mass index is 22.31 kg/m².    Physical Exam  Constitutional:       Appearance: Normal appearance.   Eyes:      General: No scleral icterus.     Extraocular Movements: Extraocular movements intact.      Pupils: Pupils are equal, round, and reactive to light.   Neck:      Vascular: Normal carotid pulses. No JVD.   Cardiovascular:      Rate and Rhythm: Normal rate and regular rhythm. No extrasystoles are present.     Pulses:           Carotid pulses are 2+ on the right side and 2+ on the left side.       Radial pulses are 2+ on the right side and 2+ on the left side.        Posterior tibial pulses are 2+ on the right side and 2+ on the left side.      Heart sounds: Murmur heard.   Diastolic murmur is present with a grade of 2/4 at the upper right sternal border.     No friction rub. No gallop.   Pulmonary:      Effort: Pulmonary effort is normal.      Breath sounds: Normal air entry. Examination of the right-middle field reveals rhonchi. Rhonchi present. No rales.      Comments: OCC RHONCHI  Abdominal:      Palpations: Abdomen is soft.      Tenderness: There is no abdominal tenderness.   Musculoskeletal:      Cervical back: Neck supple.      Right lower " leg: No edema.      Left lower leg: No edema.   Skin:     Capillary Refill: Capillary refill takes less than 2 seconds.   Neurological:      General: No focal deficit present.      Mental Status: She is alert.   Psychiatric:         Mood and Affect: Mood normal.         Speech: Speech normal.         Behavior: Behavior normal.             ..    Chemistry        Component Value Date/Time     03/19/2023 0455    K 3.6 03/19/2023 0455     03/19/2023 0455    CO2 23 03/19/2023 0455    BUN 11 03/19/2023 0455    CREATININE 0.6 03/19/2023 0455     03/19/2023 0455        Component Value Date/Time    CALCIUM 8.5 (L) 03/19/2023 0455    ALKPHOS 79 03/17/2023 0238    AST 21 03/17/2023 0238    ALT 14 03/17/2023 0238    BILITOT 1.1 (H) 03/17/2023 0238    ESTGFRAFRICA 58.1 (A) 03/02/2022 1511    EGFRNONAA 50.4 (A) 03/02/2022 1511            ..  Lab Results   Component Value Date    CHOL 145 02/17/2022    CHOL 151 08/09/2021    CHOL 131 01/12/2021     Lab Results   Component Value Date    HDL 48 02/17/2022    HDL 44 08/09/2021    HDL 43 (L) 01/12/2021     Lab Results   Component Value Date    LDLCALC 72.6 02/17/2022    LDLCALC 78.2 08/09/2021    LDLCALC 66 01/12/2021     Lab Results   Component Value Date    TRIG 122 02/17/2022    TRIG 144 08/09/2021    TRIG 141 01/12/2021     Lab Results   Component Value Date    CHOLHDL 33.1 02/17/2022    CHOLHDL 29.1 08/09/2021    CHOLHDL 3.0 01/12/2021     ..  Lab Results   Component Value Date    WBC 8.41 03/19/2023    HGB 11.5 (L) 03/19/2023    HCT 34.7 (L) 03/19/2023    MCV 90 03/19/2023     03/19/2023       Test(s) Reviewed  I have reviewed the following in detail:  [] Stress test   [] Angiography   [] Echocardiogram   [x] Labs   [] Other:       Assessment:         ICD-10-CM ICD-9-CM   1. Atherosclerotic heart disease of native coronary artery with other forms of angina pectoris  I25.118 414.01     413.9   2. Aortic atherosclerosis  I70.0 440.0   3. Nonrheumatic  aortic valve insufficiency  I35.1 424.1   4. Carotid artery plaque, bilateral  I65.23 433.10     433.30   5. Essential hypertension  I10 401.9   6. Pure hypercholesterolemia  E78.00 272.0   7. Labile hypertension  R09.89 401.9   8. Hyperlipidemia, unspecified hyperlipidemia type  E78.5 272.4     Problem List Items Addressed This Visit          Cardiac/Vascular    Atherosclerotic heart disease of native coronary artery with other forms of angina pectoris - Primary    Essential hypertension    Hyperlipidemia, unspecified    Relevant Medications    atorvastatin (LIPITOR) 40 MG tablet    Nonrheumatic aortic valve insufficiency    Carotid artery plaque, bilateral    Aortic atherosclerosis    Labile hypertension        Plan:     CHANGE LOPRESSOR TO TOPROL 1/2 AT NIGHT, SUPPORT STOCKINGS DURING DAY, LABILE BLOOD PRESSURE, AVOID AND WATCH FOR ORTHOSTASIS, ACCEPT SLIGHTLY HIGHER BLOOD PRESSURE,ALL CV CLINICALLY STABLE, NO ANGINA, NO HF, NO TIA, NO CLINICAL ARRHYTHMIA,CONTINUE CURRENT MEDS, EDUCATION, DIET, EXERCISE AS MUCH AS POSSIBLE, DISCUSSED PLAN WITH THE PATIENT AND HER DAUGHTER, RETURN TO CLINIC IN 6 MO      Atherosclerotic heart disease of native coronary artery with other forms of angina pectoris    Aortic atherosclerosis  Comments:  NO EMBOLIZATION    Nonrheumatic aortic valve insufficiency    Carotid artery plaque, bilateral    Essential hypertension    Pure hypercholesterolemia    Labile hypertension    Hyperlipidemia, unspecified hyperlipidemia type  -     atorvastatin (LIPITOR) 40 MG tablet; Take 1 tablet (40 mg total) by mouth once daily.  Dispense: 90 tablet; Refill: 1    Other orders  -     metoprolol succinate (TOPROL-XL) 25 MG 24 hr tablet; Take 0.5 tablets (12.5 mg total) by mouth every evening.  Dispense: 45 tablet; Refill: 1    RTC Low level/low impact aerobic exercise 5x's/wk. Heart healthy diet and risk factor modification.    See labs and med orders.    Aerobic exercise 5x's/wk. Heart healthy diet and  risk factor modification.    See labs and med orders.

## 2023-04-26 ENCOUNTER — PATIENT MESSAGE (OUTPATIENT)
Dept: FAMILY MEDICINE | Facility: CLINIC | Age: 88
End: 2023-04-26

## 2023-04-26 RX ORDER — MEGESTROL ACETATE 40 MG/ML
200 SUSPENSION ORAL DAILY
Qty: 450 ML | Refills: 1 | Status: SHIPPED | OUTPATIENT
Start: 2023-04-26 | End: 2023-06-05

## 2023-04-27 ENCOUNTER — EXTERNAL HOME HEALTH (OUTPATIENT)
Dept: HOME HEALTH SERVICES | Facility: HOSPITAL | Age: 88
End: 2023-04-27
Payer: MEDICARE

## 2023-05-04 ENCOUNTER — PATIENT MESSAGE (OUTPATIENT)
Dept: FAMILY MEDICINE | Facility: CLINIC | Age: 88
End: 2023-05-04

## 2023-05-05 ENCOUNTER — PATIENT MESSAGE (OUTPATIENT)
Dept: FAMILY MEDICINE | Facility: CLINIC | Age: 88
End: 2023-05-05

## 2023-05-12 ENCOUNTER — DOCUMENT SCAN (OUTPATIENT)
Dept: HOME HEALTH SERVICES | Facility: HOSPITAL | Age: 88
End: 2023-05-12
Payer: MEDICARE

## 2023-05-15 ENCOUNTER — DOCUMENT SCAN (OUTPATIENT)
Dept: HOME HEALTH SERVICES | Facility: HOSPITAL | Age: 88
End: 2023-05-15
Payer: MEDICARE

## 2023-05-20 PROCEDURE — G0179 PR HOME HEALTH MD RECERTIFICATION: ICD-10-PCS | Mod: ,,, | Performed by: FAMILY MEDICINE

## 2023-05-20 PROCEDURE — G0179 MD RECERTIFICATION HHA PT: HCPCS | Mod: ,,, | Performed by: FAMILY MEDICINE

## 2023-06-01 ENCOUNTER — EXTERNAL HOME HEALTH (OUTPATIENT)
Dept: HOME HEALTH SERVICES | Facility: HOSPITAL | Age: 88
End: 2023-06-01
Payer: MEDICARE

## 2023-06-04 ENCOUNTER — PATIENT MESSAGE (OUTPATIENT)
Dept: FAMILY MEDICINE | Facility: CLINIC | Age: 88
End: 2023-06-04

## 2023-06-05 ENCOUNTER — PATIENT MESSAGE (OUTPATIENT)
Dept: FAMILY MEDICINE | Facility: CLINIC | Age: 88
End: 2023-06-05

## 2023-06-05 NOTE — TELEPHONE ENCOUNTER
Per Dr. Kraus, can come in for a nurse visit for us to peek at leg then will decide if she needs U/S

## 2023-06-06 ENCOUNTER — CLINICAL SUPPORT (OUTPATIENT)
Dept: FAMILY MEDICINE | Facility: CLINIC | Age: 88
End: 2023-06-06
Payer: MEDICARE

## 2023-06-06 DIAGNOSIS — R60.0 LEG EDEMA, LEFT: Primary | ICD-10-CM

## 2023-06-06 NOTE — PROGRESS NOTES
Patient is here for some left leg swelling and little red spot behind left knee.     Patient has 1+ pitting edema up to the knee on the left side, no edema on the right.  recommend getting an ultrasound of the left lower extremity to rule out DVT

## 2023-06-07 ENCOUNTER — DOCUMENT SCAN (OUTPATIENT)
Dept: HOME HEALTH SERVICES | Facility: HOSPITAL | Age: 88
End: 2023-06-07
Payer: MEDICARE

## 2023-06-07 ENCOUNTER — HOSPITAL ENCOUNTER (OUTPATIENT)
Dept: RADIOLOGY | Facility: HOSPITAL | Age: 88
Discharge: HOME | End: 2023-06-07
Attending: FAMILY MEDICINE
Payer: MEDICARE

## 2023-06-07 DIAGNOSIS — R60.0 LEG EDEMA, LEFT: ICD-10-CM

## 2023-06-07 DIAGNOSIS — I82.402 ACUTE DEEP VEIN THROMBOSIS (DVT) OF LEFT LOWER EXTREMITY, UNSPECIFIED VEIN: Primary | ICD-10-CM

## 2023-06-07 PROCEDURE — 93971 EXTREMITY STUDY: CPT | Mod: TC,LT

## 2023-06-08 ENCOUNTER — PATIENT MESSAGE (OUTPATIENT)
Dept: FAMILY MEDICINE | Facility: CLINIC | Age: 88
End: 2023-06-08

## 2023-06-08 ENCOUNTER — TELEPHONE (OUTPATIENT)
Dept: FAMILY MEDICINE | Facility: CLINIC | Age: 88
End: 2023-06-08

## 2023-06-08 NOTE — TELEPHONE ENCOUNTER
----- Message from Vidal Kraus MD sent at 6/7/2023  9:26 PM CDT -----  Patient's daughter notified of the positive DVT left leg.  Will start Eliquis 10 mg b.i.d. x1 week then 5 mg b.i.d. thereafter x3 months   Statement Selected

## 2023-06-08 NOTE — TELEPHONE ENCOUNTER
Prescriptions set up to be sent. The Xarelto 20mg I placed a date to not fill until the week of 6/26 so there would not be any confusion with her getting two different doses at once.

## 2023-06-08 NOTE — TELEPHONE ENCOUNTER
Spoke with Mere, pt's daughter in regards to recent ultrasound results. Verbalized per Dr. Kraus that pt is positive DVT left leg.  Will start Eliquis 10 mg b.i.d. x1 week then 5 mg b.i.d. thereafter x3 months. Mere acknowledged understanding.

## 2023-06-08 NOTE — PROGRESS NOTES
Patient's daughter notified of the positive DVT left leg.  Will start Eliquis 10 mg b.i.d. x1 week then 5 mg b.i.d. thereafter x3 months

## 2023-06-12 ENCOUNTER — TELEPHONE (OUTPATIENT)
Dept: FAMILY MEDICINE | Facility: CLINIC | Age: 88
End: 2023-06-12

## 2023-06-12 DIAGNOSIS — Z79.899 ENCOUNTER FOR LONG-TERM (CURRENT) USE OF OTHER MEDICATIONS: ICD-10-CM

## 2023-06-12 DIAGNOSIS — E87.6 HYPOKALEMIA: Primary | ICD-10-CM

## 2023-06-12 NOTE — TELEPHONE ENCOUNTER
----- Message from Vidal Kraus MD sent at 6/11/2023  9:11 PM CDT -----  Call patient's daughter.  Patient's potassium is low at 3.3.  Kidney and liver function looks great.  Cholesterol excellent at 121.  CBC shows stable hematocrit 34.3, thyroid function normal.  Recommend adding Micro-K 10 mEq once a day to her medications.  We can call this into her pharmacy.  Recheck BMP in 4 weeks

## 2023-06-13 ENCOUNTER — DOCUMENT SCAN (OUTPATIENT)
Dept: HOME HEALTH SERVICES | Facility: HOSPITAL | Age: 88
End: 2023-06-13
Payer: MEDICARE

## 2023-06-13 RX ORDER — POTASSIUM CHLORIDE 750 MG/1
10 TABLET, EXTENDED RELEASE ORAL DAILY
Qty: 30 TABLET | Refills: 0 | Status: SHIPPED | OUTPATIENT
Start: 2023-06-13 | End: 2023-07-13

## 2023-06-13 NOTE — TELEPHONE ENCOUNTER
"Per Dr. Kraus "no need for ov if leg is improving. Needs BMP in 4 weeks." Spoke with patients daughter and let her know. Will call us on Tuesday to let us know how her leg is doing before we cancel OV.   "

## 2023-06-13 NOTE — TELEPHONE ENCOUNTER
Spoke to daughter, Marlin, advised of results verbatim per Dr Kraus. Verbalized understanding on all, including rx would be sent in for potassium and I will call when repeat lab is due. Remind me created. Allergies and pharmacy verified. Potassium chloride popped up to be on formulary as recommended alternative. I added this. If in correct, please change. Orders pended. Uses [facility] lab.    Said that she had a nurse visit last week and Dr Kraus popped in to look at her leg because of clot. Wants to know if she should still keep appt on 22nd or push that out a little since Dr Kraus just saw her leg.

## 2023-06-19 ENCOUNTER — PATIENT MESSAGE (OUTPATIENT)
Dept: FAMILY MEDICINE | Facility: CLINIC | Age: 88
End: 2023-06-19

## 2023-07-05 ENCOUNTER — TELEPHONE (OUTPATIENT)
Dept: FAMILY MEDICINE | Facility: CLINIC | Age: 88
End: 2023-07-05

## 2023-07-05 NOTE — TELEPHONE ENCOUNTER
----- Message from NasimaRT Gurpreet sent at 6/13/2023  9:35 AM CDT -----  Regarding: Lab due  ----- Message from Vidal Kraus MD sent at 6/11/2023  9:11 PM CDT -----  Call patient's daughter.  Patient's potassium is low at 3.3.  Kidney and liver function looks great.  Cholesterol excellent at 121.  CBC shows stable hematocrit 34.3, thyroid function normal.  Recommend adding Micro-K 10 mEq once a day to her medications.  We can call this into her pharmacy.  Recheck BMP in 4 weeks

## 2023-07-05 NOTE — TELEPHONE ENCOUNTER
Left mess that fasting lab is due to recheck potassium, order at [facility], gave fasting instructions, asked that she try to have drawn in the next week or so since this is short term f/u. Updated remind me.

## 2023-07-06 ENCOUNTER — TELEPHONE (OUTPATIENT)
Dept: FAMILY MEDICINE | Facility: CLINIC | Age: 88
End: 2023-07-06

## 2023-07-06 NOTE — TELEPHONE ENCOUNTER
----- Message from Efren Holden MA sent at 7/6/2023 11:12 AM CDT -----  Regarding: blood work orders  Anai calling from [facility] saying the pt daughter just called stating that  wants labs drawn on the pt. Anai is requesting the lab orders. [phone]

## 2023-07-07 ENCOUNTER — LAB VISIT (OUTPATIENT)
Dept: LAB | Facility: HOSPITAL | Age: 88
End: 2023-07-07
Attending: FAMILY MEDICINE
Payer: MEDICARE

## 2023-07-07 DIAGNOSIS — Z79.899 ENCOUNTER FOR LONG-TERM (CURRENT) USE OF OTHER MEDICATIONS: ICD-10-CM

## 2023-07-07 DIAGNOSIS — E87.6 HYPOPOTASSEMIA: Primary | ICD-10-CM

## 2023-07-07 LAB
ANION GAP SERPL CALC-SCNC: 10 MMOL/L (ref 8–16)
BUN SERPL-MCNC: 11 MG/DL (ref 8–23)
CALCIUM SERPL-MCNC: 9 MG/DL (ref 8.7–10.5)
CHLORIDE SERPL-SCNC: 104 MMOL/L (ref 95–110)
CO2 SERPL-SCNC: 23 MMOL/L (ref 23–29)
CREAT SERPL-MCNC: 0.7 MG/DL (ref 0.5–1.4)
EST. GFR  (NO RACE VARIABLE): >60 ML/MIN/1.73 M^2
GLUCOSE SERPL-MCNC: 83 MG/DL (ref 70–110)
POTASSIUM SERPL-SCNC: 4.1 MMOL/L (ref 3.5–5.1)
SODIUM SERPL-SCNC: 137 MMOL/L (ref 136–145)

## 2023-07-07 PROCEDURE — 36415 COLL VENOUS BLD VENIPUNCTURE: CPT | Performed by: FAMILY MEDICINE

## 2023-07-07 PROCEDURE — 80048 BASIC METABOLIC PNL TOTAL CA: CPT | Performed by: FAMILY MEDICINE

## 2023-07-10 ENCOUNTER — TELEPHONE (OUTPATIENT)
Dept: FAMILY MEDICINE | Facility: CLINIC | Age: 88
End: 2023-07-10

## 2023-07-10 DIAGNOSIS — F01.50 VASCULAR DEMENTIA WITHOUT BEHAVIORAL DISTURBANCE: Primary | ICD-10-CM

## 2023-07-10 NOTE — TELEPHONE ENCOUNTER
----- Message from Nidhi Meeks sent at 7/10/2023  2:21 PM CDT -----  AUR[place]A : Krystin with [facility] called and stated that she just received a new referral for the patient and she did not get any information. I advised her that per Ilda the doctor just signed the order and she will be sending all the information in just a moment. No patient call back needed for this message.

## 2023-07-10 NOTE — TELEPHONE ENCOUNTER
----- Message from Nidhi Meeks sent at 7/10/2023  3:38 PM CDT -----  Krystin with [facility] called and she would like for you to give her a call back on the patient . Please call [phone] (she did not leave any details on what the call was about . I asked and she stated just have her call me back)

## 2023-07-10 NOTE — TELEPHONE ENCOUNTER
Spoke with Krystin at [facility], states she tried calling the number on the face sheet with no answer. She just wanted to verify the patient lived at home. I let her know she lives with the daughter and gave her the alternative number in the chart to reach her at.

## 2023-07-11 ENCOUNTER — TELEPHONE (OUTPATIENT)
Dept: FAMILY MEDICINE | Facility: CLINIC | Age: 88
End: 2023-07-11

## 2023-07-24 ENCOUNTER — TELEPHONE (OUTPATIENT)
Dept: FAMILY MEDICINE | Facility: CLINIC | Age: 88
End: 2023-07-24

## 2023-07-24 NOTE — TELEPHONE ENCOUNTER
"----- Message from Nidhi Meeks sent at 7/24/2023  2:57 PM CDT -----  Dinorah with [facility] called and stated that she a consult to hospice . If any questions please give her a call at [phone] I asked her for a fax number and she stated ' they  have it already " and she would not give it to me.     "

## 2023-07-24 NOTE — TELEPHONE ENCOUNTER
----- Message from Shilpi Philippe sent at 7/24/2023  9:18 AM CDT -----  Tamika with  [facility] is calling and they are going today to sign her up and they need office notes. All they got was demographics with the contact info only. Please send to them   [phone] fax

## 2023-09-06 DIAGNOSIS — I10 ESSENTIAL HYPERTENSION: Primary | ICD-10-CM

## 2023-09-06 RX ORDER — METOPROLOL SUCCINATE 25 MG/1
TABLET, EXTENDED RELEASE ORAL
Qty: 45 TABLET | Refills: 0 | Status: SHIPPED | OUTPATIENT
Start: 2023-09-06

## 2024-01-11 DIAGNOSIS — Z00.00 ENCOUNTER FOR MEDICARE ANNUAL WELLNESS EXAM: ICD-10-CM
